# Patient Record
Sex: FEMALE | Race: WHITE | NOT HISPANIC OR LATINO | Employment: FULL TIME | URBAN - METROPOLITAN AREA
[De-identification: names, ages, dates, MRNs, and addresses within clinical notes are randomized per-mention and may not be internally consistent; named-entity substitution may affect disease eponyms.]

---

## 2017-05-04 ENCOUNTER — CONVERSION ENCOUNTER (OUTPATIENT)
Dept: ULTRASOUND IMAGING | Facility: HOSPITAL | Age: 52
End: 2017-05-04

## 2018-05-01 ENCOUNTER — APPOINTMENT (OUTPATIENT)
Dept: PREADMISSION TESTING | Facility: HOSPITAL | Age: 53
End: 2018-05-01

## 2018-05-01 VITALS
OXYGEN SATURATION: 98 % | DIASTOLIC BLOOD PRESSURE: 92 MMHG | RESPIRATION RATE: 20 BRPM | HEART RATE: 80 BPM | HEIGHT: 66 IN | SYSTOLIC BLOOD PRESSURE: 147 MMHG | WEIGHT: 150 LBS | BODY MASS INDEX: 24.11 KG/M2 | TEMPERATURE: 98.2 F

## 2018-05-01 LAB
ANION GAP SERPL CALCULATED.3IONS-SCNC: 14.9 MMOL/L
BASOPHILS # BLD AUTO: 0.04 10*3/MM3 (ref 0–0.2)
BASOPHILS NFR BLD AUTO: 0.5 % (ref 0–1.5)
BUN BLD-MCNC: 12 MG/DL (ref 6–20)
BUN/CREAT SERPL: 13.8 (ref 7–25)
CALCIUM SPEC-SCNC: 9.7 MG/DL (ref 8.6–10.5)
CHLORIDE SERPL-SCNC: 101 MMOL/L (ref 98–107)
CO2 SERPL-SCNC: 24.1 MMOL/L (ref 22–29)
CREAT BLD-MCNC: 0.87 MG/DL (ref 0.57–1)
DEPRECATED RDW RBC AUTO: 47.6 FL (ref 37–54)
EOSINOPHIL # BLD AUTO: 0.06 10*3/MM3 (ref 0–0.7)
EOSINOPHIL NFR BLD AUTO: 0.7 % (ref 0.3–6.2)
ERYTHROCYTE [DISTWIDTH] IN BLOOD BY AUTOMATED COUNT: 13.2 % (ref 11.7–13)
GFR SERPL CREATININE-BSD FRML MDRD: 69 ML/MIN/1.73
GLUCOSE BLD-MCNC: 90 MG/DL (ref 65–99)
HCG SERPL QL: NEGATIVE
HCT VFR BLD AUTO: 44 % (ref 35.6–45.5)
HGB BLD-MCNC: 14.1 G/DL (ref 11.9–15.5)
IMM GRANULOCYTES # BLD: 0 10*3/MM3 (ref 0–0.03)
IMM GRANULOCYTES NFR BLD: 0 % (ref 0–0.5)
LYMPHOCYTES # BLD AUTO: 3.78 10*3/MM3 (ref 0.9–4.8)
LYMPHOCYTES NFR BLD AUTO: 42.7 % (ref 19.6–45.3)
MCH RBC QN AUTO: 31.3 PG (ref 26.9–32)
MCHC RBC AUTO-ENTMCNC: 32 G/DL (ref 32.4–36.3)
MCV RBC AUTO: 97.8 FL (ref 80.5–98.2)
MONOCYTES # BLD AUTO: 0.56 10*3/MM3 (ref 0.2–1.2)
MONOCYTES NFR BLD AUTO: 6.3 % (ref 5–12)
NEUTROPHILS # BLD AUTO: 4.41 10*3/MM3 (ref 1.9–8.1)
NEUTROPHILS NFR BLD AUTO: 49.8 % (ref 42.7–76)
PLATELET # BLD AUTO: 296 10*3/MM3 (ref 140–500)
PMV BLD AUTO: 10.4 FL (ref 6–12)
POTASSIUM BLD-SCNC: 4.5 MMOL/L (ref 3.5–5.2)
RBC # BLD AUTO: 4.5 10*6/MM3 (ref 3.9–5.2)
SODIUM BLD-SCNC: 140 MMOL/L (ref 136–145)
WBC NRBC COR # BLD: 8.85 10*3/MM3 (ref 4.5–10.7)

## 2018-05-01 PROCEDURE — 36415 COLL VENOUS BLD VENIPUNCTURE: CPT

## 2018-05-01 PROCEDURE — 80048 BASIC METABOLIC PNL TOTAL CA: CPT | Performed by: OBSTETRICS & GYNECOLOGY

## 2018-05-01 PROCEDURE — 84703 CHORIONIC GONADOTROPIN ASSAY: CPT | Performed by: OBSTETRICS & GYNECOLOGY

## 2018-05-01 PROCEDURE — 85025 COMPLETE CBC W/AUTO DIFF WBC: CPT | Performed by: OBSTETRICS & GYNECOLOGY

## 2018-05-01 RX ORDER — PROMETHAZINE HYDROCHLORIDE 12.5 MG/1
12.5 TABLET ORAL EVERY 6 HOURS PRN
Status: ON HOLD | COMMUNITY
Start: 2016-04-19 | End: 2018-05-08

## 2018-05-01 NOTE — DISCHARGE INSTRUCTIONS
Take the following medications the morning of surgery with a small sip of water:    None    General Instructions:  • Do not eat solid food after midnight the night before surgery.  • You may drink clear liquids day of surgery but must stop at least one hour before your hospital arrival time.  • It is beneficial for you to have a clear drink that contains carbohydrates the day of surgery.  We suggest a 12 to 20 ounce bottle of Gatorade or Powerade for non-diabetic patients or a 12 to 20 ounce bottle of G2 or Powerade Zero for diabetic patients. (Pediatric patients, are not advised to drink a 12 to 20 ounce carbohydrate drink)    Clear liquids are liquids you can see through.  Nothing red in color.     Plain water                               Sports drinks  Sodas                                   Gelatin (Jell-O)  Fruit juices without pulp such as white grape juice and apple juice  Popsicles that contain no fruit or yogurt  Tea or coffee (no cream or milk added)  Gatorade / Powerade  G2 / Powerade Zero    • Infants may have breast milk up to four hours before surgery.  • Infants drinking formula may drink formula up to six hours before surgery.   • Patients who avoid smoking, chewing tobacco and alcohol for 4 weeks prior to surgery have a reduced risk of post-operative complications.  Quit smoking as many days before surgery as you can.  • Do not smoke, use chewing tobacco or drink alcohol the day of surgery.   • If applicable bring your C-PAP/ BI-PAP machine.  • Bring any papers given to you in the doctor’s office.  • Wear clean comfortable clothes and socks.  • Do not wear contact lenses or make-up.  Bring a case for your glasses.   • Bring crutches or walker if applicable.  • Remove all piercings.  Leave jewelry and any other valuables at home.  • Hair extensions with metal clips must be removed prior to surgery.  • The Pre-Admission Testing nurse will instruct you to bring medications if unable to obtain an  accurate list in Pre-Admission Testing.        If you were given a blood bank ID arm band remember to bring it with you the day of surgery.    Preventing a Surgical Site Infection:  • For 2 to 3 days before surgery, avoid shaving with a razor because the razor can irritate skin and make it easier to develop an infection.  • The night prior to surgery sleep in a clean bed with clean clothing.  Do not allow pets to sleep with you.  • Shower on the morning of surgery using a fresh bar of anti-bacterial soap (such as Dial) and clean washcloth.  Dry with a clean towel and dress in clean clothing.  • Ask your surgeon if you will be receiving antibiotics prior to surgery.  • Make sure you, your family, and all healthcare providers clean their hands with soap and water or an alcohol based hand  before caring for you or your wound.    Day of surgery:5/7/18  0730  Upon arrival, a Pre-op nurse and Anesthesiologist will review your health history, obtain vital signs, and answer questions you may have.  The only belongings needed at this time will be your home medications and if applicable your C-PAP/BI-PAP machine.  If you are staying overnight your family can leave the rest of your belongings in the car and bring them to your room later.  A Pre-op nurse will start an IV and you may receive medication in preparation for surgery, including something to help you relax.  Your family will be able to see you in the Pre-op area.  While you are in surgery your family should notify the waiting room  if they leave the waiting room area and provide a contact phone number.    Please be aware that surgery does come with discomfort.  We want to make every effort to control your discomfort so please discuss any uncontrolled symptoms with your nurse.   Your doctor will most likely have prescribed pain medications.      If you are going home after surgery you will receive individualized written care instructions before  being discharged.  A responsible adult must drive you to and from the hospital on the day of your surgery and stay with you for 24 hours.    If you are staying overnight following surgery, you will be transported to your hospital room following the recovery period.  [facility] has all private rooms.    If you have any questions please call Pre-Admission Testing at [phone].  Deductibles and co-payments are collected on the day of service. Please be prepared to pay the required co-pay, deductible or deposit on the day of service as defined by your plan.

## 2018-05-07 ENCOUNTER — ANESTHESIA (OUTPATIENT)
Dept: PERIOP | Facility: HOSPITAL | Age: 53
End: 2018-05-07

## 2018-05-07 ENCOUNTER — HOSPITAL ENCOUNTER (OUTPATIENT)
Facility: HOSPITAL | Age: 53
Discharge: HOME | End: 2018-05-08
Attending: OBSTETRICS & GYNECOLOGY | Admitting: OBSTETRICS & GYNECOLOGY

## 2018-05-07 ENCOUNTER — ANESTHESIA EVENT (OUTPATIENT)
Dept: PERIOP | Facility: HOSPITAL | Age: 53
End: 2018-05-07

## 2018-05-07 DIAGNOSIS — N83.201 RIGHT OVARIAN CYST: ICD-10-CM

## 2018-05-07 PROCEDURE — 25010000002 FENTANYL CITRATE (PF) 100 MCG/2ML SOLUTION: Performed by: NURSE ANESTHETIST, CERTIFIED REGISTERED

## 2018-05-07 PROCEDURE — 25010000002 ONDANSETRON PER 1 MG: Performed by: NURSE ANESTHETIST, CERTIFIED REGISTERED

## 2018-05-07 PROCEDURE — C1765 ADHESION BARRIER: HCPCS | Performed by: OBSTETRICS & GYNECOLOGY

## 2018-05-07 PROCEDURE — 25010000002 MIDAZOLAM PER 1 MG: Performed by: ANESTHESIOLOGY

## 2018-05-07 PROCEDURE — 25010000002 PROPOFOL 10 MG/ML EMULSION: Performed by: NURSE ANESTHETIST, CERTIFIED REGISTERED

## 2018-05-07 PROCEDURE — G0378 HOSPITAL OBSERVATION PER HR: HCPCS

## 2018-05-07 PROCEDURE — 25010000002 ONDANSETRON PER 1 MG: Performed by: OBSTETRICS & GYNECOLOGY

## 2018-05-07 PROCEDURE — 99243 OFF/OP CNSLTJ NEW/EST LOW 30: CPT | Performed by: SURGERY

## 2018-05-07 PROCEDURE — 25010000002 DEXAMETHASONE PER 1 MG: Performed by: NURSE ANESTHETIST, CERTIFIED REGISTERED

## 2018-05-07 PROCEDURE — 25010000002 ONDANSETRON PER 1 MG: Performed by: ANESTHESIOLOGY

## 2018-05-07 PROCEDURE — 25010000002 PROMETHAZINE PER 50 MG: Performed by: OBSTETRICS & GYNECOLOGY

## 2018-05-07 PROCEDURE — 25010000002 ROPIVACAINE PER 1 MG: Performed by: OBSTETRICS & GYNECOLOGY

## 2018-05-07 PROCEDURE — 88305 TISSUE EXAM BY PATHOLOGIST: CPT | Performed by: OBSTETRICS & GYNECOLOGY

## 2018-05-07 PROCEDURE — 88112 CYTOPATH CELL ENHANCE TECH: CPT | Performed by: OBSTETRICS & GYNECOLOGY

## 2018-05-07 RX ORDER — LIDOCAINE HYDROCHLORIDE 40 MG/ML
SOLUTION TOPICAL AS NEEDED
Status: DISCONTINUED | OUTPATIENT
Start: 2018-05-07 | End: 2018-05-07 | Stop reason: SURG

## 2018-05-07 RX ORDER — ONDANSETRON 2 MG/ML
4 INJECTION INTRAMUSCULAR; INTRAVENOUS EVERY 6 HOURS PRN
Status: DISCONTINUED | OUTPATIENT
Start: 2018-05-07 | End: 2018-05-08 | Stop reason: HOSPADM

## 2018-05-07 RX ORDER — LABETALOL HYDROCHLORIDE 5 MG/ML
5 INJECTION, SOLUTION INTRAVENOUS
Status: DISCONTINUED | OUTPATIENT
Start: 2018-05-07 | End: 2018-05-07 | Stop reason: HOSPADM

## 2018-05-07 RX ORDER — PROMETHAZINE HYDROCHLORIDE 25 MG/ML
12.5 INJECTION, SOLUTION INTRAMUSCULAR; INTRAVENOUS EVERY 6 HOURS PRN
Status: CANCELLED | OUTPATIENT
Start: 2018-05-07

## 2018-05-07 RX ORDER — DEXTROSE AND SODIUM CHLORIDE 5; .45 G/100ML; G/100ML
100 INJECTION, SOLUTION INTRAVENOUS CONTINUOUS
Status: DISCONTINUED | OUTPATIENT
Start: 2018-05-07 | End: 2018-05-08

## 2018-05-07 RX ORDER — EPHEDRINE SULFATE 50 MG/ML
5 INJECTION, SOLUTION INTRAVENOUS ONCE AS NEEDED
Status: DISCONTINUED | OUTPATIENT
Start: 2018-05-07 | End: 2018-05-07 | Stop reason: HOSPADM

## 2018-05-07 RX ORDER — LIDOCAINE HYDROCHLORIDE 20 MG/ML
INJECTION, SOLUTION INFILTRATION; PERINEURAL AS NEEDED
Status: DISCONTINUED | OUTPATIENT
Start: 2018-05-07 | End: 2018-05-07 | Stop reason: SURG

## 2018-05-07 RX ORDER — DOCUSATE SODIUM 100 MG/1
100 CAPSULE, LIQUID FILLED ORAL 2 TIMES DAILY
Status: DISCONTINUED | OUTPATIENT
Start: 2018-05-07 | End: 2018-05-08 | Stop reason: HOSPADM

## 2018-05-07 RX ORDER — GLYCOPYRROLATE 0.2 MG/ML
INJECTION INTRAMUSCULAR; INTRAVENOUS AS NEEDED
Status: DISCONTINUED | OUTPATIENT
Start: 2018-05-07 | End: 2018-05-07 | Stop reason: SURG

## 2018-05-07 RX ORDER — DEXAMETHASONE SODIUM PHOSPHATE 10 MG/ML
INJECTION INTRAMUSCULAR; INTRAVENOUS AS NEEDED
Status: DISCONTINUED | OUTPATIENT
Start: 2018-05-07 | End: 2018-05-07 | Stop reason: SURG

## 2018-05-07 RX ORDER — MIDAZOLAM HYDROCHLORIDE 1 MG/ML
1 INJECTION INTRAMUSCULAR; INTRAVENOUS
Status: DISCONTINUED | OUTPATIENT
Start: 2018-05-07 | End: 2018-05-07 | Stop reason: HOSPADM

## 2018-05-07 RX ORDER — MIDAZOLAM HYDROCHLORIDE 1 MG/ML
2 INJECTION INTRAMUSCULAR; INTRAVENOUS
Status: DISCONTINUED | OUTPATIENT
Start: 2018-05-07 | End: 2018-05-07 | Stop reason: HOSPADM

## 2018-05-07 RX ORDER — FAMOTIDINE 10 MG/ML
20 INJECTION, SOLUTION INTRAVENOUS ONCE
Status: COMPLETED | OUTPATIENT
Start: 2018-05-07 | End: 2018-05-07

## 2018-05-07 RX ORDER — MAGNESIUM HYDROXIDE 1200 MG/15ML
LIQUID ORAL AS NEEDED
Status: DISCONTINUED | OUTPATIENT
Start: 2018-05-07 | End: 2018-05-07 | Stop reason: HOSPADM

## 2018-05-07 RX ORDER — SODIUM CHLORIDE 0.9 % (FLUSH) 0.9 %
1-10 SYRINGE (ML) INJECTION AS NEEDED
Status: DISCONTINUED | OUTPATIENT
Start: 2018-05-07 | End: 2018-05-07 | Stop reason: HOSPADM

## 2018-05-07 RX ORDER — PROMETHAZINE HYDROCHLORIDE 25 MG/1
12.5 TABLET ORAL ONCE AS NEEDED
Status: DISCONTINUED | OUTPATIENT
Start: 2018-05-07 | End: 2018-05-07 | Stop reason: HOSPADM

## 2018-05-07 RX ORDER — SODIUM CHLORIDE, SODIUM LACTATE, POTASSIUM CHLORIDE, CALCIUM CHLORIDE 600; 310; 30; 20 MG/100ML; MG/100ML; MG/100ML; MG/100ML
9 INJECTION, SOLUTION INTRAVENOUS CONTINUOUS
Status: DISCONTINUED | OUTPATIENT
Start: 2018-05-07 | End: 2018-05-08

## 2018-05-07 RX ORDER — HYDROMORPHONE HYDROCHLORIDE 2 MG/1
2 TABLET ORAL
Status: DISCONTINUED | OUTPATIENT
Start: 2018-05-07 | End: 2018-05-08

## 2018-05-07 RX ORDER — HYDROMORPHONE HYDROCHLORIDE 2 MG/1
2 TABLET ORAL ONCE
Status: DISCONTINUED | OUTPATIENT
Start: 2018-05-07 | End: 2018-05-07 | Stop reason: HOSPADM

## 2018-05-07 RX ORDER — FLUMAZENIL 0.1 MG/ML
0.2 INJECTION INTRAVENOUS AS NEEDED
Status: DISCONTINUED | OUTPATIENT
Start: 2018-05-07 | End: 2018-05-07 | Stop reason: HOSPADM

## 2018-05-07 RX ORDER — FENTANYL CITRATE 50 UG/ML
50 INJECTION, SOLUTION INTRAMUSCULAR; INTRAVENOUS
Status: DISCONTINUED | OUTPATIENT
Start: 2018-05-07 | End: 2018-05-07 | Stop reason: HOSPADM

## 2018-05-07 RX ORDER — PROPOFOL 10 MG/ML
VIAL (ML) INTRAVENOUS AS NEEDED
Status: DISCONTINUED | OUTPATIENT
Start: 2018-05-07 | End: 2018-05-07 | Stop reason: SURG

## 2018-05-07 RX ORDER — HYDRALAZINE HYDROCHLORIDE 20 MG/ML
5 INJECTION INTRAMUSCULAR; INTRAVENOUS
Status: DISCONTINUED | OUTPATIENT
Start: 2018-05-07 | End: 2018-05-07 | Stop reason: HOSPADM

## 2018-05-07 RX ORDER — SODIUM CHLORIDE 9 MG/ML
INJECTION, SOLUTION INTRAVENOUS AS NEEDED
Status: DISCONTINUED | OUTPATIENT
Start: 2018-05-07 | End: 2018-05-07 | Stop reason: HOSPADM

## 2018-05-07 RX ORDER — ROPIVACAINE HYDROCHLORIDE 5 MG/ML
INJECTION, SOLUTION EPIDURAL; INFILTRATION; PERINEURAL AS NEEDED
Status: DISCONTINUED | OUTPATIENT
Start: 2018-05-07 | End: 2018-05-07 | Stop reason: HOSPADM

## 2018-05-07 RX ORDER — PROMETHAZINE HYDROCHLORIDE 25 MG/ML
12.5 INJECTION, SOLUTION INTRAMUSCULAR; INTRAVENOUS EVERY 4 HOURS PRN
Status: DISCONTINUED | OUTPATIENT
Start: 2018-05-07 | End: 2018-05-08 | Stop reason: HOSPADM

## 2018-05-07 RX ORDER — ROCURONIUM BROMIDE 10 MG/ML
INJECTION, SOLUTION INTRAVENOUS AS NEEDED
Status: DISCONTINUED | OUTPATIENT
Start: 2018-05-07 | End: 2018-05-07 | Stop reason: SURG

## 2018-05-07 RX ORDER — NALOXONE HCL 0.4 MG/ML
0.2 VIAL (ML) INJECTION AS NEEDED
Status: DISCONTINUED | OUTPATIENT
Start: 2018-05-07 | End: 2018-05-07 | Stop reason: HOSPADM

## 2018-05-07 RX ORDER — ONDANSETRON 2 MG/ML
4 INJECTION INTRAMUSCULAR; INTRAVENOUS ONCE AS NEEDED
Status: COMPLETED | OUTPATIENT
Start: 2018-05-07 | End: 2018-05-07

## 2018-05-07 RX ORDER — FENTANYL CITRATE 50 UG/ML
INJECTION, SOLUTION INTRAMUSCULAR; INTRAVENOUS AS NEEDED
Status: DISCONTINUED | OUTPATIENT
Start: 2018-05-07 | End: 2018-05-07 | Stop reason: SURG

## 2018-05-07 RX ORDER — SODIUM CHLORIDE 0.9 % (FLUSH) 0.9 %
1-10 SYRINGE (ML) INJECTION AS NEEDED
Status: DISCONTINUED | OUTPATIENT
Start: 2018-05-07 | End: 2018-05-08 | Stop reason: HOSPADM

## 2018-05-07 RX ORDER — DIPHENHYDRAMINE HYDROCHLORIDE 50 MG/ML
12.5 INJECTION INTRAMUSCULAR; INTRAVENOUS
Status: DISCONTINUED | OUTPATIENT
Start: 2018-05-07 | End: 2018-05-07 | Stop reason: HOSPADM

## 2018-05-07 RX ADMIN — ROCURONIUM BROMIDE 10 MG: 10 INJECTION INTRAVENOUS at 13:14

## 2018-05-07 RX ADMIN — FENTANYL CITRATE 25 MCG: 50 INJECTION INTRAMUSCULAR; INTRAVENOUS at 14:05

## 2018-05-07 RX ADMIN — MIDAZOLAM HYDROCHLORIDE 2 MG: 2 INJECTION, SOLUTION INTRAMUSCULAR; INTRAVENOUS at 10:42

## 2018-05-07 RX ADMIN — FENTANYL CITRATE 50 MCG: 50 INJECTION INTRAMUSCULAR; INTRAVENOUS at 14:48

## 2018-05-07 RX ADMIN — FENTANYL CITRATE 50 MCG: 50 INJECTION INTRAMUSCULAR; INTRAVENOUS at 14:58

## 2018-05-07 RX ADMIN — LIDOCAINE HYDROCHLORIDE 1 EACH: 40 SOLUTION TOPICAL at 12:22

## 2018-05-07 RX ADMIN — ONDANSETRON 4 MG: 2 INJECTION, SOLUTION INTRAMUSCULAR; INTRAVENOUS at 15:14

## 2018-05-07 RX ADMIN — PROMETHAZINE HYDROCHLORIDE 12.5 MG: 25 INJECTION INTRAMUSCULAR; INTRAVENOUS at 22:19

## 2018-05-07 RX ADMIN — ONDANSETRON 4 MG: 2 INJECTION INTRAMUSCULAR; INTRAVENOUS at 19:02

## 2018-05-07 RX ADMIN — ROCURONIUM BROMIDE 10 MG: 10 INJECTION INTRAVENOUS at 13:32

## 2018-05-07 RX ADMIN — FENTANYL CITRATE 25 MCG: 50 INJECTION INTRAMUSCULAR; INTRAVENOUS at 13:57

## 2018-05-07 RX ADMIN — FENTANYL CITRATE 100 MCG: 50 INJECTION INTRAMUSCULAR; INTRAVENOUS at 12:19

## 2018-05-07 RX ADMIN — ONDANSETRON 4 MG: 2 INJECTION INTRAMUSCULAR; INTRAVENOUS at 10:42

## 2018-05-07 RX ADMIN — DEXTROSE AND SODIUM CHLORIDE 100 ML/HR: 5; 450 INJECTION, SOLUTION INTRAVENOUS at 19:02

## 2018-05-07 RX ADMIN — ROCURONIUM BROMIDE 40 MG: 10 INJECTION INTRAVENOUS at 12:20

## 2018-05-07 RX ADMIN — SUGAMMADEX 300 MG: 100 INJECTION, SOLUTION INTRAVENOUS at 14:01

## 2018-05-07 RX ADMIN — FAMOTIDINE 20 MG: 10 INJECTION INTRAVENOUS at 10:42

## 2018-05-07 RX ADMIN — GLYCOPYRROLATE 0.2 MG: 0.2 INJECTION INTRAMUSCULAR; INTRAVENOUS at 12:28

## 2018-05-07 RX ADMIN — HYDROMORPHONE HYDROCHLORIDE 2 MG: 2 TABLET ORAL at 18:10

## 2018-05-07 RX ADMIN — DEXAMETHASONE SODIUM PHOSPHATE 8 MG: 10 INJECTION INTRAMUSCULAR; INTRAVENOUS at 12:26

## 2018-05-07 RX ADMIN — PROPOFOL 200 MG: 10 INJECTION, EMULSION INTRAVENOUS at 12:20

## 2018-05-07 RX ADMIN — SODIUM CHLORIDE, POTASSIUM CHLORIDE, SODIUM LACTATE AND CALCIUM CHLORIDE 9 ML/HR: 600; 310; 30; 20 INJECTION, SOLUTION INTRAVENOUS at 10:42

## 2018-05-07 RX ADMIN — SODIUM CHLORIDE, POTASSIUM CHLORIDE, SODIUM LACTATE AND CALCIUM CHLORIDE: 600; 310; 30; 20 INJECTION, SOLUTION INTRAVENOUS at 12:14

## 2018-05-07 RX ADMIN — SODIUM CHLORIDE, POTASSIUM CHLORIDE, SODIUM LACTATE AND CALCIUM CHLORIDE: 600; 310; 30; 20 INJECTION, SOLUTION INTRAVENOUS at 14:02

## 2018-05-07 RX ADMIN — FENTANYL CITRATE 50 MCG: 50 INJECTION INTRAMUSCULAR; INTRAVENOUS at 14:14

## 2018-05-07 RX ADMIN — LIDOCAINE HYDROCHLORIDE 50 MG: 20 INJECTION, SOLUTION INFILTRATION; PERINEURAL at 12:20

## 2018-05-07 NOTE — CONSULTS
Intraoperative consultation    The patient is a 52-year-old female who was undergoing a GYN laparoscopic procedure.  She was noted to have a rent in the mesentery adjacent to the sigmoid colon that was thought to be related to port placement.  Intraoperative consultation was requested by Bisi Donnelly M.D. to evaluate the sigmoid colon due to concern that there may be an injury.    The patient was already in surgery with a laparoscopic procedure underwent an unscrubbed and entered the case and was able to manipulate the rent in a way to get exposure to the sigmoid colon wall.  The wall appeared to be intact with no apparent evidence of injury.  The pelvis was filled with irrigation fluid and air was elevated through the rectum to insufflate the sigmoid colon with no air leak noted.  It was felt that the injury was isolated to the mesentery and was not involving the bowel.  The mesenteric rent itself was hemostatic.  No further treatment is necessary was advised that the patient be admitted postoperatively for close observation.    Prashant Hightower Jr., M.D.

## 2018-05-07 NOTE — PLAN OF CARE
Problem: Patient Care Overview  Goal: Plan of Care Review  Outcome: Ongoing (interventions implemented as appropriate)   05/07/18 1613   Coping/Psychosocial   Plan of Care Reviewed With patient   OTHER   Outcome Summary lap sites x 3 dry & [id number] with dermabond, scant vaginal drainage on narcisa-pad,, c/o more pain on right side, DTV, VSS, post-op nausea better after meds     Goal: Individualization and Mutuality  Outcome: Ongoing (interventions implemented as appropriate)    Goal: Discharge Needs Assessment  Outcome: Ongoing (interventions implemented as appropriate)    Goal: Interprofessional Rounds/Family Conf  Outcome: Ongoing (interventions implemented as appropriate)      Problem: Surgery Nonspecified (Adult)  Goal: Anesthesia/Sedation Recovery  Outcome: Outcome(s) achieved Date Met: 05/07/18

## 2018-05-07 NOTE — ANESTHESIA PREPROCEDURE EVALUATION
Anesthesia Evaluation     Patient summary reviewed and Nursing notes reviewed   history of anesthetic complications: PONV  NPO Solid Status: > 6 hours  NPO Liquid Status: > 6 hours           Airway   Mallampati: II  TM distance: >3 FB  Neck ROM: full  no difficulty expected and No difficulty expected  Dental - normal exam   (+) lower dentures and upper dentures    Pulmonary - negative pulmonary ROS and normal exam    breath sounds clear to auscultation  (-) rhonchi, decreased breath sounds, wheezes, rales, stridor  Cardiovascular - negative cardio ROS and normal exam    NYHA Classification: I  Rhythm: regular  Rate: normal    (-) murmur, weak pulses, friction rub, systolic click, carotid bruits, JVD, peripheral edema      Neuro/Psych- negative ROS  GI/Hepatic/Renal/Endo - negative ROS     Musculoskeletal (-) negative ROS    Abdominal  - normal exam    Abdomen: soft.   Substance History - negative use     OB/GYN negative ob/gyn ROS         Other - negative ROS                       Anesthesia Plan    ASA 2     general     intravenous induction   Anesthetic plan and risks discussed with patient.

## 2018-05-07 NOTE — ANESTHESIA POSTPROCEDURE EVALUATION
"Patient: aWleska Shah    Procedure Summary     Date:  05/07/18 Room / Location:  [facility] OR  / [facility] MAIN OR    Anesthesia Start:  1214 Anesthesia Stop:  1415    Procedure:  LAPAROSCOPIC RIGHT OVARIAN CYSTECTOMY LYSIS OF ADHESIONS LEFT ABDOMINAL WALL (Right Abdomen) Diagnosis:      Surgeon:  Bisi Donnelly MD Provider:  Mike Calderon MD    Anesthesia Type:  general ASA Status:  2          Anesthesia Type: general  Last vitals  BP   147/85 (05/07/18 1500)   Temp   36.6 °C (97.9 °F) (05/07/18 1411)   Pulse   84 (05/07/18 1500)   Resp   18 (05/07/18 1500)     SpO2   96 % (05/07/18 1500)     Post Anesthesia Care and Evaluation    Patient location during evaluation: PACU  Patient participation: complete - patient participated  Level of consciousness: awake and alert  Pain score: 0  Pain management: adequate  Airway patency: patent  Anesthetic complications: No anesthetic complications    Cardiovascular status: acceptable  Respiratory status: acceptable  Hydration status: acceptable    Comments: /85   Pulse 84   Temp 36.6 °C (97.9 °F) (Oral)   Resp 18   Ht 167.6 cm (66\")   Wt 68.7 kg (151 lb 8 oz)   LMP 04/08/2018   SpO2 96%   BMI 24.45 kg/m²       "

## 2018-05-07 NOTE — BRIEF OP NOTE
DIAGNOSTIC LAPAROSCOPY  Progress Note    Waleska Shah  5/7/2018    Pre-Op Diagnosis:  Three cysts in the right ovary       Post-Op Diagnosis Codes:   Three small blood filled cysts in the right ovary    Procedure/CPT® Codes:      Procedure(s):  LAPAROSCOPIC RIGHT OVARIAN CYSTECTOMY LYSIS OF ADHESIONS LEFT ABDOMINAL WALL with pelvic washings and consultation intra-op with Dr ROSA Hightower    Surgeon(s):  MD Bisi Galarza Jr., MD    Anesthesia: General    Staff:   Circulator: Perla Garcia RN  Scrub Person: Lesa Avila  Orientee: Alex Monsalve RN    Estimated Blood Los about 50 cc or less.    Urine Voided: * No values recorded between 5/7/2018 12:12 PM and 5/7/2018  2:10 PM *    Specimens:                ID Type Source Tests Collected by Time   A : PELVIC WASHINGS Body Fluid Pelvis NON-GYNECOLOGIC CYTOLOGY Bisi Donnelly MD 5/7/2018 1246   B : RIGHT OVARIAN CYST Tissue Ovary, Right TISSUE PATHOLOGY EXAM Bisi Donnelly MD 5/7/2018 1257         Drains:  none    Findings: Normal pelvis, Filmy adhesions between the mesentery and left pelvic sidewall.  Normal uterus.  Three small cysts, blood filled, on the right ovary.  Evidence of rent in the mesentery  adjacent to the sigmoid colon    Complications: Rent in the mesentery adjacent to the sigmoid colon , probably from trochar at entry.      Bisi Donnelly MD     Date: 5/7/2018  Time: 2:32 PM

## 2018-05-07 NOTE — ANESTHESIA PROCEDURE NOTES
Airway  Urgency: elective    Airway not difficult    General Information and Staff    Patient location during procedure: OR  Anesthesiologist: RAISSA MARTINEZ  CRNA: MICHAELLE MICHELLE    Indications and Patient Condition  Indications for airway management: airway protection    Preoxygenated: yes  MILS maintained throughout  Mask difficulty assessment: 2 - vent by mask + OA or adjuvant +/- NMBA    Final Airway Details  Final airway type: endotracheal airway      Successful airway: ETT  Cuffed: yes   Successful intubation technique: direct laryngoscopy  Facilitating devices/methods: intubating stylet  Endotracheal tube insertion site: oral  Blade: Amie  ETT size: 7.0 mm  Cormack-Lehane Classification: grade I - full view of glottis  Placement verified by: chest auscultation and capnometry   Inital cuff pressure (cm H2O): 21  Measured from: gums  Number of attempts at approach: 1    Additional Comments  Atraumatic. Dentition as preop.

## 2018-05-07 NOTE — OP NOTE
PREOPERATIVE DIAGNOSIS: Cyst with right lower quadrant pain 3 cysts on the right ovary    POSTOPERATIVE DIAGNOSIS: 3 small blood-filled cyst right ovary some filmy adhesions between the omentum and the left pelvic sidewall normal uterus    PROCEDURE: Laparoscopy with removal of 3 ovarian cysts and lysis of filmy adhesions.  One appeared consistent with a corpus luteal cyst the other 2 were blood-filled.  Pelvic washings .  Consultation from Dr. Prashant Hightower regarding rent in the mesenteric fat adjacent to the sigmoid.    SURGEON:  Bisi Donnelly MD    SURGICAL ASSISTANT: none    FINDINGS: Cyst on the right ovary 1 appeared to be a corpus luteal cyst the other tube were filled with blood.  Filmy adhesions to the left pelvic sidewall.  There was a rent in the midline mesenteric fat adjacent to the sigmoid that was noticed during the surgery.    COMPLICATIONS: Rent in the mesenteric sigmoid.    ANESTHESIA: Gen.    BLOOD LOSS: Less than 50 cc    DESCRIPTION OF PROCEDURE: The patient to the operating room.  She was given general anesthesia.  She was prepped and draped in a modified lithotomy position using Gian stirrups.  Attention was placed that her leg was in line with the contralateral shoulder.  After she was prepped her bladder was emptied.  She was then draped.  On bimanual the uterus uterus was midline and small.  Next a speculum was placed in vagina single-tooth tenaculum used to grasp the anterior lip of the cervix and a Hulka  manipulator inserted.  Ring that I changed my gloves and made a 5 mm incision at the superior portion of her local incision from her abdominoplasty.  First injected 5 cc of naropin 1%.  I had made a 5 mm incision after injecting the medication.  The abdominal wall was lifted which was somewhat difficult because of her previous abdominoplasty and I inserted a 5 mm trocar with the 5 mm laparoscope inside into the peritoneal cavity.  The trocar was removed along with the laparoscope and  she was insufflated from a pressure 3 to a pressure 15 mmHg.  He was then placed in Trendelenburg and the laparoscope was reinserted.  There were some filmy adhesions between the omentum and the pelvic sidewall.  The right ovary was smooth and had 3 small cysts.  There were absent fallopian tubes.  The uterus was normal.  It was elevated and the cul-de-sac was normal.  There was no evidence of endometriosis.  Pelvic washings were then taken.  I then opened each of the ovarian cyst and from to the cysts I was able to remove pieces of what appeared to be cyst lining.  These were then cauterized using a  Kleppinger to help control bleeding. Next,  the third area appeared to be a corpus luteal cyst. When opened,  blood came out of the 2 other cysts.  This was consistent with 2 hemorrhagic cysts.  Speck in the pelvis I noted that there was a tear in the mesenteric fat adjacent to the colon I asked that Dr. Prashant Hightower come in for consultation.  Please see his dictation.  I had applied FloSeal while waiting for Dr. Hightower.  Prior to that I had packed the cyst that was consistent with a corpus luteal cyst with the Snow fibrin a agent.  There was noted to be good hemostasis.  The ovary was wrapped in Interceed.  I had removed as much fluid as possible.  Her pelvis was filled with fluid to check for damage to the colon when Dr Hightower was in attendance.   Her all the fluid had been drained from the peritoneal cavity the ovary was wrapped in pieces of intercede.  I then allowed the cyst to escape and the peritoneal cavity and deflated the 3 balloons on the 5 mm trochars that I had filled after inserting them.  I removed the 3 trochars 4-0 Vicryl used to close the skin in a subcuticular fashion and Dermabond was applied.  Tenaculum was removed from the cervix.  Her vital signs were stable.  There is no evidence of bleeding when I finished the surgery.  She left the operating room in good condition.  Her estimated blood loss  was less than 50 cc.

## 2018-05-08 VITALS
TEMPERATURE: 97.5 F | WEIGHT: 151.5 LBS | OXYGEN SATURATION: 95 % | HEIGHT: 66 IN | RESPIRATION RATE: 16 BRPM | BODY MASS INDEX: 24.35 KG/M2 | HEART RATE: 77 BPM | DIASTOLIC BLOOD PRESSURE: 73 MMHG | SYSTOLIC BLOOD PRESSURE: 128 MMHG

## 2018-05-08 LAB
ALBUMIN SERPL-MCNC: 3.6 G/DL (ref 3.5–5.2)
ALBUMIN/GLOB SERPL: 1.4 G/DL
ALP SERPL-CCNC: 62 U/L (ref 39–117)
ALT SERPL W P-5'-P-CCNC: 6 U/L (ref 1–33)
ANION GAP SERPL CALCULATED.3IONS-SCNC: 11.2 MMOL/L
AST SERPL-CCNC: 10 U/L (ref 1–32)
BASOPHILS # BLD AUTO: 0 10*3/MM3 (ref 0–0.2)
BASOPHILS NFR BLD AUTO: 0 % (ref 0–1.5)
BILIRUB SERPL-MCNC: 0.4 MG/DL (ref 0.1–1.2)
BUN BLD-MCNC: 8 MG/DL (ref 6–20)
BUN/CREAT SERPL: 12.5 (ref 7–25)
CALCIUM SPEC-SCNC: 8.7 MG/DL (ref 8.6–10.5)
CHLORIDE SERPL-SCNC: 104 MMOL/L (ref 98–107)
CO2 SERPL-SCNC: 24.8 MMOL/L (ref 22–29)
CREAT BLD-MCNC: 0.64 MG/DL (ref 0.57–1)
CYTO UR: NORMAL
CYTO UR: NORMAL
DEPRECATED RDW RBC AUTO: 47.8 FL (ref 37–54)
EOSINOPHIL # BLD AUTO: 0 10*3/MM3 (ref 0–0.7)
EOSINOPHIL NFR BLD AUTO: 0 % (ref 0.3–6.2)
ERYTHROCYTE [DISTWIDTH] IN BLOOD BY AUTOMATED COUNT: 13.4 % (ref 11.7–13)
GFR SERPL CREATININE-BSD FRML MDRD: 98 ML/MIN/1.73
GLOBULIN UR ELPH-MCNC: 2.5 GM/DL
GLUCOSE BLD-MCNC: 114 MG/DL (ref 65–99)
HCT VFR BLD AUTO: 40 % (ref 35.6–45.5)
HGB BLD-MCNC: 12.7 G/DL (ref 11.9–15.5)
IMM GRANULOCYTES # BLD: 0.02 10*3/MM3 (ref 0–0.03)
IMM GRANULOCYTES NFR BLD: 0.2 % (ref 0–0.5)
LAB AP CASE REPORT: NORMAL
LAB AP CASE REPORT: NORMAL
LYMPHOCYTES # BLD AUTO: 1.64 10*3/MM3 (ref 0.9–4.8)
LYMPHOCYTES NFR BLD AUTO: 14.1 % (ref 19.6–45.3)
Lab: NORMAL
Lab: NORMAL
MCH RBC QN AUTO: 30.8 PG (ref 26.9–32)
MCHC RBC AUTO-ENTMCNC: 31.8 G/DL (ref 32.4–36.3)
MCV RBC AUTO: 97.1 FL (ref 80.5–98.2)
MONOCYTES # BLD AUTO: 0.77 10*3/MM3 (ref 0.2–1.2)
MONOCYTES NFR BLD AUTO: 6.6 % (ref 5–12)
NEUTROPHILS # BLD AUTO: 9.22 10*3/MM3 (ref 1.9–8.1)
NEUTROPHILS NFR BLD AUTO: 79.1 % (ref 42.7–76)
PATH REPORT.FINAL DX SPEC: NORMAL
PATH REPORT.FINAL DX SPEC: NORMAL
PATH REPORT.GROSS SPEC: NORMAL
PATH REPORT.GROSS SPEC: NORMAL
PLATELET # BLD AUTO: 268 10*3/MM3 (ref 140–500)
PMV BLD AUTO: 10.4 FL (ref 6–12)
POTASSIUM BLD-SCNC: 4.2 MMOL/L (ref 3.5–5.2)
PROT SERPL-MCNC: 6.1 G/DL (ref 6–8.5)
RBC # BLD AUTO: 4.12 10*6/MM3 (ref 3.9–5.2)
SODIUM BLD-SCNC: 140 MMOL/L (ref 136–145)
WBC NRBC COR # BLD: 11.65 10*3/MM3 (ref 4.5–10.7)

## 2018-05-08 PROCEDURE — 99212 OFFICE O/P EST SF 10 MIN: CPT | Performed by: SURGERY

## 2018-05-08 PROCEDURE — 85025 COMPLETE CBC W/AUTO DIFF WBC: CPT | Performed by: OBSTETRICS & GYNECOLOGY

## 2018-05-08 PROCEDURE — G0378 HOSPITAL OBSERVATION PER HR: HCPCS

## 2018-05-08 PROCEDURE — 80053 COMPREHEN METABOLIC PANEL: CPT | Performed by: OBSTETRICS & GYNECOLOGY

## 2018-05-08 RX ORDER — PROMETHAZINE HYDROCHLORIDE 12.5 MG/1
25 TABLET ORAL EVERY 6 HOURS PRN
Qty: 8 TABLET | Refills: 0 | Status: SHIPPED | OUTPATIENT
Start: 2018-05-08 | End: 2021-09-10

## 2018-05-08 RX ORDER — HYDROCODONE BITARTRATE AND ACETAMINOPHEN 7.5; 325 MG/1; MG/1
1 TABLET ORAL EVERY 6 HOURS PRN
Status: DISCONTINUED | OUTPATIENT
Start: 2018-05-08 | End: 2018-05-08 | Stop reason: HOSPADM

## 2018-05-08 RX ORDER — HYDROCODONE BITARTRATE AND ACETAMINOPHEN 7.5; 325 MG/1; MG/1
1 TABLET ORAL EVERY 6 HOURS PRN
Qty: 20 TABLET | Refills: 0 | Status: SHIPPED | OUTPATIENT
Start: 2018-05-08 | End: 2018-05-18

## 2018-05-08 RX ADMIN — DEXTROSE AND SODIUM CHLORIDE 100 ML/HR: 5; 450 INJECTION, SOLUTION INTRAVENOUS at 04:07

## 2018-05-08 RX ADMIN — HYDROMORPHONE HYDROCHLORIDE 2 MG: 2 TABLET ORAL at 05:23

## 2018-05-08 RX ADMIN — HYDROCODONE BITARTRATE AND ACETAMINOPHEN 1 TABLET: 7.5; 325 TABLET ORAL at 08:38

## 2018-05-08 RX ADMIN — DOCUSATE SODIUM 100 MG: 100 CAPSULE, LIQUID FILLED ORAL at 08:28

## 2018-05-08 NOTE — PLAN OF CARE
Problem: Patient Care Overview  Goal: Plan of Care Review   05/08/18 0356   Coping/Psychosocial   Plan of Care Reviewed With patient   OTHER   Outcome Summary Patient resting well. Vital signs stable. c/o intermittent nausea. Prn phenergan given.. Iv fluids continued. Encouraged to ambulate.     Goal: Individualization and Mutuality  Outcome: Ongoing (interventions implemented as appropriate)    Goal: Discharge Needs Assessment  Outcome: Ongoing (interventions implemented as appropriate)      Problem: Surgery Nonspecified (Adult)  Goal: Signs and Symptoms of Listed Potential Problems Will be Absent, Minimized or Managed (Surgery Nonspecified)  Outcome: Ongoing (interventions implemented as appropriate)

## 2018-05-08 NOTE — PROGRESS NOTES
Addendum to progress note    Slightly elevated wbc of 11,000 but no fever and clinically improved from yesterday.  Will discharge home.  To call for fever over 101 degrees, increasing pain or change in bowel habits.

## 2018-05-08 NOTE — PLAN OF CARE
Problem: Patient Care Overview  Goal: Plan of Care Review  Outcome: Outcome(s) achieved Date Met: 05/08/18 05/08/18 1026   Coping/Psychosocial   Plan of Care Reviewed With patient   Plan of Care Review   Progress improving     Goal: Individualization and Mutuality  Outcome: Outcome(s) achieved Date Met: 05/08/18    Goal: Interprofessional Rounds/Family Conf  Outcome: Outcome(s) achieved Date Met: 05/08/18      Problem: Surgery Nonspecified (Adult)  Goal: Signs and Symptoms of Listed Potential Problems Will be Absent, Minimized or Managed (Surgery Nonspecified)  Outcome: Outcome(s) achieved Date Met: 05/08/18

## 2018-05-08 NOTE — PROGRESS NOTES
S:  Nausea better. Hungry.  Minimal pain.  Still with distension    O:  VS stable. Afebrile    INcisions intact    Legs nontender    Abdomen with active bowel sounds with some distension    A: Stable with normal cbc and normal wbc    Nausea is improved with active bowel sounds and increased appetitie.     P: Discharge   To rto in two weeks.

## 2018-05-08 NOTE — PROGRESS NOTES
Case Management Discharge Note           Other:  (private vehicle)    Final Discharge Disposition Code: 01 - home or self-care

## 2018-05-08 NOTE — PROGRESS NOTES
Chief Complaint:    Follow-up mesenteric rent    Subjective:    The patient is feeling well.  She has a decreased appetite and nausea which she relates to her experience with anesthesia.  She has only expected postoperative abdominal pain.    Objective:    Temp:  [96.9 °F (36.1 °C)-98.3 °F (36.8 °C)] 97.5 °F (36.4 °C)  Heart Rate:  [58-91] 77  Resp:  [16-18] 16  BP: (115-147)/(61-85) 128/73    Physical Exam   Constitutional: She does not appear ill. No distress.   Abdominal: Soft. There is generalized tenderness (Appropriate postop tenderness).       Results:    White blood cell 11.65.    Assessment/Plan:    The patient is doing well postoperatively with no evidence of an underlying bowel injury.  I agree with discharge to home.    Prashant Hightower Jr., M.D.

## 2018-07-20 ENCOUNTER — OFFICE (OUTPATIENT)
Dept: URBAN - METROPOLITAN AREA CLINIC 66 | Facility: CLINIC | Age: 53
End: 2018-07-20

## 2018-07-20 VITALS
WEIGHT: 142 LBS | HEART RATE: 71 BPM | HEIGHT: 66 IN | SYSTOLIC BLOOD PRESSURE: 136 MMHG | DIASTOLIC BLOOD PRESSURE: 92 MMHG

## 2018-07-20 DIAGNOSIS — R10.11 RIGHT UPPER QUADRANT PAIN: ICD-10-CM

## 2018-07-20 DIAGNOSIS — K21.9 GASTRO-ESOPHAGEAL REFLUX DISEASE WITHOUT ESOPHAGITIS: ICD-10-CM

## 2018-07-20 DIAGNOSIS — M79.1 MYALGIA: ICD-10-CM

## 2018-07-20 DIAGNOSIS — K92.1 MELENA: ICD-10-CM

## 2018-07-20 PROCEDURE — 99244 OFF/OP CNSLTJ NEW/EST MOD 40: CPT

## 2018-07-25 ENCOUNTER — AMBULATORY SURGICAL CENTER (OUTPATIENT)
Dept: URBAN - METROPOLITAN AREA SURGERY 20 | Facility: SURGERY | Age: 53
End: 2018-07-25
Payer: OTHER GOVERNMENT

## 2018-07-25 ENCOUNTER — OFFICE (OUTPATIENT)
Dept: URBAN - METROPOLITAN AREA PATHOLOGY 4 | Facility: PATHOLOGY | Age: 53
End: 2018-07-25
Payer: OTHER GOVERNMENT

## 2018-07-25 VITALS — HEIGHT: 66 IN

## 2018-07-25 DIAGNOSIS — K29.80 DUODENITIS WITHOUT BLEEDING: ICD-10-CM

## 2018-07-25 DIAGNOSIS — K29.50 UNSPECIFIED CHRONIC GASTRITIS WITHOUT BLEEDING: ICD-10-CM

## 2018-07-25 DIAGNOSIS — K64.8 OTHER HEMORRHOIDS: ICD-10-CM

## 2018-07-25 DIAGNOSIS — K29.70 GASTRITIS, UNSPECIFIED, WITHOUT BLEEDING: ICD-10-CM

## 2018-07-25 DIAGNOSIS — K92.1 MELENA: ICD-10-CM

## 2018-07-25 DIAGNOSIS — R10.11 RIGHT UPPER QUADRANT PAIN: ICD-10-CM

## 2018-07-25 PROBLEM — K31.89 OTHER DISEASES OF STOMACH AND DUODENUM: Status: ACTIVE | Noted: 2018-07-25

## 2018-07-25 LAB
GI HISTOLOGY: A. SELECT: (no result)
GI HISTOLOGY: B. SELECT: (no result)
GI HISTOLOGY: C. SELECT: (no result)
GI HISTOLOGY: PDF REPORT: (no result)

## 2018-07-25 PROCEDURE — 45380 COLONOSCOPY AND BIOPSY: CPT

## 2018-07-25 PROCEDURE — 88305 TISSUE EXAM BY PATHOLOGIST: CPT

## 2018-07-25 PROCEDURE — 43239 EGD BIOPSY SINGLE/MULTIPLE: CPT

## 2018-07-25 RX ADMIN — PROPOFOL: 10 INJECTION, EMULSION INTRAVENOUS at 09:52

## 2018-07-25 NOTE — SERVICEHPINOTES
PETERSON BANKS  is a  52  female   who presents today for a  EGD-Colonoscopy   for   the indications listed below. The updated Patient Profile was reviewed prior to the procedure, in conjunction with the Physical Exam, including medical conditions, surgical procedures, medications, allergies, family history and social history. See Physical Exam time stamp below for date and time of HPI completion.Pre-operatively, I reviewed the indication(s) for the procedure, the risks of the procedure [including but not limited to: unexpected bleeding possibly requiring hospitalization and/or unplanned repeat procedures, perforation possibly requiring surgical treatment, missed lesions and complications of sedation/MAC (also explained by anesthesia staff)]. I have evaluated the patient for risks associated with the planned anesthesia and the procedure to be performed and find the patient an acceptable candidate for IV sedation.Multiple opportunities were provided for any questions or concerns, and all questions were answered satisfactorily before any anesthesia was administered. We will proceed with the planned procedure.BR

## 2018-09-12 ENCOUNTER — OFFICE (OUTPATIENT)
Dept: URBAN - METROPOLITAN AREA CLINIC 66 | Facility: CLINIC | Age: 53
End: 2018-09-12

## 2018-09-12 VITALS
HEIGHT: 66 IN | DIASTOLIC BLOOD PRESSURE: 84 MMHG | SYSTOLIC BLOOD PRESSURE: 137 MMHG | HEART RATE: 96 BPM | WEIGHT: 139 LBS

## 2018-09-12 DIAGNOSIS — K59.00 CONSTIPATION, UNSPECIFIED: ICD-10-CM

## 2018-09-12 DIAGNOSIS — R10.11 RIGHT UPPER QUADRANT PAIN: ICD-10-CM

## 2018-09-12 PROCEDURE — 99213 OFFICE O/P EST LOW 20 MIN: CPT

## 2018-10-22 PROBLEM — R10.11 ABDOMINAL PAIN - RUQ: Status: ACTIVE | Noted: 2018-07-25

## 2019-05-23 ENCOUNTER — APPOINTMENT (OUTPATIENT)
Dept: WOMENS IMAGING | Facility: HOSPITAL | Age: 54
End: 2019-05-23

## 2019-05-23 PROCEDURE — 77067 SCR MAMMO BI INCL CAD: CPT | Performed by: RADIOLOGY

## 2019-05-23 PROCEDURE — 77063 BREAST TOMOSYNTHESIS BI: CPT | Performed by: RADIOLOGY

## 2019-06-27 ENCOUNTER — APPOINTMENT (OUTPATIENT)
Dept: WOMENS IMAGING | Facility: HOSPITAL | Age: 54
End: 2019-06-27

## 2019-06-27 PROCEDURE — 76641 ULTRASOUND BREAST COMPLETE: CPT | Performed by: RADIOLOGY

## 2019-06-27 PROCEDURE — G0279 TOMOSYNTHESIS, MAMMO: HCPCS | Performed by: RADIOLOGY

## 2019-06-27 PROCEDURE — 77066 DX MAMMO INCL CAD BI: CPT | Performed by: RADIOLOGY

## 2019-06-27 PROCEDURE — 77062 BREAST TOMOSYNTHESIS BI: CPT | Performed by: RADIOLOGY

## 2020-04-14 ENCOUNTER — OFFICE VISIT CONVERTED (OUTPATIENT)
Dept: FAMILY MEDICINE CLINIC | Facility: CLINIC | Age: 55
End: 2020-04-14
Attending: NURSE PRACTITIONER

## 2020-05-08 ENCOUNTER — HOSPITAL ENCOUNTER (OUTPATIENT)
Dept: LAB | Facility: HOSPITAL | Age: 55
Discharge: HOME | End: 2020-05-08
Attending: NURSE PRACTITIONER

## 2020-05-08 LAB
25(OH)D3 SERPL-MCNC: 50.7 NG/ML (ref 30–100)
ALBUMIN SERPL-MCNC: 4.7 G/DL (ref 3.5–5)
ALBUMIN/GLOB SERPL: 1.5 {RATIO} (ref 1.4–2.6)
ALP SERPL-CCNC: 110 U/L (ref 53–141)
ALT SERPL-CCNC: 13 U/L (ref 10–40)
ANION GAP SERPL CALC-SCNC: 18 MMOL/L (ref 8–19)
AST SERPL-CCNC: 15 U/L (ref 15–50)
BASOPHILS # BLD AUTO: 0.06 10*3/UL (ref 0–0.2)
BASOPHILS NFR BLD AUTO: 0.7 % (ref 0–3)
BILIRUB SERPL-MCNC: 0.4 MG/DL (ref 0.2–1.3)
BUN SERPL-MCNC: 14 MG/DL (ref 5–25)
BUN/CREAT SERPL: 16 {RATIO} (ref 6–20)
CALCIUM SERPL-MCNC: 10.6 MG/DL (ref 8.7–10.4)
CHLORIDE SERPL-SCNC: 104 MMOL/L (ref 99–111)
CHOLEST SERPL-MCNC: 238 MG/DL (ref 107–200)
CHOLEST/HDLC SERPL: 4.8 {RATIO} (ref 3–6)
CONV ABS IMM GRAN: 0.02 10*3/UL (ref 0–0.2)
CONV CO2: 26 MMOL/L (ref 22–32)
CONV IMMATURE GRAN: 0.2 % (ref 0–1.8)
CONV TOTAL PROTEIN: 7.9 G/DL (ref 6.3–8.2)
CREAT UR-MCNC: 0.89 MG/DL (ref 0.5–0.9)
DEPRECATED RDW RBC AUTO: 46 FL (ref 36.4–46.3)
EOSINOPHIL # BLD AUTO: 0.08 10*3/UL (ref 0–0.7)
EOSINOPHIL # BLD AUTO: 0.9 % (ref 0–7)
ERYTHROCYTE [DISTWIDTH] IN BLOOD BY AUTOMATED COUNT: 13 % (ref 11.7–14.4)
FOLATE SERPL-MCNC: 12 NG/ML (ref 4.8–20)
GFR SERPLBLD BASED ON 1.73 SQ M-ARVRAT: >60 ML/MIN/{1.73_M2}
GLOBULIN UR ELPH-MCNC: 3.2 G/DL (ref 2–3.5)
GLUCOSE SERPL-MCNC: 89 MG/DL (ref 65–99)
HCT VFR BLD AUTO: 45.1 % (ref 37–47)
HDLC SERPL-MCNC: 50 MG/DL (ref 40–60)
HGB BLD-MCNC: 14.5 G/DL (ref 12–16)
IRON SATN MFR SERPL: 43 % (ref 20–55)
IRON SERPL-MCNC: 147 UG/DL (ref 60–170)
LDLC SERPL CALC-MCNC: 160 MG/DL (ref 70–100)
LYMPHOCYTES # BLD AUTO: 3.07 10*3/UL (ref 1–5)
LYMPHOCYTES NFR BLD AUTO: 35.4 % (ref 20–45)
MCH RBC QN AUTO: 30.9 PG (ref 27–31)
MCHC RBC AUTO-ENTMCNC: 32.2 G/DL (ref 33–37)
MCV RBC AUTO: 96.2 FL (ref 81–99)
MONOCYTES # BLD AUTO: 0.61 10*3/UL (ref 0.2–1.2)
MONOCYTES NFR BLD AUTO: 7 % (ref 3–10)
NEUTROPHILS # BLD AUTO: 4.83 10*3/UL (ref 2–8)
NEUTROPHILS NFR BLD AUTO: 55.8 % (ref 30–85)
NRBC CBCN: 0 % (ref 0–0.7)
OSMOLALITY SERPL CALC.SUM OF ELEC: 296 MOSM/KG (ref 273–304)
PLATELET # BLD AUTO: 322 10*3/UL (ref 130–400)
PMV BLD AUTO: 10.6 FL (ref 9.4–12.3)
POTASSIUM SERPL-SCNC: 4.5 MMOL/L (ref 3.5–5.3)
RBC # BLD AUTO: 4.69 10*6/UL (ref 4.2–5.4)
SODIUM SERPL-SCNC: 143 MMOL/L (ref 135–147)
T4 FREE SERPL-MCNC: 1.6 NG/DL (ref 0.9–1.8)
TIBC SERPL-MCNC: 342 UG/DL (ref 245–450)
TRANSFERRIN SERPL-MCNC: 239 MG/DL (ref 250–380)
TRIGL SERPL-MCNC: 139 MG/DL (ref 40–150)
TSH SERPL-ACNC: 0.83 M[IU]/L (ref 0.27–4.2)
VIT B12 SERPL-MCNC: 520 PG/ML (ref 211–911)
VLDLC SERPL-MCNC: 28 MG/DL (ref 5–37)
WBC # BLD AUTO: 8.67 10*3/UL (ref 4.8–10.8)

## 2020-07-07 ENCOUNTER — HOSPITAL ENCOUNTER (OUTPATIENT)
Dept: GENERAL RADIOLOGY | Facility: HOSPITAL | Age: 55
Discharge: HOME | End: 2020-07-07
Attending: NURSE PRACTITIONER

## 2020-08-05 ENCOUNTER — OFFICE VISIT CONVERTED (OUTPATIENT)
Dept: FAMILY MEDICINE CLINIC | Facility: CLINIC | Age: 55
End: 2020-08-05
Attending: INTERNAL MEDICINE

## 2021-05-12 NOTE — PROGRESS NOTES
Progress Note      Patient Name: Waleska Shah   Patient ID: [id number]   Sex: Female   YOB: 1965    Primary Care Provider: Amarilys PYLE   Referring Provider: Nancy WU    Visit Date: April 14, 2020    Provider: LASHANDA Mederos   Location: [facility]   Location Address: [address] [place] [address], [address]  [place], [place]  [id number]   Location Phone: [phone]          Chief Complaint  · New Patient     Patient is here to established, previous Dr. Nancy Han patient of 20yrs.    Patient has a h/o B12 deficiency states she used to get B12 injection twice weekly.  Patient has not had labs in about year.    C/o back pain states she takes Norco 5-325mg prn.    Current smoker, smokes a pack a day since the age of 18.           History Of Present Illness  Waleska Shah is a 54 year old /White female who presents for evaluation and treatment of:      the patient presents today in the office face to face she is here as a new patient and her records that she has with her are incomplete we will send for more thorough records from what I can gather thus far she has h/o OA right knee she has seen ortho(Dr Serrato) she has moderate canal stenosis in her lumbar spine L4-5 for these reasons she takes pain medication intermittently and sparingly.....she sees gyn (Dr Andrzej Fuentes) in [place] so her gyn and mammograms are there.  SHe has never had a bone density.... and she has also seen ortho in [place] and she cannot rememeber the name and she thinks he was with [facility] and she thinks her labs were done through [facility] or [facility] or both and she is on hydrocodone but she only likes name brand due to blurred vision she uses Norco do not substitute she needs PA with .  She has not had labs done in a year and she has h/o b12 def she takes 5000mcg SL, vit D def.  She has h/o nicotine dependence smoked since the age of 18. She is not old enough for screening  low dose CT chest we will obtain at age 56.  She sees Nicolette Elizabeth for anxiety.       Past Medical History  Disease Name Date Onset Notes   Anxiety --  --    Depression --  --          Past Surgical History  Procedure Name Date Notes   Abdominoplasty --  --    Appendectomy --  --    Colonoscopy 2013 --    EGD 2013 --    Knee surgery --  --    Tubal ligation --  --          Medication List  Name Date Started Instructions   Norco 5-325 mg oral tablet  take 1 tablet by oral route every 6 hours as needed for pain         Allergy List  Allergen Name Date Reaction Notes   NO KNOWN DRUG ALLERGIES --  --  --          Family Medical History  Disease Name Relative/Age Notes   Colon Neoplasm, Malignant Grandfather (maternal)/50s   --          Social History  Finding Status Start/Stop Quantity Notes   Alcohol Former --/-- --  --    Tobacco Current every day 17/-- 1ppd --          Review of Systems  · Constitutional  o Denies  o : fever, weight gain, weight loss, malaise/fatigue  · Eyes  o Denies  o : diplopia, recent changes, blurred vision,   · HENT  o Denies  o : sore throat, hearing changes, tinnitus, nose bleeding, sinus pain, nasal discharge, ear pain  · Breasts  o Denies  o : lumps, tenderness, swelling, nipple discharge  · Cardiovascular  o Denies  o : palpitation, chest pain, claudication, pedal edema  · Respiratory  o Admits  o : nicotine dependence  o Denies  o : shortness of breath, REYNOLDS, cough  · Gastrointestinal  o Denies  o : nausea, vomiting, reflux, diarrhea, constipation, abdominal pain, blood in stools  · Genitourinary  o Admits  o : h/o ovary removal times 1 and bonnie fallopian tube removal  o Denies  o : frequency, urgency, dysuria, incontinence, nocturia, irregular menses, hot flashes  · Neurologic  o Admits  o : lumbar spinal stenosis  o Denies  o : unsteady gait, weakness, dizziness, H/A  · Musculoskeletal  o Admits  o : chronic lumbago, chronic right knee pain OA  o Denies  o : joint  "swelling  · Endocrine  o Denies  o : heat intolerance, cold intolerance, polyuria, polydipsia  · Psychiatric  o Admits  o : h/o anx  o Denies  o : suicidal ideation, homicidal ideation, mood changes, hallucinations, memory  · Heme-Lymph  o Admits  o : b12, D def  o Denies  o : easy bleeding, easy bruising, edema, lymph node enlargement or tenderness  · Allergic-Immunologic  o Denies  o : bees, frequent illnesses, seasonal allergies      Vitals  Date Time BP Position Site L\R Cuff Size HR RR TEMP (F) WT  HT  BMI kg/m2 BSA m2 O2 Sat HC       04/14/2020 11:30 /92 Sitting    70 - R   140lbs 8oz 5'  6\" 22.68 1.72           Physical Examination  · Constitutional  o Appearance  o : well-nourished, well developed, alert, in no acute distress  · Neck  o Thyroid  o : gland size normal, nontender, no nodules or masses present on palpation, thyroid motion normal during swallowing  · Respiratory  o Respiratory Effort  o : breathing unlabored  o Auscultation of Lungs  o : normal breath sounds throughout  · Cardiovascular  o Heart  o :   § Auscultation of Heart  § : regular rate and rhythm, no murmurs, gallops or rubs  § Palpation of Heart  § : normal apical impulse, no cardiac thrill present  o Peripheral Vascular System  o :   § Carotid Arteries  § : normal pulses bilaterally, no bruits present  § Extremities  § : no cyanosis, clubbing or edema; less than 2 second refill noted  · Gastrointestinal  o Abdominal Examination  o : abdomen nontender to palpation, tone normal without rigidity or guarding, no masses present, abdominal contour scaphoid  o Liver and spleen  o : no hepatomegaly present, liver nontender to palpation, spleen not palpable  · Skin and Subcutaneous Tissue  o General Inspection  o : no rashes or lesions present, no areas of discoloration  · Neurologic  o Mental Status Examination  o :   § Orientation  § : grossly oriented to person, place and time  o Cranial Nerves  o : cranial nerves intact and symmetric " throughout  · Psychiatric  o Mood and Affect  o : mood normal, affect appropriate, denies any SI/HI          Results  · In-Office Procedures  o Lab procedure  § IOP - Urine Drug Screen In-House [facility] (98753)   § Amphetamines Ur Ql: Negative   § Barbiturates Ur Ql: Negative   § Buprenorphine+Nor Ur Ql Scn: Negative   § Benzodiaz Ur Ql: Negative   § Cocaine Ur Ql: Negative   § Methadone Ur Ql: Negative   § Methamphet Ur Ql: Negative   § MDMA Ur Ql Scn: Negative   § Opiates Ur Ql: Negative   § Oxycodone Ur Ql: Negative   § PCP Ur Ql: Negative   § THC Ur Ql: Positive   § Temp in Range?: Within/Acceptable       Assessment  · Anxiety disorder     300.00/F41.9  · Lumbago     724.2/M54.5  · Nicotine dependence     305.1/F17.200  · Osteoarthritis     715.90/M19.90  · Other long term (current) drug therapy     V58.69/Z79.899  · Vitamin D deficiency     268.9/E55.9  · Screening for depression     V79.0/Z13.89  · Screening for lipid disorders     V77.91/Z13.220  · Establishing care with new doctor, encounter for     V65.8/Z76.89  · Routine lab draw     V72.60/Z01.89  · B12 deficiency     266.2/E53.8  · Spinal stenosis of lumbar region, unspecified whether neurogenic claudication present     724.02/M48.061      Plan  · Orders  o ACO-17: Screened for tobacco use AND received tobacco cessation intervention (4004F) - 305.1/F17.200 - 04/14/2020  o CBC with Auto Diff [facility] (67838) - V58.69/Z79.899 - 04/14/2020  o CMP [facility] (90682) - V58.69/Z79.899 - 04/14/2020  o Lipid Panel [facility] (59308) - V58.69/Z79.899, V77.91/Z13.220 - 04/14/2020  o Thyroid Profile (46925, 57338, THYII) - V72.60/Z01.89 - 04/14/2020  o Vitamin D (25-Hydroxy) Level (34546) - 268.9/E55.9 - 04/14/2020  o MARLENA Report (KASPR) - - 04/14/2020  o ACO-39: Current medications updated and reviewed () - - 04/14/2020  o ACO-18: Negative screen for clinical depression using a standardized tool () - - 04/14/2020  o ACO-14: Influenza immunization was not administered for  reasons documented () - - 04/14/2020  o B12 Folate levels (B12FO) - 266.2/E53.8, V58.69/Z79.899 - 04/14/2020  o Iron Profile (Iron, TIBC, and Transferrin) (IRONP) - V72.60/Z01.89 - 04/14/2020  o DEXA Bone Density, 1 or more sites, axial skeleton [facility] (71863) - - 07/07/2020   Scheduled 7/7/20 @ 11am NICK  · Medications  o Medications have been Reconciled  o Transition of Care or Provider Policy  · Instructions  o *Form of nicotine being used:   o Patient was strongly encouraged to discontinue use of any nicotine containing product or minimize the use of the product.  o Depression Screen completed and scanned into the EMR under the designated folder within the patient's documents.  o Today's PHQ-9 result is _2__  o Handouts were given to patient:   o Patient is taking medications as prescribed and doing well.   o Take all medications as prescribed/directed.  o Patient was educated/instructed on their diagnosis, treatment and medications prior to discharge from the clinic today.  o Patient counseled to stop smoking.  o Risks, benefits, and alternatives were discussed with the patient. The patient is aware of risks associated with: medications  o Chronic conditions reviewed and taken into consideration for today's treatment plan.  · Disposition  o Call or Return if symptoms worsen or persist.  o follow up 3 months  o follow up prn or as needed  o Care Transition            Electronically Signed by: LASHANDA Mederos -Author on April 18, 2020 11:05:28 AM

## 2021-05-13 NOTE — PROGRESS NOTES
Progress Note      Patient Name: Waleska Shah   Patient ID: [id number]   Sex: Female   YOB: 1965    Primary Care Provider: Amarilys PYLE   Referring Provider: Nancy WU    Visit Date: August 5, 2020    Provider: Shahid Nava DO   Location: [facility]   Location Address: [address] [place] [address], [address]  [place], [place]  [id number]   Location Phone: [phone]          Chief Complaint  · Follow up - Anxiety, Depression, Back pain      History Of Present Illness  Waleska Shah is a 54 year old /White female who presents for evaluation and treatment of:      Routine follow up for medication refills.    Anxiety:  Takes Diazepam with good control of symptoms.    Low back pain:  Takes Tramadol, Norco, Opana, Embeda with good control of symptoms.  Patient has low back pain not associated with injury or fall.  Patient states she has not had recent imaging but her 'back has hurt for years'.  Patient states she received an Rx for Norco (name brand only) which she dropped off at [facility] in [place].  Patient reports she was not able to fill Rx because it needed a refill.    Patient requesting Ophthalmology referral to Dr. Royer Coyle, [place] office.  Patient has seen him before for eye lids that 'hang too far over her eyes'.    Patient reports she was told her Calcium levels were too low and she needs to repeat lab and begin taking oral supplement.       Past Medical History  Disease Name Date Onset Notes   Anxiety --  --    Depression --  --          Past Surgical History  Procedure Name Date Notes   Abdominoplasty --  --    Appendectomy --  --    Colonoscopy 2013 --    EGD 2013 --    Knee surgery --  --    Tubal ligation --  --          Medication List  Name Date Started Instructions   diazepam 5 mg oral tablet  take 1 tablet by oral route As needed   Embeda 20 -0mg Oral  Take 1 by oral route as needed for pain   Norco 5-325 mg oral tablet  "08/13/2020 take 1 tablet by oral route every 8 hours as needed for severe pain (8-10)   Opana 10 mg oral tablet  take 1 tablet by oral route every 6 hours as needed   tramadol 50 mg oral tablet  take 1 tablet (50 mg) by oral route every 6 hours as needed         Allergy List  Allergen Name Date Reaction Notes   NO KNOWN DRUG ALLERGIES --  --  --          Family Medical History  Disease Name Relative/Age Notes   Colon Neoplasm, Malignant Grandfather (maternal)/50s   --          Social History  Finding Status Start/Stop Quantity Notes   Alcohol Former --/-- --  --    Tobacco Current every day 17/-- 1ppd --          Immunizations  NameDate Admin Mfg Trade Name Lot Number Route Inj VIS Given VIS Publication   InfluenzaRefused 08/05/2020 NE Not Entered  NE NE     Comments:          Review of Systems  · Constitutional  o Denies  o : fatigue  · Eyes  o Denies  o : blurred vision, changes in vision  · HENT  o Denies  o : headaches  · Cardiovascular  o Denies  o : chest pain, irregular heart beats, rapid heart rate, dyspnea on exertion  · Respiratory  o Denies  o : shortness of breath, wheezing, cough  · Gastrointestinal  o Denies  o : nausea, vomiting, diarrhea, constipation, abdominal pain, blood in stools, melena  · Genitourinary  o Denies  o : frequency, dysuria, hematuria  · Integument  o Denies  o : rash, new skin lesions  · Musculoskeletal  o Admits  o : joint pain, limitation of motion, back pain  o Denies  o : joint swelling, muscle pain  · Endocrine  o Denies  o : polyuria, polydipsia      Vitals  Date Time BP Position Site L\R Cuff Size HR RR TEMP (F) WT  HT  BMI kg/m2 BSA m2 O2 Sat        07/31/2015 02:32 PM      77 - R   142lbs 0oz 5'  6\" 22.92 1.73 98 %    04/14/2020 11:30 /92 Sitting    70 - R   140lbs 8oz 5'  6\" 22.68 1.72     08/05/2020 03:57 /87 Sitting    78 - R  97.9 143lbs 6oz 5'  6\" 23.14 1.74 100 %          Physical Examination  · Constitutional  o Appearance  o : alert, oriented, in no " acute distress, well developed, well-nourished  · Eyes  o Vision  o : Conjuntivae: Normal, Sclerae white, Pupils: PERRL, Cornea: Clear, no lesions bilateral  · Ears, Nose, Mouth and Throat  o Ears  o : Ext. Ears: Normal shape, Non tender, EACs: Normal , Tragus intact bilaterally, Hearing: intact to conversational voice bilaterally  o Nose  o : No nasal discharge, Mucosa: normal, Septum: midline, Sinuses: Nontender  o Throat  o : Oropharynx: no inflammation or lesions, no purulence or drainage  · Neck  o Inspection/Palpation  o : Supple, no masses or tenderness, no deformities, Trachea: Midline, ROM: with in normal limits, no neck stiffness, no lymphadenopathy  o Thyroid  o : no thyromegaly, no palpabale masses  · Respiratory  o Auscultation of Lungs  o : normal breath sounds throughout, no wheeze, rhonchi, or crackles  · Cardiovascular  o Heart  o : Regular rate and rhythm, Normal S1,S2 , No cardiac murmers, No S3 or S4 gallop or rubs  · Gastrointestinal  o Abdominal Examination  o : abdomen soft, nontender, non distended, no rigidity, guarding, rebound tenderness, no ventral hernias present  o Liver and spleen  o : no hepatomegaly present, liver nontender to palpation, spleen not palpable  · Musculoskeletal  o General  o :   § General Musculoskeletal  § : No joint swelling or deformity., Muscle tone, strength, and development grossly normal.  · Skin and Subcutaneous Tissue  o General Inspection  o : no rashes on visible skin, normal skin color, warm and dry  o Digits and Nails  o : no clubbing, cyanosis, deformities or edema present, normal appearing nails  · Neurologic  o Mental Status Examination  o : alert and oriented to time, place, and person. Gait and Station: normal gait, able to stand without difficulty. CN 2-12 grossly intact   · Psychiatric  o Judgement and Insight  o : judgment and insight intact  o Mood and Affect  o : normal mood and  "affect              Assessment  · Depression     311/F32.9  · Lumbago     724.2/M54.5  · Polyarthralgia     719.49/M25.50  · Anxiety     300.00/F41.9  · Hypercalcemia     275.42/E83.52  Do hypercalcemia work up.  · Drooping eyelid     374.30/H02.409  · Opioid dependence     304.00/F11.20  · Chronic low back pain       Low back pain     724.2/M54.5  Other chronic pain     724.2/G89.29  MARLENA to be completed. Patient UDS up to date. Patient no recent imaging on back, would recommend this going forward. Will refill for 30 days on patient Norco, would no refill Tramadol as well both are immediate release medications and inappropriate together. May need to go see Pain Management.      Plan  · Orders  o ACO-39: Current medications updated and reviewed () - - 08/05/2020  o ACO-14: Influenza immunization was not administered for reasons documented () - - 08/05/2020   Patient refused  o Calcium (Total) (89053) - 275.42/E83.52 - 08/05/2020  o Calcium (Ionized) (78221) - 275.42/E83.52 - 08/05/2020  o PTH (85513) - 275.42/E83.52 - 08/05/2020  o OPHTHALMOLOGY CONSULTATION (OPHTH) - 374.30/H02.409 - 08/14/2020  · Medications  o Embeda 20 -0mg Oral   SIG: Take 1 by oral route as needed for pain   DISP: # 0 with 0 refills  Discontinued on 08/14/2020     o Opana 10 mg oral tablet   SIG: take 1 tablet by oral route every 6 hours as needed   DISP: (0) tablet with 0 refills  Discontinued on 08/14/2020     o tramadol 50 mg oral tablet   SIG: take 1 tablet (50 mg) by oral route every 6 hours as needed   DISP: (0) tablet with 0 refills  Discontinued on 08/14/2020     o Medications have been Reconciled  o Transition of Care or Provider Policy  · Instructions  o Patient agrees to a \"No Self Harm\" contract. Patient will either call us, 911, ER, [facility], [facility].  o (NSAID) Non-steroidal Anti-inflammatory medication was recommended/prescribed. Ensure you take this medication with food as " directed. Do not use Motrin/ibuprofen/Advil while using the anti-inflammatory medication prescribed.  o Patient was educated/instructed on their diagnosis, treatment and medications prior to discharge from the clinic today.  o Patient was instructed to exercise regularly.  o Patient instructed to seek medical attention urgently for new or worsening symptoms.  o Call the office with any concerns or questions.  o Minutes spent with patient including greater than 50% in Education/Counseling/Care Coordination.  o Time spent with the patient was minutes, more than 50% face to face.  o Chronic conditions reviewed and taken into consideration for today's treatment plan.  o Discussed Covid-19 precautions including, but not limited to, social distancing, avoid touching your face, and hand washing.   · Disposition  o Follow up in three months            Electronically Signed by: Shahid Nava DO -Author on August 14, 2020 01:15:54 AM

## 2021-05-15 VITALS
BODY MASS INDEX: 22.58 KG/M2 | WEIGHT: 140.5 LBS | DIASTOLIC BLOOD PRESSURE: 92 MMHG | HEART RATE: 70 BPM | SYSTOLIC BLOOD PRESSURE: 141 MMHG | HEIGHT: 66 IN

## 2021-05-15 VITALS
DIASTOLIC BLOOD PRESSURE: 87 MMHG | SYSTOLIC BLOOD PRESSURE: 152 MMHG | TEMPERATURE: 97.9 F | BODY MASS INDEX: 23.04 KG/M2 | HEART RATE: 78 BPM | OXYGEN SATURATION: 100 % | WEIGHT: 143.37 LBS | HEIGHT: 66 IN

## 2021-09-10 ENCOUNTER — OFFICE VISIT (OUTPATIENT)
Dept: FAMILY MEDICINE CLINIC | Facility: CLINIC | Age: 56
End: 2021-09-10

## 2021-09-10 VITALS
DIASTOLIC BLOOD PRESSURE: 88 MMHG | BODY MASS INDEX: 22.6 KG/M2 | HEIGHT: 66 IN | SYSTOLIC BLOOD PRESSURE: 134 MMHG | HEART RATE: 72 BPM | OXYGEN SATURATION: 98 % | TEMPERATURE: 97.7 F | WEIGHT: 140.6 LBS

## 2021-09-10 DIAGNOSIS — R10.10 UPPER ABDOMINAL PAIN: ICD-10-CM

## 2021-09-10 DIAGNOSIS — N32.81 OVERACTIVE BLADDER: ICD-10-CM

## 2021-09-10 DIAGNOSIS — N39.9 URINARY DISORDER: ICD-10-CM

## 2021-09-10 DIAGNOSIS — F17.200 TOBACCO DEPENDENCY: ICD-10-CM

## 2021-09-10 DIAGNOSIS — R14.0 ABDOMINAL BLOATING: ICD-10-CM

## 2021-09-10 DIAGNOSIS — Z00.00 ANNUAL PHYSICAL EXAM: Primary | ICD-10-CM

## 2021-09-10 DIAGNOSIS — R53.82 CHRONIC FATIGUE: ICD-10-CM

## 2021-09-10 DIAGNOSIS — N39.3 FEMALE STRESS INCONTINENCE: ICD-10-CM

## 2021-09-10 LAB
25(OH)D3 SERPL-MCNC: 54.2 NG/ML
ALBUMIN SERPL-MCNC: 5.2 G/DL (ref 3.5–5.2)
ALBUMIN/GLOB SERPL: 1.9 G/DL
ALP SERPL-CCNC: 90 U/L (ref 39–117)
ALT SERPL W P-5'-P-CCNC: 11 U/L (ref 1–33)
ANION GAP SERPL CALCULATED.3IONS-SCNC: 14.8 MMOL/L (ref 5–15)
AST SERPL-CCNC: 21 U/L (ref 1–32)
BASOPHILS # BLD AUTO: 0.05 10*3/MM3 (ref 0–0.2)
BASOPHILS NFR BLD AUTO: 0.7 % (ref 0–1.5)
BILIRUB SERPL-MCNC: 0.3 MG/DL (ref 0–1.2)
BUN SERPL-MCNC: 11 MG/DL (ref 6–20)
BUN/CREAT SERPL: 12.5 (ref 7–25)
CALCIUM SPEC-SCNC: 10.6 MG/DL (ref 8.6–10.5)
CHLORIDE SERPL-SCNC: 101 MMOL/L (ref 98–107)
CHOLEST SERPL-MCNC: 268 MG/DL (ref 0–200)
CO2 SERPL-SCNC: 24.2 MMOL/L (ref 22–29)
CREAT SERPL-MCNC: 0.88 MG/DL (ref 0.57–1)
DEPRECATED RDW RBC AUTO: 44.4 FL (ref 37–54)
EOSINOPHIL # BLD AUTO: 0.04 10*3/MM3 (ref 0–0.4)
EOSINOPHIL NFR BLD AUTO: 0.6 % (ref 0.3–6.2)
ERYTHROCYTE [DISTWIDTH] IN BLOOD BY AUTOMATED COUNT: 12.8 % (ref 12.3–15.4)
FERRITIN SERPL-MCNC: 130 NG/ML (ref 13–150)
FOLATE SERPL-MCNC: 8.79 NG/ML (ref 4.78–24.2)
GFR SERPL CREATININE-BSD FRML MDRD: 67 ML/MIN/1.73
GLOBULIN UR ELPH-MCNC: 2.8 GM/DL
GLUCOSE SERPL-MCNC: 84 MG/DL (ref 65–99)
HBA1C MFR BLD: 5.2 % (ref 4.8–5.6)
HCT VFR BLD AUTO: 42.3 % (ref 34–46.6)
HDLC SERPL-MCNC: 54 MG/DL (ref 40–60)
HGB BLD-MCNC: 14.1 G/DL (ref 12–15.9)
IMM GRANULOCYTES # BLD AUTO: 0.01 10*3/MM3 (ref 0–0.05)
IMM GRANULOCYTES NFR BLD AUTO: 0.1 % (ref 0–0.5)
LDLC SERPL CALC-MCNC: 186 MG/DL (ref 0–100)
LDLC/HDLC SERPL: 3.39 {RATIO}
LYMPHOCYTES # BLD AUTO: 3.1 10*3/MM3 (ref 0.7–3.1)
LYMPHOCYTES NFR BLD AUTO: 44.9 % (ref 19.6–45.3)
MCH RBC QN AUTO: 31.7 PG (ref 26.6–33)
MCHC RBC AUTO-ENTMCNC: 33.3 G/DL (ref 31.5–35.7)
MCV RBC AUTO: 95.1 FL (ref 79–97)
MONOCYTES # BLD AUTO: 0.47 10*3/MM3 (ref 0.1–0.9)
MONOCYTES NFR BLD AUTO: 6.8 % (ref 5–12)
NEUTROPHILS NFR BLD AUTO: 3.24 10*3/MM3 (ref 1.7–7)
NEUTROPHILS NFR BLD AUTO: 46.9 % (ref 42.7–76)
NRBC BLD AUTO-RTO: 0.1 /100 WBC (ref 0–0.2)
PLATELET # BLD AUTO: 323 10*3/MM3 (ref 140–450)
PMV BLD AUTO: 10.4 FL (ref 6–12)
POTASSIUM SERPL-SCNC: 3.8 MMOL/L (ref 3.5–5.2)
PROT SERPL-MCNC: 8 G/DL (ref 6–8.5)
RBC # BLD AUTO: 4.45 10*6/MM3 (ref 3.77–5.28)
SODIUM SERPL-SCNC: 140 MMOL/L (ref 136–145)
TRIGL SERPL-MCNC: 154 MG/DL (ref 0–150)
TSH SERPL DL<=0.05 MIU/L-ACNC: 1.35 UIU/ML (ref 0.27–4.2)
VLDLC SERPL-MCNC: 28 MG/DL (ref 5–40)
WBC # BLD AUTO: 6.91 10*3/MM3 (ref 3.4–10.8)

## 2021-09-10 PROCEDURE — 99213 OFFICE O/P EST LOW 20 MIN: CPT | Performed by: NURSE PRACTITIONER

## 2021-09-10 PROCEDURE — 84443 ASSAY THYROID STIM HORMONE: CPT | Performed by: NURSE PRACTITIONER

## 2021-09-10 PROCEDURE — 82746 ASSAY OF FOLIC ACID SERUM: CPT | Performed by: NURSE PRACTITIONER

## 2021-09-10 PROCEDURE — 83540 ASSAY OF IRON: CPT | Performed by: NURSE PRACTITIONER

## 2021-09-10 PROCEDURE — 80061 LIPID PANEL: CPT | Performed by: NURSE PRACTITIONER

## 2021-09-10 PROCEDURE — 84466 ASSAY OF TRANSFERRIN: CPT | Performed by: NURSE PRACTITIONER

## 2021-09-10 PROCEDURE — 83036 HEMOGLOBIN GLYCOSYLATED A1C: CPT | Performed by: NURSE PRACTITIONER

## 2021-09-10 PROCEDURE — 82728 ASSAY OF FERRITIN: CPT | Performed by: NURSE PRACTITIONER

## 2021-09-10 PROCEDURE — 99396 PREV VISIT EST AGE 40-64: CPT | Performed by: NURSE PRACTITIONER

## 2021-09-10 PROCEDURE — 85025 COMPLETE CBC W/AUTO DIFF WBC: CPT | Performed by: NURSE PRACTITIONER

## 2021-09-10 PROCEDURE — 82306 VITAMIN D 25 HYDROXY: CPT | Performed by: NURSE PRACTITIONER

## 2021-09-10 PROCEDURE — 80053 COMPREHEN METABOLIC PANEL: CPT | Performed by: NURSE PRACTITIONER

## 2021-09-10 NOTE — PROGRESS NOTES
"Chief Complaint  Establish Care, Annual Exam, and Abdominal Pain    Subjective          Waleska Shah presents to [facility]    ENCOUNTER DATE:  09/10/2021    Waleska Shah / 56 y.o. / female      CHIEF COMPLAINT    Establish Care      VITALS    /88 (BP Location: Left arm, Patient Position: Sitting, Cuff Size: Adult)   Pulse 72   Temp 97.7 °F (36.5 °C) (Temporal)   Ht 167.6 cm (66\")   Wt 63.8 kg (140 lb 9.6 oz)   SpO2 98%   BMI 22.69 kg/m²     BP Readings from Last 3 Encounters:  09/10/21 : 134/88  08/05/20 : 152/87  04/14/20 : 141/92    Wt Readings from Last 3 Encounters:  09/10/21 : 63.8 kg (140 lb 9.6 oz)  08/05/20 : 65 kg (143 lb 6 oz)  04/14/20 : 63.7 kg (140 lb 8 oz)    Body mass index is 22.69 kg/m².    MEDICATIONS    Current Outpatient Medications on File Prior to Visit:  (DISCONTINUED) ARMOUR THYROID PO, Take 1 tablet by mouth Daily., Disp: , Rfl:   (DISCONTINUED) Cyanocobalamin ([employer] VITAMIN B-12) 5000 MCG sublingual tablet, Place 1 tablet under the tongue Daily., Disp: , Rfl:   (DISCONTINUED) HYDROcodone-acetaminophen (NORCO) 5-325 MG per tablet, Take 1 tablet by mouth Every 6 (Six) Hours As Needed., Disp: , Rfl:   (DISCONTINUED) promethazine (PHENERGAN) 12.5 MG tablet, Take 2 tablets by mouth Every 6 (Six) Hours As Needed (use prn nausea or emesis)., Disp: 8 tablet, Rfl: 0    No current facility-administered medications on file prior to visit.        HISTORY OF PRESENT ILLNESS    Waleska presents for annual health maintenance visit.    Last health maintenance visit: approximately 1 year ago  General health: good  Lifestyle:  Attempting to lose weight?: No   Diet: eats decently  Exercise: walks regularly  Tobacco: Regular use (~1 pack/day)   Alcohol: does not drink  Work: Full-time  Reproductive health:  Sexually active?: No   Sexual problems?: No problems  Concern for STD?: No    Sees Gynecologist?: Yes   Terri/Postmenopausal?: Yes   Domestic abuse " "concerns: No   Depression Screening:         PHQ-9 Depression Screening  Little interest or pleasure in doing things? 0  Feeling down, depressed, or hopeless? 0  Trouble falling or staying asleep, or sleeping too much?    Feeling tired or having little energy?    Poor appetite or overeating?    Feeling bad about yourself - or that you are a failure or have let yourself or your family down?    Trouble concentrating on things, such as reading the newspaper or watching television?    Moving or speaking so slowly that other people could have noticed? Or the opposite - being so fidgety or restless that you have been moving around a lot more than usual?    Thoughts that you would be better off dead, or of hurting yourself in some way?     PHQ-9 Total Score 0  If you checked off any problems, how difficult have these problems made it for you to do your work, take care of things at home, or get along with other people?    Patient Care Team:  Erick Sandhu APRN as PCP - General (Nurse Practitioner)      ALLERGIES  -- Hydrocodone -- GI Intolerance   --  PT STATES SHE IS BOTHERED ONLY BY \"GENERIC\"            HYDROCODONE.  ALSO GETS A HEADACHE AND BLURRED            VISION WITH \"GENERIC\" HYDROCODONE  -- Ibuprofen -- Nausea And Vomiting  -- Percocet (Oxycodone-Acetaminophen) -- Itching  -- Tylenol With Codeine #3 (Acetaminophen-Codeine) -- Nausea And Vomiting and Other (See                           Comments)   --  Vision blurry     PFSH:     The following portions of the patient's history were reviewed and updated as appropriate: Allergies / Current Medications / Past Medical History / Surgical History / Social History / Family History    PROBLEM LIST   Patient Active Problem List:    Right ovarian cyst      PAST MEDICAL HISTORY  Past Medical History:  No date: Acid reflux     Comment:  no meds  No date: Anxiety  No date: Arthritis     Comment:  rt knee  No date: Depression  No date: Disease of thyroid gland     " Comment:  low  No date: Ovarian cyst     Comment:  LEFT  No date: Ovarian cyst     Comment:  right side  No date: Pelvic pain     Comment:  right side  No date: Pelvic pain in female  No date: PONV (postoperative nausea and vomiting)    SURGICAL HISTORY  Past Surgical History:  2016: ABDOMINOPLASTY  1983: APPENDECTOMY  2012: CHOLECYSTECTOMY  2013: COLONOSCOPY  2008: DENTAL PROCEDURE  10/17/2016: DIAGNOSTIC LAPAROSCOPY; Bilateral     Comment:  Procedure: LAPAROSCOPY W/ JAMAL SALPINGECTOMY LT               SALPINGO-OOPHORECTOMY;  Surgeon: Bisi Donnelly MD;                Location: [facility];  Service:   5/7/2018: DIAGNOSTIC LAPAROSCOPY; Right     Comment:  Procedure: LAPAROSCOPIC RIGHT OVARIAN CYSTECTOMY LYSIS               OF ADHESIONS LEFT ABDOMINAL WALL;  Surgeon: Bisi Donnelly MD;  Location: [facility];  Service:               Gynecology  2013: ENDOSCOPY  No date: KNEE ARTHROSCOPY; Right     Comment:  X 3  1994: LAPAROSCOPIC TUBAL LIGATION; Bilateral  2001: LYMPH NODE BIOPSY; Left     Comment:  GROIN    SOCIAL HISTORY  Social History   Socioeconomic History     Marital status: Single     Spouse name: Not on file     Number of children: Not on file     Years of education: Not on file     Highest education level: Not on file   Tobacco Use     Smoking status: Current Every Day Smoker       Packs/day: 1.00       Years: 30.00       Pack years: 30       Types: Cigarettes     Smokeless tobacco: Never Used   Substance and Sexual Activity     Alcohol use: No       Comment: FORMER      Drug use: No      FAMILY HISTORY  Review of patient's family history indicates:  Problem: Colon cancer     Relation: Maternal Grandfather         Age of Onset: (Not Specified)         Comment: 50'S  Problem: Malig Hyperthermia     Relation: Neg Hx         Age of Onset: (Not Specified)      IMMUNIZATION HISTORY    There is no immunization history on file for this patient.      REVIEW OF  SYSTEMS    [unfilled]        Labs:    Lab Results      Component                Value               Date                      NA                       143                 05/08/2020                K                        4.5                 05/08/2020                CALCIUM                  10.6 (H)            05/08/2020                AST                      15                  05/08/2020                ALT                      13                  05/08/2020                BUN                      14                  05/08/2020                CREATININE               0.89                05/08/2020                CREATININE               0.64                05/08/2018                CREATININE               0.87                05/01/2018                EGFRIFNONA               98                  05/08/2018              Lab Results      Component                Value               Date                      GLU                      89                  05/08/2020                TSH                      0.835               05/08/2020                FREET4                   1.6                 05/08/2020              Lab Results      Component                Value               Date                      LDL                      160 (H)             05/08/2020                HDL                      50                  05/08/2020                TRIG                     139                 05/08/2020                CHOLHDLRATIO             4.8                 05/08/2020              Lab Results      Component                Value               Date                      [id number]                 50.7                05/08/2020               Lab Results      Component                Value               Date                      WBC                      8.67                05/08/2020                HGB                      14.5                05/08/2020                MCV                      96.2                 "05/08/2020                PLT                      322                 05/08/2020              No results found for: PROTEIN, GLUCOSEU, BLOODU, NITRITEU, LEUKOCYTESUR    HEALTH CARE MATINANCE     Cancer Screening:  Colon: Initial/Next screening at age: 51  Repeat colon cancer screening: every 10 years  Breast: Recommended monthly self exams; annual professional exam  Mammogram: every 1 year  Cervical: N/A s/p total hysterectomy  Skin: Monthly self skin examination, annual exam by health professional      LIFESTYLE COUNSELING :  Lifestyle Modifications: Continue good lifestyle choices/modifications  Safety Issues: Always wear seatbelt, Avoid texting while driving   Use sunscreen, regular skin examination  Recommended annual dental/vision examination.  Emotional/Stress/Sleep: Reviewed and  given when appropriate    Abdominal Pain  This is a recurrent problem. The current episode started more than 1 year ago. The onset quality is gradual. The problem occurs daily. The problem has been waxing and waning. The pain is located in the RUQ. The pain is moderate. The quality of the pain is sharp (tightness). The abdominal pain radiates to the right shoulder and back. Associated symptoms include anorexia.       Objective   Vital Signs:   /88 (BP Location: Left arm, Patient Position: Sitting, Cuff Size: Adult)   Pulse 72   Temp 97.7 °F (36.5 °C) (Temporal)   Ht 167.6 cm (66\")   Wt 63.8 kg (140 lb 9.6 oz)   SpO2 98%   BMI 22.69 kg/m²     Physical Exam  Vitals reviewed.   Constitutional:       Appearance: Normal appearance. She is well-developed.   HENT:      Head: Normocephalic and atraumatic.      Right Ear: External ear normal.      Left Ear: External ear normal.   Eyes:      Conjunctiva/sclera: Conjunctivae normal.      Pupils: Pupils are equal, round, and reactive to light.   Cardiovascular:      Rate and Rhythm: Normal rate and regular rhythm.      Heart sounds: No murmur heard.   No friction rub. No " gallop.    Pulmonary:      Effort: Pulmonary effort is normal.      Breath sounds: Normal breath sounds. No wheezing or rhonchi.   Abdominal:      General: Abdomen is flat.      Palpations: Abdomen is soft.      Tenderness: There is no abdominal tenderness.   Skin:     General: Skin is warm and dry.   Neurological:      Mental Status: She is alert and oriented to person, place, and time.   Psychiatric:         Mood and Affect: Mood and affect normal.         Behavior: Behavior normal.         Thought Content: Thought content normal.         Judgment: Judgment normal.        Result Review :   The following data was reviewed by: LASHANDA Urban on 09/10/2021:  Common labs    Common Labsle 9/10/21 9/10/21 9/10/21 9/10/21    1416 1416 1416 1416   Glucose    84   BUN    11   Creatinine    0.88   eGFR Non African Am    67   Sodium    140   Potassium    3.8   Chloride    101   Calcium    10.6 (A)   Albumin    5.20   Total Bilirubin    0.3   Alkaline Phosphatase    90   AST (SGOT)    21   ALT (SGPT)    11   WBC 6.91      Hemoglobin 14.1      Hematocrit 42.3      Platelets 323      Total Cholesterol   268 (A)    Triglycerides   154 (A)    HDL Cholesterol   54    LDL Cholesterol    186 (A)    Hemoglobin A1C  5.20     (A) Abnormal value                      Assessment and Plan {CC Problem List  Visit Diagnosis   ROS  Review (Popup)  Health Maintenance  Quality  BestPractice  Medications  SmartSets  SnapShot Encounters  Media :23}   Diagnoses and all orders for this visit:    1. Annual physical exam (Primary)  -     CBC & Differential  -     Comprehensive Metabolic Panel  -     Lipid Panel  -     TSH  -     Hemoglobin A1c    2. Abdominal bloating  Comments:  We will do CT abdomen and pelvis to rule out any acute underlying issues.  Orders:  -     CT Abdomen Pelvis With & Without Contrast; Future    3. Upper abdominal pain  -     CT Abdomen Pelvis With & Without Contrast; Future    4. Overactive  bladder  Comments:  Needs referrals to urogynecology  Orders:  -     Cancel: Ambulatory Referral to Obstetrics / Gynecology  -     Cancel: Ambulatory Referral to Obstetrics / Gynecology  -     Ambulatory Referral to Obstetrics / Gynecology    5. Tobacco dependency  -     CT Chest Low Dose Wo; Future    6. Chronic fatigue  Comments:  We will check labs to rule out any acute underlying issues.  Orders:  -     Vitamin B12  -     Folate  -     Vitamin D 25 Hydroxy  -     Iron Profile  -     Ferritin    7. Female stress incontinence  Comments:  Needs referral to urogynecology.  Orders:  -     Ambulatory Referral to Obstetrics / Gynecology    8. Urinary disorder        Follow Up   No follow-ups on file.  Patient was given instructions and counseling regarding her condition or for health maintenance advice. Please see specific information pulled into the AVS if appropriate.

## 2021-09-11 LAB
IRON 24H UR-MRATE: 44 MCG/DL (ref 37–145)
IRON SATN MFR SERPL: 11 % (ref 20–50)
TIBC SERPL-MCNC: 390 MCG/DL (ref 298–536)
TRANSFERRIN SERPL-MCNC: 262 MG/DL (ref 200–360)

## 2021-09-22 ENCOUNTER — HOSPITAL ENCOUNTER (OUTPATIENT)
Dept: CT IMAGING | Facility: HOSPITAL | Age: 56
Discharge: HOME | End: 2021-09-22
Admitting: NURSE PRACTITIONER

## 2021-09-22 DIAGNOSIS — F17.200 TOBACCO DEPENDENCY: ICD-10-CM

## 2021-09-22 DIAGNOSIS — R14.0 ABDOMINAL BLOATING: ICD-10-CM

## 2021-09-22 DIAGNOSIS — R10.10 UPPER ABDOMINAL PAIN: ICD-10-CM

## 2021-09-22 PROCEDURE — 74178 CT ABD&PLV WO CNTR FLWD CNTR: CPT

## 2021-09-22 PROCEDURE — 0 IOPAMIDOL PER 1 ML: Performed by: NURSE PRACTITIONER

## 2021-09-22 PROCEDURE — 71271 CT THORAX LUNG CANCER SCR C-: CPT

## 2021-09-22 RX ADMIN — IOPAMIDOL 100 ML: 755 INJECTION, SOLUTION INTRAVENOUS at 15:31

## 2021-09-23 DIAGNOSIS — N28.1 RENAL CYST, RIGHT: Primary | ICD-10-CM

## 2021-09-23 RX ORDER — BACILLUS COAGULANS/INULIN 1B-250 MG
1 CAPSULE ORAL 2 TIMES DAILY
Qty: 60 CAPSULE | Refills: 2 | Status: SHIPPED | OUTPATIENT
Start: 2021-09-23 | End: 2021-11-08

## 2021-09-23 RX ORDER — DOCUSATE SODIUM 250 MG
250 CAPSULE ORAL 2 TIMES DAILY
Qty: 60 CAPSULE | Refills: 3 | Status: SHIPPED | OUTPATIENT
Start: 2021-09-23 | End: 2021-11-08

## 2021-10-06 ENCOUNTER — OFFICE VISIT (OUTPATIENT)
Dept: FAMILY MEDICINE CLINIC | Facility: CLINIC | Age: 56
End: 2021-10-06

## 2021-10-06 VITALS
WEIGHT: 143.6 LBS | OXYGEN SATURATION: 99 % | DIASTOLIC BLOOD PRESSURE: 78 MMHG | HEIGHT: 66 IN | BODY MASS INDEX: 23.08 KG/M2 | SYSTOLIC BLOOD PRESSURE: 120 MMHG | HEART RATE: 80 BPM | TEMPERATURE: 98.2 F

## 2021-10-06 DIAGNOSIS — R10.84 GENERALIZED ABDOMINAL PAIN: Primary | ICD-10-CM

## 2021-10-06 PROCEDURE — 99214 OFFICE O/P EST MOD 30 MIN: CPT | Performed by: NURSE PRACTITIONER

## 2021-10-06 RX ORDER — FLUOXETINE HCL 20 MG
CAPSULE ORAL
COMMUNITY
Start: 2021-09-16 | End: 2022-03-10

## 2021-10-06 RX ORDER — AMOXICILLIN 875 MG/1
875 TABLET, COATED ORAL 2 TIMES DAILY
Qty: 20 TABLET | Refills: 0 | Status: SHIPPED | OUTPATIENT
Start: 2021-10-06 | End: 2021-10-16

## 2021-10-06 RX ORDER — OXYMORPHONE HYDROCHLORIDE 5 MG/1
5 TABLET ORAL AS NEEDED
COMMUNITY
End: 2022-08-12

## 2021-10-06 RX ORDER — TRAMADOL HYDROCHLORIDE 50 MG/1
50 TABLET ORAL AS NEEDED
COMMUNITY
End: 2022-03-10

## 2021-10-06 RX ORDER — HYDROCODONE BITARTRATE AND ACETAMINOPHEN 5; 325 MG/1; MG/1
1 TABLET ORAL EVERY 6 HOURS PRN
COMMUNITY
End: 2022-08-12

## 2021-10-06 NOTE — PROGRESS NOTES
Chief Complaint  Abdominal Pain    Subjective        Social History     Socioeconomic History   • Marital status:      Spouse name: Not on file   • Number of children: Not on file   • Years of education: Not on file   • Highest education level: Not on file   Tobacco Use   • Smoking status: Current Every Day Smoker     Packs/day: 1.00     Years: 30.00     Pack years: 30.00     Types: Cigarettes   • Smokeless tobacco: Never Used   Vaping Use   • Vaping Use: Never used   Substance and Sexual Activity   • Alcohol use: No     Comment: FORMER    • Drug use: No     Past Surgical History:   Procedure Laterality Date   • ABDOMINOPLASTY  2016   • APPENDECTOMY  1983   • CHOLECYSTECTOMY  2012   • COLONOSCOPY  2013   • DENTAL PROCEDURE  2008   • DIAGNOSTIC LAPAROSCOPY Bilateral 10/17/2016    Procedure: LAPAROSCOPY W/ JAMAL SALPINGECTOMY LT SALPINGO-OOPHORECTOMY;  Surgeon: Bisi Donnelly MD;  Location: [facility];  Service:    • DIAGNOSTIC LAPAROSCOPY Right 5/7/2018    Procedure: LAPAROSCOPIC RIGHT OVARIAN CYSTECTOMY LYSIS OF ADHESIONS LEFT ABDOMINAL WALL;  Surgeon: Bisi Donnelly MD;  Location: [facility];  Service: Gynecology   • ENDOSCOPY  2013   • KNEE ARTHROSCOPY Right     X 3   • LAPAROSCOPIC TUBAL LIGATION Bilateral 1994   • LYMPH NODE BIOPSY Left 2001    GROIN     Past Medical History:   Diagnosis Date   • Acid reflux     no meds   • Anxiety    • Arthritis     rt knee   • Depression    • Disease of thyroid gland     low   • Ovarian cyst     LEFT   • Ovarian cyst     right side   • Pelvic pain     right side   • Pelvic pain in female    • PONV (postoperative nausea and vomiting)        Waleska Shah presents to [facility]  Abdominal Pain  This is a chronic problem. The current episode started more than 1 year ago. The onset quality is undetermined. The problem occurs intermittently. The problem has been unchanged. The pain is located in the right flank. The pain is  "moderate. The quality of the pain is colicky and aching. The abdominal pain radiates to the RLQ. Pertinent negatives include no anorexia, nausea or vomiting. Associated symptoms comments: Constipation. The pain is relieved by nothing. She has tried nothing for the symptoms. Prior diagnostic workup includes lower endoscopy, CT scan, GI consult and ultrasound. Her past medical history is significant for abdominal surgery.       Objective   Vital Signs:   /78   Pulse 80   Temp 98.2 °F (36.8 °C)   Ht 167.6 cm (66\")   Wt 65.1 kg (143 lb 9.6 oz)   SpO2 99%   BMI 23.18 kg/m²     Physical Exam  Vitals reviewed.   Constitutional:       Appearance: Normal appearance. She is well-developed.   HENT:      Head: Normocephalic and atraumatic.   Eyes:      Conjunctiva/sclera: Conjunctivae normal.      Pupils: Pupils are equal, round, and reactive to light.   Cardiovascular:      Rate and Rhythm: Normal rate and regular rhythm.      Heart sounds: No murmur heard.     Pulmonary:      Effort: Pulmonary effort is normal.      Breath sounds: Normal breath sounds. No wheezing or rhonchi.   Abdominal:      General: There is no distension.      Palpations: Abdomen is soft.      Tenderness: There is no abdominal tenderness.   Skin:     General: Skin is warm and dry.   Neurological:      Mental Status: She is alert and oriented to person, place, and time.   Psychiatric:         Mood and Affect: Mood and affect normal.         Behavior: Behavior normal.         Thought Content: Thought content normal.         Judgment: Judgment normal.        Result Review :   The following data was reviewed by: LASHANDA Urban on 10/06/2021:  Common labs    Common Labsle 9/10/21 9/10/21 9/10/21 9/10/21    1416 1416 1416 1416   Glucose    84   BUN    11   Creatinine    0.88   eGFR Non African Am    67   Sodium    140   Potassium    3.8   Chloride    101   Calcium    10.6 (A)   Albumin    5.20   Total Bilirubin    0.3   Alkaline " Phosphatase    90   AST (SGOT)    21   ALT (SGPT)    11   WBC 6.91      Hemoglobin 14.1      Hematocrit 42.3      Platelets 323      Total Cholesterol   268 (A)    Triglycerides   154 (A)    HDL Cholesterol   54    LDL Cholesterol    186 (A)    Hemoglobin A1C  5.20     (A) Abnormal value            Data reviewed: Radiologic studies CT abdomen and pelvis     CT Abdomen Pelvis With & Without Contrast [[id number]] (Order [id number])  Order  Status: Final result   Appointment Information    Display Notes    V-SAK           PACS Images     Radiology Images   Study Result    Narrative & Impression   PROCEDURE:  CT ABDOMEN PELVIS W WO CONTRAST     COMPARISON:  None  INDICATIONS:  Abdominal pain, acute, nonlocalized     TECHNIQUE:    After obtaining the patient's consent, CT images of the abdomen were created without and   with non-ionic intravenous contrast material, and CT images of the pelvis were obtained with   non-ionic intravenous contrast material.       PROTOCOL:     Urology imaging protocol performed                 RADIATION:      DLP: 1047.7mGy*cm               Automated exposure control was utilized to minimize radiation dose.   CONTRAST:      100cc Isovue 370 I.V.  LABS:   eGFR: >60ml/min/1.73m2     FINDINGS:          Abdomen without contrast:  Included lung bases are clear.     Previous cholecystectomy.  No radiodense renal calculus.  No radiodense ureteral calculus.     Pelvis without contrast:  No radiodense bladder calculus.     Abdomen with contrast:  The kidneys enhance symmetrically.  1.6 cm mildly proteinaceous or   hemorrhagic cyst lower pole right kidney.  Adrenal glands and pancreas are unremarkable.  No liver   or splenic lesion.  Bowel loops are nondilated.  Moderate colonic stool burden.     Pelvis with contrast:  No pelvic mass or fluid.  No aggressive appearing bone change.     CONCLUSION: No acute findings.     Moderate colonic stool burden.                 Current Outpatient Medications on File  Prior to Visit   Medication Sig Dispense Refill   • Bacillus Coagulans-Inulin (Probiotic) 1-250 BILLION-MG capsule Take 1 capsule by mouth 2 (two) times a day. 60 capsule 2   • HYDROcodone-acetaminophen (Norco) 5-325 MG per tablet Take 1 tablet by mouth Every 6 (Six) Hours As Needed.     • oxyMORphone (Opana) 5 MG tablet Take 5 mg by mouth As Needed for Moderate Pain .     • PROzac 20 MG capsule      • traMADol (ULTRAM) 50 MG tablet Take 50 mg by mouth As Needed for Moderate Pain . Pt only takes half a tab as needed.     • docusate sodium (COLACE) 250 MG capsule Take 1 capsule by mouth 2 (Two) Times a Day. 60 capsule 3     No current facility-administered medications on file prior to visit.       Assessment and Plan    Diagnoses and all orders for this visit:    1. Generalized abdominal pain (Primary)  Comments:  Patient's had multiple colonoscopies she is seen GI she has had polyps removed differential diagnosis of IBS with constipation patient states she felt better after taking amoxicillin and would like to try it again.  We will have patient follow-up in 1 month.    Other orders  -     amoxicillin (AMOXIL) 875 MG tablet; Take 1 tablet by mouth 2 (Two) Times a Day for 10 days.  Dispense: 20 tablet; Refill: 0        Follow Up   Return in about 1 month (around 11/6/2021), or if symptoms worsen or fail to improve.  Patient was given instructions and counseling regarding her condition or for health maintenance advice. Please see specific information pulled into the AVS if appropriate.

## 2021-10-07 ENCOUNTER — HOSPITAL ENCOUNTER (OUTPATIENT)
Dept: ULTRASOUND IMAGING | Facility: HOSPITAL | Age: 56
Discharge: HOME | End: 2021-10-07
Admitting: NURSE PRACTITIONER

## 2021-10-07 DIAGNOSIS — N28.1 RENAL CYST, RIGHT: ICD-10-CM

## 2021-10-07 PROCEDURE — 76775 US EXAM ABDO BACK WALL LIM: CPT

## 2021-11-01 ENCOUNTER — TELEPHONE (OUTPATIENT)
Dept: FAMILY MEDICINE CLINIC | Facility: CLINIC | Age: 56
End: 2021-11-01

## 2021-11-08 ENCOUNTER — TELEPHONE (OUTPATIENT)
Dept: FAMILY MEDICINE CLINIC | Facility: CLINIC | Age: 56
End: 2021-11-08

## 2021-11-08 ENCOUNTER — OFFICE VISIT (OUTPATIENT)
Dept: FAMILY MEDICINE CLINIC | Facility: CLINIC | Age: 56
End: 2021-11-08

## 2021-11-08 VITALS
HEIGHT: 66 IN | WEIGHT: 139.6 LBS | TEMPERATURE: 98.2 F | BODY MASS INDEX: 22.43 KG/M2 | DIASTOLIC BLOOD PRESSURE: 74 MMHG | OXYGEN SATURATION: 99 % | HEART RATE: 76 BPM | SYSTOLIC BLOOD PRESSURE: 122 MMHG

## 2021-11-08 DIAGNOSIS — R39.9 URINARY SYMPTOM OR SIGN: ICD-10-CM

## 2021-11-08 DIAGNOSIS — Z09 FOLLOW UP: Primary | ICD-10-CM

## 2021-11-08 DIAGNOSIS — M53.3 COCCYX PAIN: ICD-10-CM

## 2021-11-08 DIAGNOSIS — Z76.0 MEDICATION REFILL: ICD-10-CM

## 2021-11-08 LAB
BILIRUB BLD-MCNC: NEGATIVE MG/DL
CLARITY, POC: CLEAR
COLOR UR: YELLOW
EXPIRATION DATE: ABNORMAL
GLUCOSE UR STRIP-MCNC: NEGATIVE MG/DL
KETONES UR QL: NEGATIVE
LEUKOCYTE EST, POC: NEGATIVE
Lab: ABNORMAL
NITRITE UR-MCNC: NEGATIVE MG/ML
PH UR: 5.5 [PH] (ref 5–8)
PROT UR STRIP-MCNC: NEGATIVE MG/DL
RBC # UR STRIP: ABNORMAL /UL
SP GR UR: 1 (ref 1–1.03)
UROBILINOGEN UR QL: NORMAL

## 2021-11-08 PROCEDURE — 81003 URINALYSIS AUTO W/O SCOPE: CPT | Performed by: NURSE PRACTITIONER

## 2021-11-08 PROCEDURE — 99214 OFFICE O/P EST MOD 30 MIN: CPT | Performed by: NURSE PRACTITIONER

## 2021-11-08 RX ORDER — LACTOBACILLUS ACIDOPHILUS 20B CELL
1 CAPSULE ORAL DAILY
Qty: 90 CAPSULE | Refills: 1 | Status: SHIPPED | OUTPATIENT
Start: 2021-11-08 | End: 2022-03-10

## 2021-11-08 RX ORDER — L.ACIDOPH/B.ANIMALIS/B.LONGUM 15B CELL
1 CAPSULE ORAL 2 TIMES DAILY
COMMUNITY
Start: 2021-10-24 | End: 2022-03-10

## 2021-11-08 RX ORDER — LACTOBACILLUS ACIDOPHILUS 20B CELL
CAPSULE ORAL
COMMUNITY
End: 2021-11-08 | Stop reason: SDUPTHER

## 2021-11-08 NOTE — PROGRESS NOTES
Chief Complaint  Follow-up and Flank Pain    Subjective        Social History     Socioeconomic History   • Marital status:    Tobacco Use   • Smoking status: Current Every Day Smoker     Packs/day: 1.00     Years: 30.00     Pack years: 30.00     Types: Cigarettes   • Smokeless tobacco: Never Used   Vaping Use   • Vaping Use: Never used   Substance and Sexual Activity   • Alcohol use: No     Comment: FORMER    • Drug use: No     Past Medical History:   Diagnosis Date   • Acid reflux     no meds   • Anxiety    • Arthritis     rt knee   • Depression    • Disease of thyroid gland     low   • Ovarian cyst     LEFT   • Ovarian cyst     right side   • Pelvic pain     right side   • Pelvic pain in female    • PONV (postoperative nausea and vomiting)      Past Surgical History:   Procedure Laterality Date   • ABDOMINOPLASTY  2016   • APPENDECTOMY  1983   • CHOLECYSTECTOMY  2012   • COLONOSCOPY  2013   • DENTAL PROCEDURE  2008   • DIAGNOSTIC LAPAROSCOPY Bilateral 10/17/2016    Procedure: LAPAROSCOPY W/ JAMAL SALPINGECTOMY LT SALPINGO-OOPHORECTOMY;  Surgeon: Bisi Donnelly MD;  Location: [facility];  Service:    • DIAGNOSTIC LAPAROSCOPY Right 5/7/2018    Procedure: LAPAROSCOPIC RIGHT OVARIAN CYSTECTOMY LYSIS OF ADHESIONS LEFT ABDOMINAL WALL;  Surgeon: Bisi Donnelly MD;  Location: [facility];  Service: Gynecology   • ENDOSCOPY  2013   • KNEE ARTHROSCOPY Right     X 3   • LAPAROSCOPIC TUBAL LIGATION Bilateral 1994   • LYMPH NODE BIOPSY Left 2001    GROIN       Waleska Shah presents to [facility]  Difficulty Urinating  This is a new (Change in color of urine) problem. The current episode started yesterday. Associated symptoms include urinary symptoms (change in color). Pertinent negatives include no vomiting.   Back Pain  This is a recurrent (coccyx pain) problem. The current episode started more than 1 year ago. The problem occurs intermittently. The problem has been  "waxing and waning since onset. The pain is present in the sacro-iliac. The quality of the pain is described as cramping. The pain does not radiate. Exacerbated by: Bearing down to have a stool sample. Pertinent negatives include no bladder incontinence, bowel incontinence, dysuria, paresthesias or perianal numbness. Treatments tried: Colonoscopy does not show any abnormalities patient states that she does feel like she has change in stool caliber.       Objective   Vital Signs:   /74   Pulse 76   Temp 98.2 °F (36.8 °C)   Ht 167.6 cm (66\")   Wt 63.3 kg (139 lb 9.6 oz)   SpO2 99%   BMI 22.53 kg/m²     Physical Exam  Vitals reviewed.   Constitutional:       Appearance: Normal appearance. She is well-developed.   HENT:      Head: Normocephalic and atraumatic.      Right Ear: External ear normal.      Left Ear: External ear normal.      Mouth/Throat:      Pharynx: No oropharyngeal exudate.   Eyes:      Conjunctiva/sclera: Conjunctivae normal.      Pupils: Pupils are equal, round, and reactive to light.   Cardiovascular:      Rate and Rhythm: Normal rate and regular rhythm.      Heart sounds: No murmur heard.      Pulmonary:      Effort: Pulmonary effort is normal.      Breath sounds: Normal breath sounds. No wheezing or rhonchi.   Abdominal:      Palpations: Abdomen is soft.      Tenderness: There is no abdominal tenderness.   Skin:     General: Skin is warm and dry.   Neurological:      Mental Status: She is alert and oriented to person, place, and time.      Cranial Nerves: No cranial nerve deficit.   Psychiatric:         Mood and Affect: Mood and affect normal.         Behavior: Behavior normal.         Thought Content: Thought content normal.         Judgment: Judgment normal.        Result Review :   The following data was reviewed by: LASHANDA Urban on 11/08/2021:  Common labs    Common Labsle 9/10/21 9/10/21 9/10/21 9/10/21    1416 1416 1416 1416   Glucose    84   BUN    11   Creatinine    " 0.88   eGFR Non African Am    67   Sodium    140   Potassium    3.8   Chloride    101   Calcium    10.6 (A)   Albumin    5.20   Total Bilirubin    0.3   Alkaline Phosphatase    90   AST (SGOT)    21   ALT (SGPT)    11   WBC 6.91      Hemoglobin 14.1      Hematocrit 42.3      Platelets 323      Total Cholesterol   268 (A)    Triglycerides   154 (A)    HDL Cholesterol   54    LDL Cholesterol    186 (A)    Hemoglobin A1C  5.20     (A) Abnormal value                      Assessment and Plan    Diagnoses and all orders for this visit:    1. Follow up (Primary)    2. Medication refill  -     Cancel: XR Sacrum & Coccyx; Future    3. Coccyx pain  Comments:  Ongoing recurrent issue for about a year now.  We will do an x-ray to rule out any acute findings  Orders:  -     XR Sacrum & Coccyx; Future    4. Urinary symptom or sign  Comments:  Change in urine color negative UA in office but a small amount of blood.  We will continue to monitor  Orders:  -     POCT urinalysis dipstick, automated    Other orders  -     Lactobacillus (Florajen Acidophilus) capsule; Take 1 capsule by mouth Daily.  Dispense: 90 capsule; Refill: 1        Follow Up   Return if symptoms worsen or fail to improve.  Patient was given instructions and counseling regarding her condition or for health maintenance advice. Please see specific information pulled into the AVS if appropriate.

## 2021-11-09 ENCOUNTER — HOSPITAL ENCOUNTER (OUTPATIENT)
Dept: GENERAL RADIOLOGY | Facility: HOSPITAL | Age: 56
Discharge: HOME | End: 2021-11-09
Admitting: NURSE PRACTITIONER

## 2021-11-09 DIAGNOSIS — M53.3 COCCYX PAIN: ICD-10-CM

## 2021-11-09 PROCEDURE — 72220 X-RAY EXAM SACRUM TAILBONE: CPT

## 2021-11-10 ENCOUNTER — TELEPHONE (OUTPATIENT)
Dept: FAMILY MEDICINE CLINIC | Facility: CLINIC | Age: 56
End: 2021-11-10

## 2021-11-10 NOTE — TELEPHONE ENCOUNTER
Caller: Waleska Shah    Relationship: Self    Best call back number: [phone]  What is the best time to reach you: ANYTIME    Who are you requesting to speak with (clinical staff, provider,  specific staff member): CLINICAL    Do you know the name of the person who called:N/A    What was the call regarding: PATIENT RETURNED SAGE'S CALL ABOUT HER URINALYSIS RESULTS.    Do you require a callback:YES

## 2021-11-15 ENCOUNTER — TELEPHONE (OUTPATIENT)
Dept: FAMILY MEDICINE CLINIC | Facility: CLINIC | Age: 56
End: 2021-11-15

## 2021-11-15 NOTE — TELEPHONE ENCOUNTER
Caller: Waleska Shah    Relationship: Self    Best call back number: [phone]     Caller requesting test results: SELF    What test was performed: XRAY    Additional notes: PATIENT WANTS SOMEONE TO CALL HER WITH XRAY RESULTS

## 2021-11-15 NOTE — TELEPHONE ENCOUNTER
Pt wants to know if you were going to send in any other tests or if you were wanting to refer here somewhere else, pt wants to know what the next steps would be for her?

## 2021-11-16 ENCOUNTER — TELEPHONE (OUTPATIENT)
Dept: FAMILY MEDICINE CLINIC | Facility: CLINIC | Age: 56
End: 2021-11-16

## 2021-11-16 NOTE — TELEPHONE ENCOUNTER
Pt states she saw message stating 11/8 X-ray of the sacrum/coccyx was normal. Pt wanting to know what her next steps are since she continues to have pain in the area. Please advise.

## 2021-11-17 ENCOUNTER — TELEPHONE (OUTPATIENT)
Dept: FAMILY MEDICINE CLINIC | Facility: CLINIC | Age: 56
End: 2021-11-17

## 2021-11-17 DIAGNOSIS — G89.29 CHRONIC SACROILIAC PAIN: ICD-10-CM

## 2021-11-17 DIAGNOSIS — M53.3 COCCYX PAIN: Primary | ICD-10-CM

## 2021-11-17 DIAGNOSIS — G89.29 CHRONIC COCCYGEAL PAIN: ICD-10-CM

## 2021-11-17 DIAGNOSIS — M53.3 CHRONIC COCCYGEAL PAIN: ICD-10-CM

## 2021-11-17 DIAGNOSIS — M53.3 CHRONIC SACROILIAC PAIN: ICD-10-CM

## 2021-11-17 NOTE — TELEPHONE ENCOUNTER
Caller: Waleska Shah    Relationship: Self    Best call back number: [phone]    What is the best time to reach you: ANYTIME    Who are you requesting to speak with (clinical staff, provider,  specific staff member): NURSE     What was the call regarding: PATIENT WOULD LIKE SOMEONE TO CALL HER.  WOULD NOT DISCLOSE WHAT IT WAS IN REGARDS TO.     Do you require a callback: YES

## 2021-11-19 NOTE — TELEPHONE ENCOUNTER
If patient wants we can place MRI of coccyx/Sacral area without contrast. Let me know what patient decides -per previous message from Erick

## 2021-11-19 NOTE — TELEPHONE ENCOUNTER
If patient wants we can place MRI of coccyx/Sacral area without contrast. Let me know what patient decides

## 2021-11-29 ENCOUNTER — TELEPHONE (OUTPATIENT)
Dept: FAMILY MEDICINE CLINIC | Facility: CLINIC | Age: 56
End: 2021-11-29

## 2021-12-15 ENCOUNTER — HOSPITAL ENCOUNTER (OUTPATIENT)
Dept: MRI IMAGING | Facility: HOSPITAL | Age: 56
Discharge: HOME | End: 2021-12-15
Admitting: NURSE PRACTITIONER

## 2021-12-15 DIAGNOSIS — M53.3 CHRONIC COCCYGEAL PAIN: ICD-10-CM

## 2021-12-15 DIAGNOSIS — G89.29 CHRONIC SACROILIAC PAIN: ICD-10-CM

## 2021-12-15 DIAGNOSIS — G89.29 CHRONIC COCCYGEAL PAIN: ICD-10-CM

## 2021-12-15 DIAGNOSIS — M53.3 COCCYX PAIN: ICD-10-CM

## 2021-12-15 DIAGNOSIS — M53.3 CHRONIC SACROILIAC PAIN: ICD-10-CM

## 2021-12-15 PROCEDURE — A9577 INJ MULTIHANCE: HCPCS | Performed by: NURSE PRACTITIONER

## 2021-12-15 PROCEDURE — 72197 MRI PELVIS W/O & W/DYE: CPT

## 2021-12-15 PROCEDURE — 0 GADOBENATE DIMEGLUMINE 529 MG/ML SOLUTION: Performed by: NURSE PRACTITIONER

## 2021-12-15 RX ADMIN — GADOBENATE DIMEGLUMINE 15 ML: 529 INJECTION, SOLUTION INTRAVENOUS at 13:45

## 2021-12-17 ENCOUNTER — TELEPHONE (OUTPATIENT)
Dept: FAMILY MEDICINE CLINIC | Facility: CLINIC | Age: 56
End: 2021-12-17

## 2021-12-17 NOTE — TELEPHONE ENCOUNTER
informed patient of MRI results. patient verbalized understanding of stool softner    ----- Message from LASHANDA Urban sent at 12/16/2021 12:49 PM EST -----  MRI of the pelvis did not show any acute findings that would explain patient's symptoms.  It does show a moderately distended urinary bladder so I am unsure patient needed to go to the bathroom when she was doing the MRI.  It also shows that she is constipated and has a moderate stool burden in the rectum that could be causing her symptoms.  I recommend patient to be taking stool softeners she can do MiraLAX over-the-counter or I can send it over that is a stool softener that she has to take daily for her to work.  I would also recommend a stool softener to take at nighttime and see if that does not help.

## 2022-01-21 ENCOUNTER — OFFICE VISIT (OUTPATIENT)
Dept: FAMILY MEDICINE CLINIC | Facility: CLINIC | Age: 57
End: 2022-01-21

## 2022-01-21 VITALS
HEART RATE: 64 BPM | WEIGHT: 146.2 LBS | OXYGEN SATURATION: 96 % | HEIGHT: 66 IN | BODY MASS INDEX: 23.5 KG/M2 | DIASTOLIC BLOOD PRESSURE: 88 MMHG | TEMPERATURE: 97.9 F | SYSTOLIC BLOOD PRESSURE: 130 MMHG

## 2022-01-21 DIAGNOSIS — J02.9 SORE THROAT: ICD-10-CM

## 2022-01-21 DIAGNOSIS — J06.9 UPPER RESPIRATORY INFECTION WITH COUGH AND CONGESTION: ICD-10-CM

## 2022-01-21 DIAGNOSIS — R68.89 FLU-LIKE SYMPTOMS: Primary | ICD-10-CM

## 2022-01-21 PROCEDURE — 99214 OFFICE O/P EST MOD 30 MIN: CPT | Performed by: NURSE PRACTITIONER

## 2022-01-21 PROCEDURE — U0004 COV-19 TEST NON-CDC HGH THRU: HCPCS | Performed by: NURSE PRACTITIONER

## 2022-01-21 RX ORDER — BROMPHENIRAMINE MALEATE, PSEUDOEPHEDRINE HYDROCHLORIDE, AND DEXTROMETHORPHAN HYDROBROMIDE 2; 30; 10 MG/5ML; MG/5ML; MG/5ML
5 SYRUP ORAL 4 TIMES DAILY PRN
Qty: 473 ML | Refills: 0 | Status: SHIPPED | OUTPATIENT
Start: 2022-01-21 | End: 2022-03-10

## 2022-01-21 RX ORDER — METHYLPREDNISOLONE 4 MG/1
TABLET ORAL
Qty: 1 EACH | Refills: 0 | Status: SHIPPED | OUTPATIENT
Start: 2022-01-21 | End: 2022-03-10

## 2022-01-21 RX ORDER — AMOXICILLIN 500 MG/1
1000 CAPSULE ORAL 2 TIMES DAILY
Qty: 20 CAPSULE | Refills: 0 | Status: SHIPPED | OUTPATIENT
Start: 2022-01-21 | End: 2022-03-10

## 2022-01-21 NOTE — PROGRESS NOTES
Chief Complaint  Cough, Headache, Earache, and Chills    Subjective          Medical History: has a past medical history of Acid reflux, Anxiety, Arthritis, Depression, Disease of thyroid gland, Ovarian cyst, Ovarian cyst, Pelvic pain, Pelvic pain in female, and PONV (postoperative nausea and vomiting).     Surgical History: has a past surgical history that includes Appendectomy (1983); Lymph node biopsy (Left, 2001); Cholecystectomy (2012); Laparoscopic tubal ligation (Bilateral, 1994); Abdominoplasty (2016); Knee arthroscopy (Right); Dental surgery (2008); Laparoscopy (Bilateral, 10/17/2016); Colonoscopy (2013); Laparoscopy (Right, 5/7/2018); and Esophagogastroduodenoscopy (2013).     Family History: family history includes Colon cancer in her maternal grandfather.     Social History: reports that she has been smoking cigarettes. She has a 30.00 pack-year smoking history. She has never used smokeless tobacco. She reports that she does not drink alcohol and does not use drugs.    Waleska Shah presents to [facility]  Positive COVID  Exposure, geharjeeton and Son had it    Cough  This is a new problem. The current episode started 1 to 4 weeks ago. The problem has been waxing and waning. The problem occurs every few minutes. Associated symptoms include chills, ear congestion, headaches (frontal with sinus pressure), nasal congestion, rhinorrhea and a sore throat. Pertinent negatives include no fever. Associated symptoms comments: Sinus pressure. Risk factors for lung disease include smoking/tobacco exposure. She has tried nothing for the symptoms. The treatment provided no relief.   Sore Throat   This is a new problem. The current episode started in the past 7 days. The problem has been waxing and waning. There has been no fever. The pain is mild. Associated symptoms include congestion, coughing, headaches (frontal with sinus pressure) and a plugged ear sensation. She has tried  "nothing for the symptoms. The treatment provided no relief.       Objective   Vital Signs:   /88   Pulse 64   Temp 97.9 °F (36.6 °C)   Ht 167.6 cm (66\")   Wt 66.3 kg (146 lb 3.2 oz)   SpO2 96%   BMI 23.60 kg/m²     Physical Exam  Vitals reviewed.   Constitutional:       Appearance: Normal appearance. She is well-developed.   HENT:      Head: Normocephalic and atraumatic.      Right Ear: External ear normal.      Left Ear: External ear normal.      Mouth/Throat:      Mouth: Mucous membranes are moist.      Pharynx: Posterior oropharyngeal erythema present.      Comments: Clear postnasal drip  Eyes:      Conjunctiva/sclera: Conjunctivae normal.      Pupils: Pupils are equal, round, and reactive to light.   Cardiovascular:      Rate and Rhythm: Normal rate and regular rhythm.      Heart sounds: No murmur heard.  No friction rub.   Pulmonary:      Effort: Pulmonary effort is normal.      Breath sounds: Normal breath sounds. No wheezing or rhonchi.   Lymphadenopathy:      Cervical: Cervical adenopathy present.   Skin:     General: Skin is warm and dry.   Neurological:      Mental Status: She is alert and oriented to person, place, and time.   Psychiatric:         Mood and Affect: Mood and affect normal.         Behavior: Behavior normal.         Thought Content: Thought content normal.         Judgment: Judgment normal.        Result Review :   The following data was reviewed by: LASHANDA Urban on 01/21/2022:  Common labs    Common Labsle 9/10/21 9/10/21 9/10/21 9/10/21    1416 1416 1416 1416   Glucose    84   BUN    11   Creatinine    0.88   eGFR Non African Am    67   Sodium    140   Potassium    3.8   Chloride    101   Calcium    10.6 (A)   Albumin    5.20   Total Bilirubin    0.3   Alkaline Phosphatase    90   AST (SGOT)    21   ALT (SGPT)    11   WBC 6.91      Hemoglobin 14.1      Hematocrit 42.3      Platelets 323      Total Cholesterol   268 (A)    Triglycerides   154 (A)    HDL Cholesterol "   54    LDL Cholesterol    186 (A)    Hemoglobin A1C  5.20     (A) Abnormal value                        Current Outpatient Medications on File Prior to Visit   Medication Sig Dispense Refill   • HYDROcodone-acetaminophen (Norco) 5-325 MG per tablet Take 1 tablet by mouth Every 6 (Six) Hours As Needed.     • Lactobacillus (Florajen Acidophilus) capsule Take 1 capsule by mouth Daily. 90 capsule 1   • oxyMORphone (Opana) 5 MG tablet Take 5 mg by mouth As Needed for Moderate Pain .     • Probiotic Product (Florajen Digestion) capsule Take 1 capsule by mouth 2 (Two) Times a Day.     • PROzac 20 MG capsule      • traMADol (ULTRAM) 50 MG tablet Take 50 mg by mouth As Needed for Moderate Pain . Pt only takes half a tab as needed.       No current facility-administered medications on file prior to visit.      No results found for: RAPSCRN negative strep  Assessment and Plan    Diagnoses and all orders for this visit:    1. Flu-like symptoms (Primary)  -     COVID-19,APTIMA PANTHER(MARY),[facility]/ [facility], NP/OP SWAB IN UTM/VTM/[facility] [employer]SP[facility] MEDIA,24 HR TAT - Swab, Nasopharynx    2. Sore throat  Comments:  Negative strep we will treat symptomatically.    3. Upper respiratory infection with cough and congestion  Comments:  Will check for COVID.  Patient has an amoxicillin allergy, she states she can take Amoxil 500 she cannot tolerate the 875 mg.  We will send over amoxicillin, Bromfed and steroid pack.  Discussed return precautions.  Patient verbalized understanding is agreeable treatment plan.    Other orders  -     brompheniramine-pseudoephedrine-DM 30-2-10 MG/5ML syrup; Take 5 mL by mouth 4 (Four) Times a Day As Needed for Congestion or Cough.  Dispense: 473 mL; Refill: 0  -     amoxicillin (AMOXIL) 500 MG capsule; Take 2 capsules by mouth 2 (Two) Times a Day.  Dispense: 20 capsule; Refill: 0  -     methylPREDNISolone (MEDROL) 4 MG dose pack; Take as directed on package instructions.  Dispense: 1 each; Refill:  0        Follow Up   Return for If symptoms do not improve new concerning symptoms.  Patient was given instructions and counseling regarding her condition or for health maintenance advice. Please see specific information pulled into the AVS if appropriate.

## 2022-01-23 LAB — SARS-COV-2 RNA PNL SPEC NAA+PROBE: NOT DETECTED

## 2022-02-02 ENCOUNTER — TELEPHONE (OUTPATIENT)
Dept: FAMILY MEDICINE CLINIC | Facility: CLINIC | Age: 57
End: 2022-02-02

## 2022-02-02 RX ORDER — AZITHROMYCIN 250 MG/1
TABLET, FILM COATED ORAL
Qty: 6 TABLET | Refills: 0 | Status: SHIPPED | OUTPATIENT
Start: 2022-02-02 | End: 2022-03-10

## 2022-02-02 NOTE — TELEPHONE ENCOUNTER
Caller: Waleska Shah    Relationship: Self      What medication are you requesting: ANTIBIOTIC     What are your current symptoms: RUNNY NOSE AND CONGESTION   How long have you been experiencing symptoms: 7 DAYS     Have you had these symptoms before:    [x] Yes  [] No    Have you been treated for these symptoms before:   [x] Yes  [] No    If a prescription is needed, what is your preferred pharmacy and phone number: [id number] - [place], [place] - [address] AT [facility] [phone] [facility] [phone] FX     Additional notes: PT STATES SHE ON STEROID AND IT'S NO HELPING

## 2022-02-02 NOTE — TELEPHONE ENCOUNTER
Let patient know that I have sent over a Z-pack, (antibiotic). If no improvement patient needs to be seen

## 2022-02-02 NOTE — TELEPHONE ENCOUNTER
Pt informed via phone call that she need to be seen if she sees no improvement after starting the antibiotic.

## 2022-02-08 ENCOUNTER — TELEPHONE (OUTPATIENT)
Dept: FAMILY MEDICINE CLINIC | Facility: CLINIC | Age: 57
End: 2022-02-08

## 2022-02-08 NOTE — TELEPHONE ENCOUNTER
Caller: Waleska Shah    Relationship: Self    Best call back number: [phone]    What is the best time to reach you: ANY     Who are you requesting to speak with (clinical staff, provider,  specific staff member): CLINICAL         What was the call regarding:PATIENT STATES THAT SHE IS NOT DOING ANY BETTER WITH THE MEDICATION THAT WAS PRESCRIBED. STATES THAT HER THROAT IS SWOLLEN, NO TASTE, NO SMELL, RIGHT SIDE OF HEAD HAS PRESSURE AND FEELS LIKE PRESSURE BEHIND HER EYE    Do you require a callback: YES

## 2022-02-09 RX ORDER — DOXYCYCLINE HYCLATE 100 MG/1
100 CAPSULE ORAL 2 TIMES DAILY
Qty: 20 CAPSULE | Refills: 0 | Status: SHIPPED | OUTPATIENT
Start: 2022-02-09 | End: 2022-02-19

## 2022-02-09 NOTE — TELEPHONE ENCOUNTER
Let patient know that since the Amoxil did not help, I will send over Doxycycline. This is more of a broad spectrum antibiotic and will cover more bacteria than the Amoxil

## 2022-03-10 ENCOUNTER — OFFICE VISIT (OUTPATIENT)
Dept: FAMILY MEDICINE CLINIC | Facility: CLINIC | Age: 57
End: 2022-03-10

## 2022-03-10 VITALS
HEIGHT: 66 IN | BODY MASS INDEX: 23.5 KG/M2 | SYSTOLIC BLOOD PRESSURE: 142 MMHG | TEMPERATURE: 97.4 F | HEART RATE: 91 BPM | WEIGHT: 146.2 LBS | DIASTOLIC BLOOD PRESSURE: 88 MMHG | OXYGEN SATURATION: 99 %

## 2022-03-10 DIAGNOSIS — H54.7 VISION PROBLEM: ICD-10-CM

## 2022-03-10 DIAGNOSIS — R05.9 COUGH: Primary | ICD-10-CM

## 2022-03-10 DIAGNOSIS — R06.02 SOB (SHORTNESS OF BREATH): ICD-10-CM

## 2022-03-10 DIAGNOSIS — N32.81 OVERACTIVE BLADDER: ICD-10-CM

## 2022-03-10 DIAGNOSIS — N39.3 STRESS INCONTINENCE IN FEMALE: ICD-10-CM

## 2022-03-10 LAB
ALBUMIN SERPL-MCNC: 5.1 G/DL (ref 3.5–5.2)
ALBUMIN/GLOB SERPL: 1.8 G/DL
ALP SERPL-CCNC: 89 U/L (ref 39–117)
ALT SERPL W P-5'-P-CCNC: 11 U/L (ref 1–33)
ANION GAP SERPL CALCULATED.3IONS-SCNC: 10.1 MMOL/L (ref 5–15)
AST SERPL-CCNC: 17 U/L (ref 1–32)
BASOPHILS # BLD AUTO: 0.05 10*3/MM3 (ref 0–0.2)
BASOPHILS NFR BLD AUTO: 0.6 % (ref 0–1.5)
BILIRUB BLD-MCNC: NEGATIVE MG/DL
BILIRUB SERPL-MCNC: 0.2 MG/DL (ref 0–1.2)
BUN SERPL-MCNC: 15 MG/DL (ref 6–20)
BUN/CREAT SERPL: 17.2 (ref 7–25)
CALCIUM SPEC-SCNC: 10.8 MG/DL (ref 8.6–10.5)
CHLORIDE SERPL-SCNC: 103 MMOL/L (ref 98–107)
CLARITY, POC: CLEAR
CO2 SERPL-SCNC: 26.9 MMOL/L (ref 22–29)
COLOR UR: ABNORMAL
CREAT SERPL-MCNC: 0.87 MG/DL (ref 0.57–1)
DEPRECATED RDW RBC AUTO: 43.6 FL (ref 37–54)
EGFRCR SERPLBLD CKD-EPI 2021: 78.8 ML/MIN/1.73
EOSINOPHIL # BLD AUTO: 0.08 10*3/MM3 (ref 0–0.4)
EOSINOPHIL NFR BLD AUTO: 1 % (ref 0.3–6.2)
ERYTHROCYTE [DISTWIDTH] IN BLOOD BY AUTOMATED COUNT: 12.7 % (ref 12.3–15.4)
EXPIRATION DATE: ABNORMAL
GLOBULIN UR ELPH-MCNC: 2.8 GM/DL
GLUCOSE SERPL-MCNC: 94 MG/DL (ref 65–99)
GLUCOSE UR STRIP-MCNC: NEGATIVE MG/DL
HCT VFR BLD AUTO: 41.7 % (ref 34–46.6)
HGB BLD-MCNC: 13.8 G/DL (ref 12–15.9)
IMM GRANULOCYTES # BLD AUTO: 0.01 10*3/MM3 (ref 0–0.05)
IMM GRANULOCYTES NFR BLD AUTO: 0.1 % (ref 0–0.5)
KETONES UR QL: NEGATIVE
LEUKOCYTE EST, POC: NEGATIVE
LYMPHOCYTES # BLD AUTO: 4.38 10*3/MM3 (ref 0.7–3.1)
LYMPHOCYTES NFR BLD AUTO: 53.7 % (ref 19.6–45.3)
Lab: ABNORMAL
MCH RBC QN AUTO: 31 PG (ref 26.6–33)
MCHC RBC AUTO-ENTMCNC: 33.1 G/DL (ref 31.5–35.7)
MCV RBC AUTO: 93.7 FL (ref 79–97)
MONOCYTES # BLD AUTO: 0.5 10*3/MM3 (ref 0.1–0.9)
MONOCYTES NFR BLD AUTO: 6.1 % (ref 5–12)
NEUTROPHILS NFR BLD AUTO: 3.13 10*3/MM3 (ref 1.7–7)
NEUTROPHILS NFR BLD AUTO: 38.5 % (ref 42.7–76)
NITRITE UR-MCNC: NEGATIVE MG/ML
NRBC BLD AUTO-RTO: 0 /100 WBC (ref 0–0.2)
PH UR: 6 [PH] (ref 5–8)
PLATELET # BLD AUTO: 341 10*3/MM3 (ref 140–450)
PMV BLD AUTO: 10.5 FL (ref 6–12)
POTASSIUM SERPL-SCNC: 4.6 MMOL/L (ref 3.5–5.2)
PROT SERPL-MCNC: 7.9 G/DL (ref 6–8.5)
PROT UR STRIP-MCNC: NEGATIVE MG/DL
RBC # BLD AUTO: 4.45 10*6/MM3 (ref 3.77–5.28)
RBC # UR STRIP: ABNORMAL /UL
SODIUM SERPL-SCNC: 140 MMOL/L (ref 136–145)
SP GR UR: 1.02 (ref 1–1.03)
UROBILINOGEN UR QL: NORMAL
WBC NRBC COR # BLD: 8.15 10*3/MM3 (ref 3.4–10.8)

## 2022-03-10 PROCEDURE — 80053 COMPREHEN METABOLIC PANEL: CPT | Performed by: NURSE PRACTITIONER

## 2022-03-10 PROCEDURE — 81003 URINALYSIS AUTO W/O SCOPE: CPT | Performed by: NURSE PRACTITIONER

## 2022-03-10 PROCEDURE — 85025 COMPLETE CBC W/AUTO DIFF WBC: CPT | Performed by: NURSE PRACTITIONER

## 2022-03-10 PROCEDURE — 99213 OFFICE O/P EST LOW 20 MIN: CPT | Performed by: NURSE PRACTITIONER

## 2022-03-10 RX ORDER — ERGOCALCIFEROL 1.25 MG/1
50000 CAPSULE ORAL WEEKLY
Qty: 13 CAPSULE | Refills: 0 | Status: SHIPPED | OUTPATIENT
Start: 2022-03-10 | End: 2022-06-06

## 2022-03-10 RX ORDER — BROMPHENIRAMINE MALEATE, PSEUDOEPHEDRINE HYDROCHLORIDE, AND DEXTROMETHORPHAN HYDROBROMIDE 2; 30; 10 MG/5ML; MG/5ML; MG/5ML
5 SYRUP ORAL 4 TIMES DAILY PRN
Qty: 473 ML | Refills: 1 | Status: SHIPPED | OUTPATIENT
Start: 2022-03-10 | End: 2022-08-12

## 2022-03-10 RX ORDER — MAGNESIUM 200 MG
1 TABLET ORAL DAILY
Qty: 90 TABLET | Refills: 0 | Status: SHIPPED | OUTPATIENT
Start: 2022-03-10 | End: 2022-12-14

## 2022-03-10 RX ORDER — MAGNESIUM 200 MG
1 TABLET ORAL DAILY
COMMUNITY
End: 2022-03-10 | Stop reason: SDUPTHER

## 2022-03-10 RX ORDER — ERGOCALCIFEROL 1.25 MG/1
50000 CAPSULE ORAL WEEKLY
COMMUNITY
End: 2022-03-10 | Stop reason: SDUPTHER

## 2022-03-10 RX ORDER — CETIRIZINE HYDROCHLORIDE 10 MG/1
10 TABLET ORAL
Qty: 30 TABLET | Refills: 1 | Status: SHIPPED | OUTPATIENT
Start: 2022-03-10 | End: 2022-03-11

## 2022-03-10 NOTE — PROGRESS NOTES
"Chief Complaint  Cough    Subjective          Medical History: has a past medical history of Acid reflux, Anxiety, Arthritis, Depression, Disease of thyroid gland, Ovarian cyst, Ovarian cyst, Pelvic pain, Pelvic pain in female, and PONV (postoperative nausea and vomiting).     Surgical History: has a past surgical history that includes Appendectomy (1983); Lymph node biopsy (Left, 2001); Cholecystectomy (2012); Laparoscopic tubal ligation (Bilateral, 1994); Abdominoplasty (2016); Knee arthroscopy (Right); Dental surgery (2008); Laparoscopy (Bilateral, 10/17/2016); Colonoscopy (2013); Laparoscopy (Right, 5/7/2018); and Esophagogastroduodenoscopy (2013).     Family History: family history includes Colon cancer in her maternal grandfather.     Social History: reports that she has been smoking cigarettes. She has a 30.00 pack-year smoking history. She has never used smokeless tobacco. She reports that she does not drink alcohol and does not use drugs.    Waleska Shah presents to [facility]  Cough  This is a recurrent problem. The current episode started more than 1 month ago. The problem has been waxing and waning. The problem occurs every few hours. The cough is productive of sputum. Associated symptoms include nasal congestion, rhinorrhea and shortness of breath. Pertinent negatives include no chest pain, chills, sweats or weight loss. Risk factors for lung disease include smoking/tobacco exposure. Treatments tried: Patient has 2 inhalers at home but is not using them. Her past medical history is significant for COPD.       Objective   Vital Signs:   /88 (BP Location: Right arm, Patient Position: Sitting, Cuff Size: Adult)   Pulse 91   Temp 97.4 °F (36.3 °C) (Temporal)   Ht 167.6 cm (66\")   Wt 66.3 kg (146 lb 3.2 oz)   SpO2 99%   BMI 23.60 kg/m²     Physical Exam  Vitals reviewed.   Constitutional:       Appearance: Normal appearance. She is well-developed.   HENT:     "  Head: Normocephalic and atraumatic.      Right Ear: External ear normal.      Left Ear: External ear normal.   Eyes:      Conjunctiva/sclera: Conjunctivae normal.      Pupils: Pupils are equal, round, and reactive to light.   Cardiovascular:      Rate and Rhythm: Normal rate and regular rhythm.      Heart sounds: No murmur heard.  Pulmonary:      Effort: Pulmonary effort is normal.      Breath sounds: Normal breath sounds. No wheezing or rhonchi.   Skin:     General: Skin is warm and dry.   Neurological:      Mental Status: She is alert and oriented to person, place, and time.   Psychiatric:         Mood and Affect: Mood and affect normal.         Behavior: Behavior normal.         Thought Content: Thought content normal.         Judgment: Judgment normal.        Result Review :   The following data was reviewed by: LASHANDA Urban on 03/10/2022:  Common labs    Common Labsle 9/10/21 9/10/21 9/10/21 9/10/21 3/10/22 3/10/22    1416 1416 1416 1416 1615 1615   Glucose    84  94   BUN    11  15   Creatinine    0.88  0.87   eGFR Non African Am    67     Sodium    140  140   Potassium    3.8  4.6   Chloride    101  103   Calcium    10.6 (A)  10.8 (A)   Albumin    5.20  5.10   Total Bilirubin    0.3  0.2   Alkaline Phosphatase    90  89   AST (SGOT)    21  17   ALT (SGPT)    11  11   WBC 6.91    8.15    Hemoglobin 14.1    13.8    Hematocrit 42.3    41.7    Platelets 323    341    Total Cholesterol   268 (A)      Triglycerides   154 (A)      HDL Cholesterol   54      LDL Cholesterol    186 (A)      Hemoglobin A1C  5.20       (A) Abnormal value                       Brief Urine Lab Results  (Last result in the past 365 days)      Color   Clarity   Blood   Leuk Est   Nitrite   Protein   CREAT   Urine HCG        03/10/22 1616 Dark Yellow   Clear   Small   Negative   Negative   Negative                 Current Outpatient Medications on File Prior to Visit   Medication Sig Dispense Refill   •  HYDROcodone-acetaminophen (NORCO) 5-325 MG per tablet Take 1 tablet by mouth Every 6 (Six) Hours As Needed.     • oxyMORphone (OPANA) 5 MG tablet Take 5 mg by mouth As Needed for Moderate Pain .       No current facility-administered medications on file prior to visit.        Assessment and Plan    Diagnoses and all orders for this visit:    1. Cough (Primary)  Comments:  We will treat with Bromfed.  Discussed with patient to call office and let me know which inhaler she has.  We will check labs to rule out any acute infection.  Patient is agreeable treatment plan.  -     CBC & Differential  -     Comprehensive Metabolic Panel    2. Overactive bladder  -     POCT urinalysis dipstick, automated  -     Ambulatory Referral to Obstetrics / Gynecology    3. Stress incontinence in female  Comments:  Patient needs a new referral placed over to her obstetric gynecologist Bisi Donnelly for overactive bladder and stress incontinence.  Orders:  -     POCT urinalysis dipstick, automated  -     Ambulatory Referral to Obstetrics / Gynecology    4. SOB (shortness of breath)  Comments:  Likely related to her smoking.  She is a current smoker, does not feel like that is the issue.  She has not 2 inhalers at home that she does not use.  Patient is supposed to be calling the office to let me know what inhalers she has at home, in which pulmonologist prescribed her those inhalers.  Discussed with patient once I know which pulmonologist prescribed her those inhalers I can get the pulmonologist notes to see what test have been done and how best to help her.  Did discuss smoking sensation, patient is not interested at this time.  We will send over Bromfed to help with the cough, will hold on antibiotic at this time.  Patient is agreeable treatment plan.  Orders:  -     CBC & Differential  -     Comprehensive Metabolic Panel    Other orders  -     vitamin D (ERGOCALCIFEROL) 1.25 MG (51802 UT) capsule capsule; Take 1 capsule by mouth 1  (One) Time Per Week.  Dispense: 13 capsule; Refill: 0  -     Cyanocobalamin (Vitamin B-12) 1000 MCG sublingual tablet; Place 1 tablet under the tongue Daily.  Dispense: 90 tablet; Refill: 0  -     brompheniramine-pseudoephedrine-DM 30-2-10 MG/5ML syrup; Take 5 mL by mouth 4 (Four) Times a Day As Needed for Congestion or Cough.  Dispense: 473 mL; Refill: 1  -     Discontinue: cetirizine (zyrTEC) 10 MG tablet; Take 1 tablet by mouth every night at bedtime.  Dispense: 30 tablet; Refill: 1        Follow Up   No follow-ups on file.  Patient was given instructions and counseling regarding her condition or for health maintenance advice. Please see specific information pulled into the AVS if appropriate.

## 2022-03-11 RX ORDER — CETIRIZINE HYDROCHLORIDE 10 MG/1
TABLET ORAL
Qty: 90 TABLET | Refills: 0 | Status: SHIPPED | OUTPATIENT
Start: 2022-03-11 | End: 2022-12-14

## 2022-03-14 DIAGNOSIS — E83.52 SERUM CALCIUM ELEVATED: Primary | ICD-10-CM

## 2022-03-16 ENCOUNTER — LAB (OUTPATIENT)
Dept: LAB | Facility: HOSPITAL | Age: 57
End: 2022-03-16

## 2022-03-16 DIAGNOSIS — E83.52 SERUM CALCIUM ELEVATED: ICD-10-CM

## 2022-03-16 LAB
CA-I BLDA-SCNC: 1.22 MMOL/L (ref 1.13–1.32)
PTH-INTACT SERPL-MCNC: 63.1 PG/ML (ref 15–65)
VIT B12 BLD-MCNC: 370 PG/ML (ref 211–946)

## 2022-03-16 PROCEDURE — 82330 ASSAY OF CALCIUM: CPT

## 2022-03-16 PROCEDURE — 36415 COLL VENOUS BLD VENIPUNCTURE: CPT | Performed by: NURSE PRACTITIONER

## 2022-03-16 PROCEDURE — 83970 ASSAY OF PARATHORMONE: CPT

## 2022-03-16 PROCEDURE — 82607 VITAMIN B-12: CPT | Performed by: NURSE PRACTITIONER

## 2022-03-21 ENCOUNTER — CLINICAL SUPPORT (OUTPATIENT)
Dept: FAMILY MEDICINE CLINIC | Facility: CLINIC | Age: 57
End: 2022-03-21

## 2022-03-21 DIAGNOSIS — E53.8 VITAMIN B12 DEFICIENCY: Primary | ICD-10-CM

## 2022-03-21 PROCEDURE — 96372 THER/PROPH/DIAG INJ SC/IM: CPT | Performed by: NURSE PRACTITIONER

## 2022-03-21 RX ORDER — CYANOCOBALAMIN 1000 UG/ML
1000 INJECTION, SOLUTION INTRAMUSCULAR; SUBCUTANEOUS
Status: COMPLETED | OUTPATIENT
Start: 2022-03-21 | End: 2022-09-13

## 2022-03-21 RX ADMIN — CYANOCOBALAMIN 1000 MCG: 1000 INJECTION, SOLUTION INTRAMUSCULAR; SUBCUTANEOUS at 14:26

## 2022-04-07 ENCOUNTER — TELEPHONE (OUTPATIENT)
Dept: FAMILY MEDICINE CLINIC | Facility: CLINIC | Age: 57
End: 2022-04-07

## 2022-04-07 NOTE — TELEPHONE ENCOUNTER
ATTEMPTED TO CALL PATIENT AND EXPLAIN THAT WE DO NOT PRINT OFF  REFERRAL LETTERS THAT IT IS A  ISSUE AND TO CALL THEM. PHONE NUMBER IS NOT TAKING CALLS AT THIS TIME.

## 2022-04-22 ENCOUNTER — CLINICAL SUPPORT (OUTPATIENT)
Dept: FAMILY MEDICINE CLINIC | Facility: CLINIC | Age: 57
End: 2022-04-22

## 2022-04-22 DIAGNOSIS — E53.8 B12 DEFICIENCY: ICD-10-CM

## 2022-04-22 PROCEDURE — 96372 THER/PROPH/DIAG INJ SC/IM: CPT | Performed by: NURSE PRACTITIONER

## 2022-04-22 RX ADMIN — CYANOCOBALAMIN 1000 MCG: 1000 INJECTION, SOLUTION INTRAMUSCULAR; SUBCUTANEOUS at 13:19

## 2022-05-17 ENCOUNTER — CLINICAL SUPPORT (OUTPATIENT)
Dept: FAMILY MEDICINE CLINIC | Facility: CLINIC | Age: 57
End: 2022-05-17

## 2022-05-17 DIAGNOSIS — E53.8 VITAMIN B12 DEFICIENCY: ICD-10-CM

## 2022-05-17 PROCEDURE — 96372 THER/PROPH/DIAG INJ SC/IM: CPT | Performed by: NURSE PRACTITIONER

## 2022-05-17 RX ADMIN — CYANOCOBALAMIN 1000 MCG: 1000 INJECTION, SOLUTION INTRAMUSCULAR; SUBCUTANEOUS at 14:43

## 2022-06-06 RX ORDER — ERGOCALCIFEROL 1.25 MG/1
CAPSULE ORAL
Qty: 13 CAPSULE | Refills: 0 | Status: SHIPPED | OUTPATIENT
Start: 2022-06-06 | End: 2023-03-15 | Stop reason: SDUPTHER

## 2022-06-21 ENCOUNTER — CLINICAL SUPPORT (OUTPATIENT)
Dept: FAMILY MEDICINE CLINIC | Facility: CLINIC | Age: 57
End: 2022-06-21

## 2022-06-21 DIAGNOSIS — E53.8 VITAMIN B 12 DEFICIENCY: ICD-10-CM

## 2022-06-21 PROCEDURE — 96372 THER/PROPH/DIAG INJ SC/IM: CPT | Performed by: NURSE PRACTITIONER

## 2022-06-21 RX ADMIN — CYANOCOBALAMIN 1000 MCG: 1000 INJECTION, SOLUTION INTRAMUSCULAR; SUBCUTANEOUS at 10:45

## 2022-07-05 ENCOUNTER — OFFICE VISIT (OUTPATIENT)
Dept: FAMILY MEDICINE CLINIC | Facility: CLINIC | Age: 57
End: 2022-07-05

## 2022-07-05 VITALS
HEIGHT: 66 IN | DIASTOLIC BLOOD PRESSURE: 92 MMHG | TEMPERATURE: 98.1 F | WEIGHT: 145.6 LBS | HEART RATE: 85 BPM | OXYGEN SATURATION: 100 % | BODY MASS INDEX: 23.4 KG/M2 | SYSTOLIC BLOOD PRESSURE: 150 MMHG

## 2022-07-05 DIAGNOSIS — N39.0 URINARY TRACT INFECTION WITH HEMATURIA, SITE UNSPECIFIED: Primary | ICD-10-CM

## 2022-07-05 DIAGNOSIS — Z20.822 CLOSE EXPOSURE TO COVID-19 VIRUS: ICD-10-CM

## 2022-07-05 DIAGNOSIS — R10.9 RIGHT FLANK PAIN: ICD-10-CM

## 2022-07-05 DIAGNOSIS — R31.9 URINARY TRACT INFECTION WITH HEMATURIA, SITE UNSPECIFIED: Primary | ICD-10-CM

## 2022-07-05 DIAGNOSIS — N28.1 RENAL CYST, RIGHT: ICD-10-CM

## 2022-07-05 DIAGNOSIS — R31.9 HEMATURIA, UNSPECIFIED TYPE: ICD-10-CM

## 2022-07-05 LAB
ALBUMIN SERPL-MCNC: 4.8 G/DL (ref 3.5–5.2)
ALBUMIN/GLOB SERPL: 1.7 G/DL
ALP SERPL-CCNC: 90 U/L (ref 39–117)
ALT SERPL W P-5'-P-CCNC: 10 U/L (ref 1–33)
ANION GAP SERPL CALCULATED.3IONS-SCNC: 13 MMOL/L (ref 5–15)
AST SERPL-CCNC: 13 U/L (ref 1–32)
BASOPHILS # BLD AUTO: 0.05 10*3/MM3 (ref 0–0.2)
BASOPHILS NFR BLD AUTO: 0.6 % (ref 0–1.5)
BILIRUB BLD-MCNC: NEGATIVE MG/DL
BILIRUB SERPL-MCNC: <0.2 MG/DL (ref 0–1.2)
BUN SERPL-MCNC: 15 MG/DL (ref 6–20)
BUN/CREAT SERPL: 21.4 (ref 7–25)
CALCIUM SPEC-SCNC: 9.9 MG/DL (ref 8.6–10.5)
CHLORIDE SERPL-SCNC: 103 MMOL/L (ref 98–107)
CLARITY, POC: CLEAR
CO2 SERPL-SCNC: 25 MMOL/L (ref 22–29)
COLOR UR: YELLOW
CREAT SERPL-MCNC: 0.7 MG/DL (ref 0.57–1)
DEPRECATED RDW RBC AUTO: 39.9 FL (ref 37–54)
EGFRCR SERPLBLD CKD-EPI 2021: 102.3 ML/MIN/1.73
EOSINOPHIL # BLD AUTO: 0.09 10*3/MM3 (ref 0–0.4)
EOSINOPHIL NFR BLD AUTO: 1.1 % (ref 0.3–6.2)
ERYTHROCYTE [DISTWIDTH] IN BLOOD BY AUTOMATED COUNT: 12.4 % (ref 12.3–15.4)
GLOBULIN UR ELPH-MCNC: 2.8 GM/DL
GLUCOSE SERPL-MCNC: 85 MG/DL (ref 65–99)
GLUCOSE UR STRIP-MCNC: NEGATIVE MG/DL
HCT VFR BLD AUTO: 40.1 % (ref 34–46.6)
HGB BLD-MCNC: 13.9 G/DL (ref 12–15.9)
IMM GRANULOCYTES # BLD AUTO: 0.02 10*3/MM3 (ref 0–0.05)
IMM GRANULOCYTES NFR BLD AUTO: 0.2 % (ref 0–0.5)
KETONES UR QL: ABNORMAL
LEUKOCYTE EST, POC: NEGATIVE
LYMPHOCYTES # BLD AUTO: 3.51 10*3/MM3 (ref 0.7–3.1)
LYMPHOCYTES NFR BLD AUTO: 42.7 % (ref 19.6–45.3)
MCH RBC QN AUTO: 31.4 PG (ref 26.6–33)
MCHC RBC AUTO-ENTMCNC: 34.7 G/DL (ref 31.5–35.7)
MCV RBC AUTO: 90.5 FL (ref 79–97)
MONOCYTES # BLD AUTO: 0.52 10*3/MM3 (ref 0.1–0.9)
MONOCYTES NFR BLD AUTO: 6.3 % (ref 5–12)
NEUTROPHILS NFR BLD AUTO: 4.03 10*3/MM3 (ref 1.7–7)
NEUTROPHILS NFR BLD AUTO: 49.1 % (ref 42.7–76)
NITRITE UR-MCNC: NEGATIVE MG/ML
NRBC BLD AUTO-RTO: 0 /100 WBC (ref 0–0.2)
PH UR: 5.5 [PH] (ref 5–8)
PLATELET # BLD AUTO: 342 10*3/MM3 (ref 140–450)
PMV BLD AUTO: 9.7 FL (ref 6–12)
POTASSIUM SERPL-SCNC: 3.9 MMOL/L (ref 3.5–5.2)
PROT SERPL-MCNC: 7.6 G/DL (ref 6–8.5)
PROT UR STRIP-MCNC: NEGATIVE MG/DL
RBC # BLD AUTO: 4.43 10*6/MM3 (ref 3.77–5.28)
RBC # UR STRIP: ABNORMAL /UL
SODIUM SERPL-SCNC: 141 MMOL/L (ref 136–145)
SP GR UR: 1.03 (ref 1–1.03)
UROBILINOGEN UR QL: NORMAL
WBC NRBC COR # BLD: 8.22 10*3/MM3 (ref 3.4–10.8)

## 2022-07-05 PROCEDURE — 99213 OFFICE O/P EST LOW 20 MIN: CPT | Performed by: FAMILY MEDICINE

## 2022-07-05 PROCEDURE — 81002 URINALYSIS NONAUTO W/O SCOPE: CPT | Performed by: FAMILY MEDICINE

## 2022-07-05 PROCEDURE — 87086 URINE CULTURE/COLONY COUNT: CPT | Performed by: FAMILY MEDICINE

## 2022-07-05 PROCEDURE — U0004 COV-19 TEST NON-CDC HGH THRU: HCPCS | Performed by: FAMILY MEDICINE

## 2022-07-05 PROCEDURE — 85025 COMPLETE CBC W/AUTO DIFF WBC: CPT | Performed by: FAMILY MEDICINE

## 2022-07-05 PROCEDURE — 80053 COMPREHEN METABOLIC PANEL: CPT | Performed by: FAMILY MEDICINE

## 2022-07-05 RX ORDER — CIPROFLOXACIN 500 MG/1
500 TABLET, FILM COATED ORAL 2 TIMES DAILY
Qty: 14 TABLET | Refills: 0 | Status: SHIPPED | OUTPATIENT
Start: 2022-07-05 | End: 2022-07-12

## 2022-07-05 RX ORDER — AMOXICILLIN 500 MG/1
1000 CAPSULE ORAL 2 TIMES DAILY
COMMUNITY
Start: 2022-04-22 | End: 2022-08-12

## 2022-07-05 RX ORDER — CLINDAMYCIN PHOSPHATE 10 UG/ML
LOTION TOPICAL
COMMUNITY
Start: 2022-04-22 | End: 2022-08-12

## 2022-07-05 RX ORDER — SPIRONOLACTONE 50 MG/1
50 TABLET, FILM COATED ORAL DAILY
COMMUNITY
Start: 2022-04-22 | End: 2022-08-12

## 2022-07-05 NOTE — PROGRESS NOTES
Chief Complaint  Chief Complaint   Patient presents with   • Back Pain   • Hip Pain       HPI:  Waleska Shah presents to [facility]     Abdominal pain-patient has right-sided lower quadrant and flank pain.  Patient reports that she keeps getting this recurrent abdominal pain and has had.  Abdominal MRI which showed distended bladder.  Patient is also being seen by OB/GYN and was at one point because she had a right ovarian cyst.  Patient was also evaluated and saw that she had a right renal cyst.  Patient also has had a history of urinary tract infection so we will be rechecking a urinalysis today.    Close exposure to COVID at work.  Patient reports that she has had multiple contacts with people at her job that have been positive for COVID patient was to get checked for COVID.      Past History:  Medical History: has a past medical history of Acid reflux, Anxiety, Arthritis, Depression, Disease of thyroid gland, Ovarian cyst, Ovarian cyst, Pelvic pain, Pelvic pain in female, and PONV (postoperative nausea and vomiting).   Surgical History: has a past surgical history that includes Appendectomy (1983); Lymph node biopsy (Left, 2001); Cholecystectomy (2012); Laparoscopic tubal ligation (Bilateral, 1994); Abdominoplasty (2016); Knee arthroscopy (Right); Dental surgery (2008); Laparoscopy (Bilateral, 10/17/2016); Colonoscopy (2013); Laparoscopy (Right, 5/7/2018); and Esophagogastroduodenoscopy (2013).   Family History: family history includes Colon cancer in her maternal grandfather.   Social History: reports that she has been smoking cigarettes. She has a 30.00 pack-year smoking history. She has never used smokeless tobacco. She reports that she does not drink alcohol and does not use drugs.  Immunization History   Administered Date(s) Administered   • COVID-19 (PFIZER) PURPLE CAP 05/18/2021         Allergies: Amoxicillin, Hydrocodone, Ibuprofen, Percocet  "[oxycodone-acetaminophen], and Tylenol with codeine #3 [acetaminophen-codeine]     Medications:  Current Outpatient Medications on File Prior to Visit   Medication Sig Dispense Refill   • brompheniramine-pseudoephedrine-DM 30-2-10 MG/5ML syrup Take 5 mL by mouth 4 (Four) Times a Day As Needed for Congestion or Cough. 473 mL 1   • clindamycin (CLEOCIN T) 1 % lotion APPLY TO THE AFFECTED AREA(S) EVERY DAY     • Cyanocobalamin (Vitamin B-12) 1000 MCG sublingual tablet Place 1 tablet under the tongue Daily. 90 tablet 0   • spironolactone (ALDACTONE) 50 MG tablet Take 50 mg by mouth Daily.     • vitamin D (ERGOCALCIFEROL) 1.25 MG (90850 UT) capsule capsule TAKE 1 CAPSULE BY MOUTH 1 TIME EVERY WEEK 13 capsule 0   • amoxicillin (AMOXIL) 500 MG capsule Take 1,000 mg by mouth 2 (Two) Times a Day.     • cetirizine (zyrTEC) 10 MG tablet TAKE 1 TABLET BY MOUTH AT BEDTIME 90 tablet 0   • HYDROcodone-acetaminophen (NORCO) 5-325 MG per tablet Take 1 tablet by mouth Every 6 (Six) Hours As Needed.     • oxyMORphone (OPANA) 5 MG tablet Take 5 mg by mouth As Needed for Moderate Pain .       Current Facility-Administered Medications on File Prior to Visit   Medication Dose Route Frequency Provider Last Rate Last Admin   • cyanocobalamin injection 1,000 mcg  1,000 mcg Intramuscular Q30 Days Erick Sandhu APRN   1,000 mcg at 07/25/22 1021        Health Maintenance Due   Topic Date Due   • Pneumococcal Vaccine 0-64 (1 - PCV) Never done   • TDAP/TD VACCINES (1 - Tdap) Never done   • ZOSTER VACCINE (1 of 2) Never done   • HEPATITIS C SCREENING  Never done   • PAP SMEAR  Never done   • MAMMOGRAM  05/04/2019   • COLORECTAL CANCER SCREENING  09/11/2021   • COVID-19 Vaccine (3 - Booster) 11/03/2021       Vital Signs:   Vitals:    07/05/22 1627   BP: 150/92   Pulse: 85   Temp: 98.1 °F (36.7 °C)   SpO2: 100%   Weight: 66 kg (145 lb 9.6 oz)   Height: 167.6 cm (66\")      Body mass index is 23.5 kg/m².     ROS:  Review of Systems "   Constitutional: Negative for fatigue and fever.   HENT: Negative for congestion, ear pain and sinus pressure.    Respiratory: Negative for cough, chest tightness and shortness of breath.    Cardiovascular: Negative for chest pain, palpitations and leg swelling.   Gastrointestinal: Negative for abdominal pain and diarrhea.   Genitourinary: Negative for dysuria and frequency.   Neurological: Negative for speech difficulty, headache and confusion.   Psychiatric/Behavioral: Negative for agitation and behavioral problems.          Physical Exam  Vitals reviewed.   Constitutional:       Appearance: Normal appearance.   HENT:      Right Ear: Tympanic membrane normal.      Left Ear: Tympanic membrane normal.      Nose: Nose normal.   Eyes:      Extraocular Movements: Extraocular movements intact.      Conjunctiva/sclera: Conjunctivae normal.      Pupils: Pupils are equal, round, and reactive to light.   Cardiovascular:      Rate and Rhythm: Normal rate and regular rhythm.   Pulmonary:      Effort: Pulmonary effort is normal.      Breath sounds: Normal breath sounds.   Abdominal:      General: Bowel sounds are normal.      Tenderness: There is abdominal tenderness. There is right CVA tenderness.   Musculoskeletal:         General: Normal range of motion.      Cervical back: Normal range of motion.   Skin:     General: Skin is warm and dry.   Neurological:      General: No focal deficit present.      Mental Status: She is alert and oriented to person, place, and time.   Psychiatric:         Mood and Affect: Mood normal.         Behavior: Behavior normal.          Result Review   Comprehensive Metabolic Panel (03/10/2022 16:15)       Diagnoses and all orders for this visit:    1. Urinary tract infection with hematuria, site unspecified (Primary)  -     ciprofloxacin (Cipro) 500 MG tablet; Take 1 tablet by mouth 2 (Two) Times a Day for 7 days.  Dispense: 14 tablet; Refill: 0    2. Right flank pain  -     POCT urinalysis  dipstick, manual  -     CBC Auto Differential  -     Comprehensive Metabolic Panel  -     Ambulatory Referral to Urology    3. Close exposure to COVID-19 virus  -     COVID-19,APTIMA PANTHER(MARY),[facility], NP/OP SWAB IN UTM/VTM/[facility] [employer] MEDIA,24 HR TAT - Swab, Nasopharynx    4. Hematuria, unspecified type  -     Urine Culture - Urine, Urine, Clean Catch  -     Ambulatory Referral to Urology    5. Renal cyst, right  -     Ambulatory Referral to Urology          Smoking Cessation:    Waleska Shah  reports that she has been smoking cigarettes. She has a 30.00 pack-year smoking history. She has never used smokeless tobacco.          Follow Up   Return if symptoms worsen or fail to improve.  Patient was given instructions and counseling regarding her condition or for health maintenance advice. Please see specific information pulled into the AVS if appropriate.       Funmilayo Dickerson MD

## 2022-07-06 LAB
BACTERIA SPEC AEROBE CULT: NO GROWTH
SARS-COV-2 RNA PNL SPEC NAA+PROBE: NOT DETECTED

## 2022-07-25 ENCOUNTER — CLINICAL SUPPORT (OUTPATIENT)
Dept: FAMILY MEDICINE CLINIC | Facility: CLINIC | Age: 57
End: 2022-07-25

## 2022-07-25 DIAGNOSIS — E53.8 VITAMIN B12 DEFICIENCY: ICD-10-CM

## 2022-07-25 PROCEDURE — 96372 THER/PROPH/DIAG INJ SC/IM: CPT | Performed by: NURSE PRACTITIONER

## 2022-07-25 RX ADMIN — CYANOCOBALAMIN 1000 MCG: 1000 INJECTION, SOLUTION INTRAMUSCULAR; SUBCUTANEOUS at 10:21

## 2022-08-12 ENCOUNTER — OFFICE VISIT (OUTPATIENT)
Dept: UROLOGY | Facility: CLINIC | Age: 57
End: 2022-08-12

## 2022-08-12 VITALS — WEIGHT: 142.2 LBS | HEIGHT: 66 IN | BODY MASS INDEX: 22.85 KG/M2

## 2022-08-12 DIAGNOSIS — R31.29 MICROHEMATURIA: ICD-10-CM

## 2022-08-12 DIAGNOSIS — N83.201 RIGHT OVARIAN CYST: Primary | ICD-10-CM

## 2022-08-12 DIAGNOSIS — N28.1 RENAL CYST: ICD-10-CM

## 2022-08-12 LAB
BILIRUB BLD-MCNC: NEGATIVE MG/DL
CLARITY, POC: CLEAR
COLOR UR: YELLOW
EXPIRATION DATE: 823
GLUCOSE UR STRIP-MCNC: NEGATIVE MG/DL
KETONES UR QL: NEGATIVE
LEUKOCYTE EST, POC: NEGATIVE
Lab: ABNORMAL
NITRITE UR-MCNC: NEGATIVE MG/ML
PH UR: 6 [PH] (ref 5–8)
PROT UR STRIP-MCNC: NEGATIVE MG/DL
RBC # UR STRIP: ABNORMAL /UL
SP GR UR: 1.01 (ref 1–1.03)
UROBILINOGEN UR QL: NORMAL

## 2022-08-12 PROCEDURE — 81003 URINALYSIS AUTO W/O SCOPE: CPT | Performed by: UROLOGY

## 2022-08-12 PROCEDURE — 99204 OFFICE O/P NEW MOD 45 MIN: CPT | Performed by: UROLOGY

## 2022-08-12 RX ORDER — ESTRADIOL 0.1 MG/G
CREAM VAGINAL WEEKLY
COMMUNITY
Start: 2022-08-08

## 2022-08-12 RX ORDER — TRAMADOL HYDROCHLORIDE 50 MG/1
50 TABLET ORAL EVERY 6 HOURS PRN
COMMUNITY
End: 2022-10-20 | Stop reason: SDUPTHER

## 2022-08-12 NOTE — PROGRESS NOTES
"Chief Complaint  No chief complaint on file.    Subjective          Waleska Shah presents to [facility]  History of Present Illness    The patient is referred to me for a renal cyst. She had a CT scan done 09/22/2021 that showed a complex cyst, proteinaceous or hemorrhagic cyst. She had a follow-up ultrasound after that that showed a simple right lower pole cyst. She has also had a history of microscopic hematuria and has not had a work-up for that.    The patient reports that she has had blood in her urine for a while. She states that her right side is constantly tight and hard. She reports she feels the inflammation inside her stomach and she is constantly bloated. She states that she has had a sharp pain 3 times. She reports that she has a lot on and off. She states she has an issue holding her water because she is leaking.      Objective   Vital Signs:   Ht 167.6 cm (66\")   Wt 64.5 kg (142 lb 3.2 oz)   BMI 22.95 kg/m²       Physical Exam  Vitals and nursing note reviewed.   Constitutional:       Appearance: Normal appearance. She is well-developed.   Pulmonary:      Effort: Pulmonary effort is normal.      Breath sounds: Normal air entry.   Neurological:      Mental Status: She is alert and oriented to person, place, and time.      Motor: Motor function is intact.   Psychiatric:         Mood and Affect: Mood normal.         Behavior: Behavior normal.          Result Review :   The following data was reviewed by: Lara Bullock MD on 08/12/2022:    Results for orders placed or performed in visit on 08/12/22   POC Urinalysis Dipstick, Automated    Specimen: Urine   Result Value Ref Range    Color Yellow Yellow, Straw, Dark Yellow, Cristina    Clarity, UA Clear Clear    Specific Gravity  1.015 1.005 - 1.030    pH, Urine 6.0 5.0 - 8.0    Leukocytes Negative Negative    Nitrite, UA Negative Negative    Protein, POC Negative Negative mg/dL    Glucose, UA Negative Negative mg/dL    " Ketones, UA Negative Negative    Urobilinogen, UA Normal Normal    Bilirubin Negative Negative    Blood, UA Small (A) Negative    Lot Number [id number]     Expiration Date 823          Data reviewed: Radiologic studies CT scan:     Study Result    Narrative & Impression   PROCEDURE:  CT ABDOMEN PELVIS W WO CONTRAST     COMPARISON:  None  INDICATIONS:  Abdominal pain, acute, nonlocalized     TECHNIQUE:    After obtaining the patient's consent, CT images of the abdomen were created without and   with non-ionic intravenous contrast material, and CT images of the pelvis were obtained with   non-ionic intravenous contrast material.       PROTOCOL:     Urology imaging protocol performed                 RADIATION:      DLP: 1047.7mGy*cm               Automated exposure control was utilized to minimize radiation dose.   CONTRAST:      100cc Isovue 370 I.V.  LABS:   eGFR: >60ml/min/1.73m2     FINDINGS:          Abdomen without contrast:  Included lung bases are clear.     Previous cholecystectomy.  No radiodense renal calculus.  No radiodense ureteral calculus.     Pelvis without contrast:  No radiodense bladder calculus.     Abdomen with contrast:  The kidneys enhance symmetrically.  1.6 cm mildly proteinaceous or   hemorrhagic cyst lower pole right kidney.  Adrenal glands and pancreas are unremarkable.  No liver   or splenic lesion.  Bowel loops are nondilated.  Moderate colonic stool burden.     Pelvis with contrast:  No pelvic mass or fluid.  No aggressive appearing bone change.     CONCLUSION: No acute findings.     Moderate colonic stool burden.            LENORE TARANGO MD         Electronically Signed and Approved By: LENORE TARANGO MD on 9/22/2021 at 16:04          Study Result    Narrative & Impression   PROCEDURE:  US RENAL BILATERAL     COMPARISON: None     INDICATIONS:  cyst right kidney     FINDINGS:          The right kidney measures 9.4 x 4.4 x 4.7 cm.  There is a simple right lower pole renal cyst.  The   left  kidney measures 10.5 x 4.8 x 4.1 cm.  No evidence of hydronephrosis is identified bilaterally.    The bladder is unremarkable.     CONCLUSION:   1. Simple right lower pole renal cyst measuring 1.6 cm.                CHARLES RAND MD         Electronically Signed and Approved By: CHARLES RAND MD on 10/07/2021 at 14:14                 Assessment and Plan    Diagnoses and all orders for this visit:    1. Right ovarian cyst (Primary)    2. Renal cyst  -     POC Urinalysis Dipstick, Automated  -     CT Abdomen Pelvis With & Without Contrast; Future  -     Cystoscopy; Future    3. Microhematuria  -     CT Abdomen Pelvis With & Without Contrast; Future  -     Cystoscopy; Future      Renal cyst  - We will get a CT scan of her abdomen and pelvis to evaluate the cyst and work-up for hematuria and then a cystoscopy she will have done in the office to complete that work-up. I will see her back then.      Follow Up       No follow-ups on file.  Patient was given instructions and counseling regarding her condition or for health maintenance advice. Please see specific information pulled into the AVS if appropriate.     Transcribed from ambient dictation for Lara Bullock MD by Martha Albert.  08/12/22   12:32 EDT    Patient verbalized consent to the visit recording.  I have personally performed the services described in this document as transcribed by the above individual, and it is both accurate and complete.  Lara Bullock MD  8/12/2022  14:24 EDT

## 2022-09-13 ENCOUNTER — CLINICAL SUPPORT (OUTPATIENT)
Dept: FAMILY MEDICINE CLINIC | Facility: CLINIC | Age: 57
End: 2022-09-13

## 2022-09-13 DIAGNOSIS — E53.8 VITAMIN B12 DEFICIENCY: ICD-10-CM

## 2022-09-13 PROCEDURE — 96372 THER/PROPH/DIAG INJ SC/IM: CPT | Performed by: NURSE PRACTITIONER

## 2022-09-13 RX ADMIN — CYANOCOBALAMIN 1000 MCG: 1000 INJECTION, SOLUTION INTRAMUSCULAR; SUBCUTANEOUS at 14:32

## 2022-09-16 ENCOUNTER — HOSPITAL ENCOUNTER (OUTPATIENT)
Dept: CT IMAGING | Facility: HOSPITAL | Age: 57
Discharge: HOME | End: 2022-09-16
Admitting: UROLOGY

## 2022-09-16 DIAGNOSIS — N28.1 RENAL CYST: ICD-10-CM

## 2022-09-16 DIAGNOSIS — R31.29 MICROHEMATURIA: ICD-10-CM

## 2022-09-16 PROCEDURE — 74177 CT ABD & PELVIS W/CONTRAST: CPT

## 2022-09-16 PROCEDURE — 74178 CT ABD&PLV WO CNTR FLWD CNTR: CPT

## 2022-09-16 PROCEDURE — 0 IOPAMIDOL PER 1 ML: Performed by: UROLOGY

## 2022-09-16 RX ADMIN — IOPAMIDOL 100 ML: 755 INJECTION, SOLUTION INTRAVENOUS at 16:27

## 2022-09-19 ENCOUNTER — OFFICE VISIT (OUTPATIENT)
Dept: UROLOGY | Facility: CLINIC | Age: 57
End: 2022-09-19

## 2022-09-19 VITALS — HEIGHT: 66 IN | RESPIRATION RATE: 16 BRPM | WEIGHT: 144.2 LBS | BODY MASS INDEX: 23.18 KG/M2

## 2022-09-19 DIAGNOSIS — R31.29 MICROHEMATURIA: Primary | ICD-10-CM

## 2022-09-19 DIAGNOSIS — N28.1 RENAL CYST: ICD-10-CM

## 2022-09-19 LAB
BILIRUB BLD-MCNC: NEGATIVE MG/DL
CLARITY, POC: CLEAR
COLOR UR: YELLOW
EXPIRATION DATE: ABNORMAL
GLUCOSE UR STRIP-MCNC: NEGATIVE MG/DL
KETONES UR QL: NEGATIVE
LEUKOCYTE EST, POC: NEGATIVE
Lab: ABNORMAL
NITRITE UR-MCNC: NEGATIVE MG/ML
PH UR: 5.5 [PH] (ref 5–8)
PROT UR STRIP-MCNC: NEGATIVE MG/DL
RBC # UR STRIP: ABNORMAL /UL
SP GR UR: 1.01 (ref 1–1.03)
UROBILINOGEN UR QL: ABNORMAL

## 2022-09-19 PROCEDURE — 0 IOPAMIDOL PER 1 ML: Performed by: UROLOGY

## 2022-09-19 PROCEDURE — 81003 URINALYSIS AUTO W/O SCOPE: CPT | Performed by: UROLOGY

## 2022-09-19 PROCEDURE — 99213 OFFICE O/P EST LOW 20 MIN: CPT | Performed by: UROLOGY

## 2022-09-19 RX ADMIN — IOPAMIDOL 100 ML: 755 INJECTION, SOLUTION INTRAVENOUS at 14:02

## 2022-09-19 NOTE — PROGRESS NOTES
"Chief Complaint  Renal cyst    Subjective          Waleska Shah presents to [facility] UROLOG[facility]  History of Present Illness    The patient presents today for a renal cyst.    The patient reports she has been having diarrhea and her \"butt is so red and hurts\". She states she had a CT scan on 09/18/2022. She reports she was given a medication by her primary care provider for the same pain and blood in her urine for 7 days. She states she feels very uncomfortable sitting down, with her legs open, and she has a hard time holding her water.    She reports she has an \"ingrown ovary\".      Objective   Vital Signs:   Resp 16   Ht 167.6 cm (66\")   Wt 65.4 kg (144 lb 3.2 oz)   BMI 23.27 kg/m²     Physical Exam  Vitals and nursing note reviewed.   Constitutional:       Appearance: Normal appearance. She is well-developed.   Pulmonary:      Effort: Pulmonary effort is normal.      Breath sounds: Normal air entry.   Neurological:      Mental Status: She is alert and oriented to person, place, and time.      Motor: Motor function is intact.   Psychiatric:         Mood and Affect: Mood normal.         Behavior: Behavior normal.          Result Review :   The following data was reviewed by: Lara Bullock MD on 09/19/2022:    Results for orders placed or performed in visit on 09/19/22   POC Urinalysis Dipstick, Automated    Specimen: Urine   Result Value Ref Range    Color Yellow Yellow, Straw, Dark Yellow, Cristina    Clarity, UA Clear Clear    Specific Gravity  1.010 1.005 - 1.030    pH, Urine 5.5 5.0 - 8.0    Leukocytes Negative Negative    Nitrite, UA Negative Negative    Protein, POC Negative Negative mg/dL    Glucose, UA Negative Negative mg/dL    Ketones, UA Negative Negative    Urobilinogen, UA 0.2 E.U./dL Normal, 0.2 E.U./dL    Bilirubin Negative Negative    Blood, UA Small (A) Negative    Lot Number [id number]     Expiration Date 82,023                        Assessment and Plan    Diagnoses and all " orders for this visit:    1. Microhematuria (Primary)  -     Cystoscopy  -     POC Urinalysis Dipstick, Automated    2. Renal cyst  -     Cystoscopy    Hematuria  We will get her rescheduled for a cystoscopy. She is agreeable to have it at this time. I did review her CT scan today, which revealed a simple cyst, but no other abnormalities of the kidneys.          Follow Up       No follow-ups on file.  Patient was given instructions and counseling regarding her condition or for health maintenance advice. Please see specific information pulled into the AVS if appropriate.     Transcribed from ambient dictation for Lara Bullock MD by Martha Albert.  09/19/22   15:23 EDT    Patient verbalized consent to the visit recording.  I have personally performed the services described in this document as transcribed by the above individual, and it is both accurate and complete.  Lara Bullock MD  9/20/2022  07:41 EDT

## 2022-10-20 ENCOUNTER — OFFICE VISIT (OUTPATIENT)
Dept: FAMILY MEDICINE CLINIC | Facility: CLINIC | Age: 57
End: 2022-10-20

## 2022-10-20 VITALS
TEMPERATURE: 97.4 F | OXYGEN SATURATION: 99 % | BODY MASS INDEX: 23.72 KG/M2 | SYSTOLIC BLOOD PRESSURE: 146 MMHG | HEART RATE: 70 BPM | HEIGHT: 66 IN | WEIGHT: 147.6 LBS | DIASTOLIC BLOOD PRESSURE: 88 MMHG

## 2022-10-20 DIAGNOSIS — Z12.11 COLON CANCER SCREENING: ICD-10-CM

## 2022-10-20 DIAGNOSIS — Z11.59 ENCOUNTER FOR HEPATITIS C SCREENING TEST FOR LOW RISK PATIENT: ICD-10-CM

## 2022-10-20 DIAGNOSIS — E53.8 VITAMIN B12 DEFICIENCY: ICD-10-CM

## 2022-10-20 DIAGNOSIS — R39.9 URINARY SYMPTOM OR SIGN: ICD-10-CM

## 2022-10-20 DIAGNOSIS — Z72.0 TOBACCO ABUSE: ICD-10-CM

## 2022-10-20 DIAGNOSIS — Z13.220 LIPID SCREENING: ICD-10-CM

## 2022-10-20 DIAGNOSIS — R10.84 GENERALIZED ABDOMINAL PAIN: ICD-10-CM

## 2022-10-20 DIAGNOSIS — Z00.00 ANNUAL PHYSICAL EXAM: Primary | ICD-10-CM

## 2022-10-20 DIAGNOSIS — E55.9 VITAMIN D DEFICIENCY: ICD-10-CM

## 2022-10-20 LAB
AMPHET+METHAMPHET UR QL: NEGATIVE
AMPHETAMINE INTERNAL CONTROL: NORMAL
AMPHETAMINES UR QL: NEGATIVE
BARBITURATE INTERNAL CONTROL: NORMAL
BARBITURATES UR QL SCN: NEGATIVE
BENZODIAZ UR QL SCN: NEGATIVE
BENZODIAZEPINE INTERNAL CONTROL: NORMAL
BILIRUB BLD-MCNC: NEGATIVE MG/DL
BUPRENORPHINE INTERNAL CONTROL: NORMAL
BUPRENORPHINE SERPL-MCNC: NEGATIVE NG/ML
CANNABINOIDS SERPL QL: NEGATIVE
CLARITY, POC: ABNORMAL
COCAINE INTERNAL CONTROL: NORMAL
COCAINE UR QL: NEGATIVE
COLOR UR: YELLOW
EXPIRATION DATE: ABNORMAL
EXPIRATION DATE: NORMAL
FOLATE SERPL-MCNC: 12.9 NG/ML (ref 4.78–24.2)
GLUCOSE UR STRIP-MCNC: NEGATIVE MG/DL
KETONES UR QL: NEGATIVE
LEUKOCYTE EST, POC: NEGATIVE
Lab: ABNORMAL
Lab: NORMAL
MDMA (ECSTASY) INTERNAL CONTROL: NORMAL
MDMA UR QL SCN: NEGATIVE
METHADONE INTERNAL CONTROL: NORMAL
METHADONE UR QL SCN: NEGATIVE
METHAMPHETAMINE INTERNAL CONTROL: NORMAL
NITRITE UR-MCNC: NEGATIVE MG/ML
OPIATES INTERNAL CONTROL: NORMAL
OPIATES UR QL: NEGATIVE
OXYCODONE INTERNAL CONTROL: NORMAL
OXYCODONE UR QL SCN: NEGATIVE
PCP UR QL SCN: NEGATIVE
PH UR: 6 [PH] (ref 5–8)
PHENCYCLIDINE INTERNAL CONTROL: NORMAL
PROT UR STRIP-MCNC: NEGATIVE MG/DL
RBC # UR STRIP: ABNORMAL /UL
SP GR UR: 1.01 (ref 1–1.03)
THC INTERNAL CONTROL: NORMAL
UROBILINOGEN UR QL: NORMAL
VIT B12 BLD-MCNC: 636 PG/ML (ref 211–946)

## 2022-10-20 PROCEDURE — 80053 COMPREHEN METABOLIC PANEL: CPT | Performed by: NURSE PRACTITIONER

## 2022-10-20 PROCEDURE — 99213 OFFICE O/P EST LOW 20 MIN: CPT | Performed by: NURSE PRACTITIONER

## 2022-10-20 PROCEDURE — 86803 HEPATITIS C AB TEST: CPT | Performed by: NURSE PRACTITIONER

## 2022-10-20 PROCEDURE — 80305 DRUG TEST PRSMV DIR OPT OBS: CPT | Performed by: NURSE PRACTITIONER

## 2022-10-20 PROCEDURE — 82746 ASSAY OF FOLIC ACID SERUM: CPT | Performed by: NURSE PRACTITIONER

## 2022-10-20 PROCEDURE — 82607 VITAMIN B-12: CPT | Performed by: NURSE PRACTITIONER

## 2022-10-20 PROCEDURE — 82306 VITAMIN D 25 HYDROXY: CPT | Performed by: NURSE PRACTITIONER

## 2022-10-20 PROCEDURE — 81003 URINALYSIS AUTO W/O SCOPE: CPT | Performed by: NURSE PRACTITIONER

## 2022-10-20 PROCEDURE — 99396 PREV VISIT EST AGE 40-64: CPT | Performed by: NURSE PRACTITIONER

## 2022-10-20 PROCEDURE — 80061 LIPID PANEL: CPT | Performed by: NURSE PRACTITIONER

## 2022-10-20 PROCEDURE — 85025 COMPLETE CBC W/AUTO DIFF WBC: CPT | Performed by: NURSE PRACTITIONER

## 2022-10-20 RX ORDER — PHENAZOPYRIDINE HYDROCHLORIDE 100 MG/1
100 TABLET, FILM COATED ORAL DAILY PRN
Qty: 15 TABLET | Refills: 0 | Status: SHIPPED | OUTPATIENT
Start: 2022-10-20 | End: 2022-12-14

## 2022-10-20 RX ORDER — CIPROFLOXACIN 500 MG/1
500 TABLET, FILM COATED ORAL 2 TIMES DAILY
Qty: 6 TABLET | Refills: 0 | Status: SHIPPED | OUTPATIENT
Start: 2022-10-20 | End: 2022-10-23

## 2022-10-20 RX ORDER — TRAMADOL HYDROCHLORIDE 50 MG/1
50 TABLET ORAL EVERY 6 HOURS PRN
Qty: 30 TABLET | Refills: 1 | Status: SHIPPED | OUTPATIENT
Start: 2022-10-20 | End: 2023-01-06 | Stop reason: SDUPTHER

## 2022-10-20 NOTE — PROGRESS NOTES
Chief Complaint  Abdominal Pain (Side pain/ tightness ) and Annual Exam    Subjective          Medical History: has a past medical history of Acid reflux, Anxiety, Arthritis, Depression, Disease of thyroid gland, Ovarian cyst, Ovarian cyst, Pelvic pain, Pelvic pain in female, and PONV (postoperative nausea and vomiting).     Surgical History: has a past surgical history that includes Appendectomy (1983); Lymph node biopsy (Left, 2001); Cholecystectomy (2012); Laparoscopic tubal ligation (Bilateral, 1994); Abdominoplasty (2016); Knee arthroscopy (Right); Dental surgery (2008); Laparoscopy (Bilateral, 10/17/2016); Colonoscopy (2013); Laparoscopy (Right, 5/7/2018); and Esophagogastroduodenoscopy (2013).     Family History: family history includes Colon cancer in her maternal grandfather.     Social History: reports that she has been smoking cigarettes. She has a 30.00 pack-year smoking history. She has never used smokeless tobacco. She reports that she does not drink alcohol and does not use drugs.    Waleska Shah presents to [facility]  History of Present Illness    ENCOUNTER DATE:  10/20/2022    Waleska Shah / 57 y.o. / female      CHIEF COMPLAINT    No chief complaint on file.      VITALS    There were no vitals taken for this visit.    BP Readings from Last 3 Encounters:  07/05/22 : 150/92  03/10/22 : 142/88  01/21/22 : 130/88    Wt Readings from Last 3 Encounters:  09/19/22 : 65.4 kg (144 lb 3.2 oz)  08/12/22 : 64.5 kg (142 lb 3.2 oz)  07/05/22 : 66 kg (145 lb 9.6 oz)    There is no height or weight on file to calculate BMI.    MEDICATIONS    Current Outpatient Medications on File Prior to Visit:  cetirizine (zyrTEC) 10 MG tablet, TAKE 1 TABLET BY MOUTH AT BEDTIME, Disp: 90 tablet, Rfl: 0  Cyanocobalamin (Vitamin B-12) 1000 MCG sublingual tablet, Place 1 tablet under the tongue Daily., Disp: 90 tablet, Rfl: 0  estradiol (ESTRACE) 0.1 MG/GM vaginal cream, inset 0.5 grams  "into THE VAGINA EVERY NIGHT AT BEDTIME for TWO WEEKS, THEN USE 0.5 grams TWICE A WEEK., Disp: , Rfl:   traMADol (ULTRAM) 50 MG tablet, Take 50 mg by mouth Every 6 (Six) Hours As Needed for Moderate Pain  (1/2 tab)., Disp: , Rfl:   vitamin D (ERGOCALCIFEROL) 1.25 MG (86069 UT) capsule capsule, TAKE 1 CAPSULE BY MOUTH 1 TIME EVERY WEEK, Disp: 13 capsule, Rfl: 0    No current facility-administered medications on file prior to visit.        HISTORY OF PRESENT ILLNESS    Waleska presents for annual health maintenance visit.    Last health maintenance visit: approximately 1 year ago  General health: some medical problems  Lifestyle:  Attempting to lose weight?: No   Diet: eats a well balanced, healthy diet  Exercise: walks regularly  Tobacco: Regular use (~1 pack/day)   Alcohol: does not drink  Work: Full-time  Reproductive health:  Terri/Postmenopausal?: Yes   Domestic abuse concerns: No   Depression Screening:     PHQ-2: 0 (Not depressed)  PHQ-9: 0 (Negative screening for depression)    Patient Care Team:  Erick Sandhu APRN as PCP - General (Nurse Practitioner)  Lara Bullock MD as Consulting Physician (Urology)      ALLERGIES  -- Amoxicillin -- Itching  -- Hydrocodone -- GI Intolerance   --  PT STATES SHE IS BOTHERED ONLY BY \"GENERIC\"            HYDROCODONE.  ALSO GETS A HEADACHE AND BLURRED            VISION WITH \"GENERIC\" HYDROCODONE  -- Ibuprofen -- Nausea And Vomiting  -- Percocet (Oxycodone-Acetaminophen) -- Itching  -- Tylenol With Codeine #3 (Acetaminophen-Codeine) -- Nausea And Vomiting and Other (See                           Comments)   --  Vision blurry     PFSH:     The following portions of the patient's history were reviewed and updated as appropriate: Allergies / Current Medications / Past Medical History / Surgical History / Social History / Family History    PROBLEM LIST   Patient Active Problem List:    Right ovarian cyst      PAST MEDICAL HISTORY  Past Medical History:  No date: Acid " reflux     Comment:  no meds  No date: Anxiety  No date: Arthritis     Comment:  rt knee  No date: Depression  No date: Disease of thyroid gland     Comment:  low  No date: Ovarian cyst     Comment:  LEFT  No date: Ovarian cyst     Comment:  right side  No date: Pelvic pain     Comment:  right side  No date: Pelvic pain in female  No date: PONV (postoperative nausea and vomiting)    SURGICAL HISTORY  Past Surgical History:  2016: ABDOMINOPLASTY  1983: APPENDECTOMY  2012: CHOLECYSTECTOMY  2013: COLONOSCOPY  2008: DENTAL PROCEDURE  10/17/2016: DIAGNOSTIC LAPAROSCOPY; Bilateral     Comment:  Procedure: LAPAROSCOPY W/ JAMAL SALPINGECTOMY LT               SALPINGO-OOPHORECTOMY;  Surgeon: Bisi Donnelly MD;                Location: [facility];  Service:   5/7/2018: DIAGNOSTIC LAPAROSCOPY; Right     Comment:  Procedure: LAPAROSCOPIC RIGHT OVARIAN CYSTECTOMY LYSIS               OF ADHESIONS LEFT ABDOMINAL WALL;  Surgeon: Bisi Donnelly MD;  Location: [facility];  Service:               Gynecology  2013: ENDOSCOPY  No date: KNEE ARTHROSCOPY; Right     Comment:  X 3  1994: LAPAROSCOPIC TUBAL LIGATION; Bilateral  2001: LYMPH NODE BIOPSY; Left     Comment:  GROIN    SOCIAL HISTORY  Social History   Socioeconomic History     Marital status:    Tobacco Use     Smoking status: Every Day       Packs/day: 1.00       Years: 30.00       Pack years: 30       Types: Cigarettes     Smokeless tobacco: Never   Vaping Use     Vaping Use: Never used   Substance and Sexual Activity     Alcohol use: No       Comment: FORMER      Drug use: No      FAMILY HISTORY  Review of patient's family history indicates:      IMMUNIZATION HISTORY    Immunization History  Administered            Date(s) Administered   COVID-19 (PFIZER) PURPLE CAP                         05/18/2021      Labs:    Lab Results      Component                Value               Date                      NA                       141                  07/05/2022                K                        3.9                 07/05/2022                CALCIUM                  9.9                 07/05/2022                AST                      13                  07/05/2022                ALT                      10                  07/05/2022                BUN                      15                  07/05/2022                CREATININE               0.70                07/05/2022                CREATININE               0.87                03/10/2022                CREATININE               0.88                09/10/2021                EGFRIFNONA               67                  09/10/2021              Lab Results      Component                Value               Date                      HGBA1C                   5.20                09/10/2021                TSH                      1.350               09/10/2021                FREET4                   1.6                 05/08/2020              Lab Results      Component                Value               Date                      LDL                      186 (H)             09/10/2021                HDL                      54                  09/10/2021                TRIG                     154 (H)             09/10/2021                CHOLHDLRATIO             4.8                 05/08/2020              Lab Results      Component                Value               Date                      IYSV10DL                 54.2                09/10/2021               Lab Results      Component                Value               Date                      WBC                      8.22                07/05/2022                HGB                      13.9                07/05/2022                MCV                      90.5                07/05/2022                PLT                      342                 07/05/2022              Lab Results      Component                Value               Date                       LEUKOCYTESUR             Negative            09/19/2022              No results found for: HEPCVIRUSABY        ASSESSMENT & PLAN    ANNUAL WELLNESS EXAM / PHYSICAL     HEALTHCARE MAINTENANCE ISSUES    Cancer Screening:  Colon: Initial/Next screening at age: ORDERED COLONOSCOPY  Repeat colon cancer screening: every 3-5 years  Breast: Recommended monthly self exams; annual professional exam  Mammogram: every 1 year  Cervical: 3 years  Skin: Monthly self skin examination, annual exam by health professional      Lifestyle Counseling:  Lifestyle Modifications: Continue good lifestyle choices/modifications and Improve dietary compliance  Safety Issues: Always wear seatbelt, Avoid texting while driving   Use sunscreen, regular skin examination  Recommended annual dental/vision examination.  Emotional/Stress/Sleep: Reviewed and  given when appropriate  Abdominal Pain  This is a chronic problem. The current episode started more than 1 year ago. The problem occurs daily. The problem has been waxing and waning. Pain location: generalized tightness of abdomen and sides. Associated symptoms include frequency, nausea and vomiting. Nothing aggravates the pain. The pain is relieved by nothing. She has tried antacids and acetaminophen for the symptoms. The treatment provided no relief. Prior diagnostic workup includes GI consult and CT scan. Her past medical history is significant for abdominal surgery. Cholecystectomy   Waleska MCCAULEY Alyssa  reports that she has been smoking cigarettes. She has a 30.00 pack-year smoking history. She has never used smokeless tobacco.. I have educated her on the risk of diseases from using tobacco products such as cancer, COPD and heart disease.     I advised her to quit and she is not willing to quit.    I spent 3  minutes counseling the patient.       Brief Urine Lab Results  (Last result in the past 365 days)      Color   Clarity   Blood   Leuk Est   Nitrite   Protein   CREAT   Urine HCG         "10/20/22 1247 Yellow   Cloudy   Trace   Negative   Negative   Negative               Last Urine Toxicity     LAST URINE TOXICITY RESULTS Latest Ref Rng & Units 10/20/2022    AMPHETAMINES SCREEN, URINE Negative Negative    BARBITURATES SCREEN Negative Negative    BENZODIAZEPINE SCREEN, URINE Negative Negative    BUPRENORPHINEUR Negative Negative    COCAINE SCREEN, URINE Negative Negative    METHADONE SCREEN, URINE Negative Negative    METHAMPHETAMINEUR Negative Negative            Objective   Vital Signs:   /88   Pulse 70   Temp 97.4 °F (36.3 °C)   Ht 167.6 cm (66\")   Wt 67 kg (147 lb 9.6 oz)   SpO2 99%   BMI 23.82 kg/m²     Physical Exam  Vitals reviewed.   Constitutional:       Appearance: Normal appearance. She is well-developed.   HENT:      Head: Normocephalic and atraumatic.   Eyes:      Conjunctiva/sclera: Conjunctivae normal.      Pupils: Pupils are equal, round, and reactive to light.   Cardiovascular:      Rate and Rhythm: Normal rate and regular rhythm.      Heart sounds: No murmur heard.  Pulmonary:      Effort: Pulmonary effort is normal.      Breath sounds: Normal breath sounds. No wheezing or rhonchi.   Abdominal:      General: Abdomen is flat. There is no distension.      Palpations: Abdomen is soft.   Musculoskeletal:      Right lower leg: No edema.      Left lower leg: No edema.   Skin:     General: Skin is warm and dry.   Neurological:      Mental Status: She is alert and oriented to person, place, and time.   Psychiatric:         Mood and Affect: Mood and affect normal.         Behavior: Behavior normal.         Thought Content: Thought content normal.         Judgment: Judgment normal.        Result Review :   The following data was reviewed by: LASHANDA Urban on 10/20/2022:  Common labs    Common Labs 3/10/22 3/10/22 7/5/22 7/5/22    1615 1615 1733 1733   Glucose  94  85   BUN  15  15   Creatinine  0.87  0.70   Sodium  140  141   Potassium  4.6  3.9   Chloride  103  103 "   Calcium  10.8 (A)  9.9   Albumin  5.10  4.80   Total Bilirubin  0.2  <0.2   Alkaline Phosphatase  89  90   AST (SGOT)  17  13   ALT (SGPT)  11  10   WBC 8.15  8.22    Hemoglobin 13.8  13.9    Hematocrit 41.7  40.1    Platelets 341  342    (A) Abnormal value            Data reviewed: Previous notes Lara Becerra            Current Outpatient Medications on File Prior to Visit   Medication Sig Dispense Refill   • cetirizine (zyrTEC) 10 MG tablet TAKE 1 TABLET BY MOUTH AT BEDTIME 90 tablet 0   • Cyanocobalamin (Vitamin B-12) 1000 MCG sublingual tablet Place 1 tablet under the tongue Daily. 90 tablet 0   • estradiol (ESTRACE) 0.1 MG/GM vaginal cream inset 0.5 grams into THE VAGINA EVERY NIGHT AT BEDTIME for TWO WEEKS, THEN USE 0.5 grams TWICE A WEEK.     • vitamin D (ERGOCALCIFEROL) 1.25 MG (92119 UT) capsule capsule TAKE 1 CAPSULE BY MOUTH 1 TIME EVERY WEEK 13 capsule 0   • [DISCONTINUED] traMADol (ULTRAM) 50 MG tablet Take 50 mg by mouth Every 6 (Six) Hours As Needed for Moderate Pain  (1/2 tab).       No current facility-administered medications on file prior to visit.        Assessment and Plan    Diagnoses and all orders for this visit:    1. Annual physical exam (Primary)  -     CBC & Differential  -     Comprehensive Metabolic Panel    2. Encounter for hepatitis C screening test for low risk patient  Comments:  One-time screening for hepatitis C  Orders:  -     Hepatitis C antibody    3. Vitamin D deficiency  -     Vitamin D,25-Hydroxy    4. Vitamin B12 deficiency  -     Vitamin B12 & Folate    5. Lipid screening  Comments:  Patient agreeable with lipid screening  Orders:  -     Lipid Panel    6. Tobacco abuse  Comments:  Patient not interested in quitting at this time, will do low-dose CT screening.  Orders:  -      CT Chest Low Dose Cancer Screening WO; Future    7. Colon cancer screening  -     Ambulatory Referral For Screening Colonoscopy    8. Urinary symptom or sign  Comments:  Urine cloudy, negative we  will send out for culture to 3 days of Cipro until culture comes back we will also send over some Pyridium to help as needed for bladd  Orders:  -     POCT urinalysis dipstick, automated  -     Urine Culture - Urine, Urine, Clean Catch    9. Generalized abdominal pain  Comments:  Referral to Dr. Houston for colonoscopy and work-up, will do a trial of tramadol, patient is agreeable UDS was negative Jonathan appropriate  Orders:  -     POC Urine Drug Screen [facility]  -     traMADol (ULTRAM) 50 MG tablet; Take 1 tablet by mouth Every 6 (Six) Hours As Needed for Moderate Pain (1/2 tab).  Dispense: 30 tablet; Refill: 1    Other orders  -     ciprofloxacin (Cipro) 500 MG tablet; Take 1 tablet by mouth 2 (Two) Times a Day for 3 days.  Dispense: 6 tablet; Refill: 0  -     phenazopyridine (Pyridium) 100 MG tablet; Take 1 tablet by mouth Daily As Needed for Bladder Spasms.  Dispense: 15 tablet; Refill: 0        Follow Up   No follow-ups on file.  Patient was given instructions and counseling regarding her condition or for health maintenance advice. Please see specific information pulled into the AVS if appropriate.

## 2022-10-21 LAB
25(OH)D3 SERPL-MCNC: 55.5 NG/ML (ref 30–100)
ALBUMIN SERPL-MCNC: 4.6 G/DL (ref 3.5–5.2)
ALBUMIN/GLOB SERPL: 1.6 G/DL
ALP SERPL-CCNC: 97 U/L (ref 39–117)
ALT SERPL W P-5'-P-CCNC: 18 U/L (ref 1–33)
ANION GAP SERPL CALCULATED.3IONS-SCNC: 11.5 MMOL/L (ref 5–15)
AST SERPL-CCNC: 18 U/L (ref 1–32)
BASOPHILS # BLD AUTO: 0.06 10*3/MM3 (ref 0–0.2)
BASOPHILS NFR BLD AUTO: 0.8 % (ref 0–1.5)
BILIRUB SERPL-MCNC: 0.2 MG/DL (ref 0–1.2)
BUN SERPL-MCNC: 11 MG/DL (ref 6–20)
BUN/CREAT SERPL: 11.8 (ref 7–25)
CALCIUM SPEC-SCNC: 10.5 MG/DL (ref 8.6–10.5)
CHLORIDE SERPL-SCNC: 102 MMOL/L (ref 98–107)
CHOLEST SERPL-MCNC: 242 MG/DL (ref 0–200)
CO2 SERPL-SCNC: 24.5 MMOL/L (ref 22–29)
CREAT SERPL-MCNC: 0.93 MG/DL (ref 0.57–1)
DEPRECATED RDW RBC AUTO: 41.1 FL (ref 37–54)
EGFRCR SERPLBLD CKD-EPI 2021: 72.3 ML/MIN/1.73
EOSINOPHIL # BLD AUTO: 0.08 10*3/MM3 (ref 0–0.4)
EOSINOPHIL NFR BLD AUTO: 1.1 % (ref 0.3–6.2)
ERYTHROCYTE [DISTWIDTH] IN BLOOD BY AUTOMATED COUNT: 12.5 % (ref 12.3–15.4)
GLOBULIN UR ELPH-MCNC: 2.8 GM/DL
GLUCOSE SERPL-MCNC: 72 MG/DL (ref 65–99)
HCT VFR BLD AUTO: 42.8 % (ref 34–46.6)
HCV AB SER DONR QL: NORMAL
HDLC SERPL-MCNC: 53 MG/DL (ref 40–60)
HGB BLD-MCNC: 14.8 G/DL (ref 12–15.9)
IMM GRANULOCYTES # BLD AUTO: 0.01 10*3/MM3 (ref 0–0.05)
IMM GRANULOCYTES NFR BLD AUTO: 0.1 % (ref 0–0.5)
LDLC SERPL CALC-MCNC: 147 MG/DL (ref 0–100)
LDLC/HDLC SERPL: 2.69 {RATIO}
LYMPHOCYTES # BLD AUTO: 2.61 10*3/MM3 (ref 0.7–3.1)
LYMPHOCYTES NFR BLD AUTO: 36.7 % (ref 19.6–45.3)
MCH RBC QN AUTO: 31.4 PG (ref 26.6–33)
MCHC RBC AUTO-ENTMCNC: 34.6 G/DL (ref 31.5–35.7)
MCV RBC AUTO: 90.9 FL (ref 79–97)
MONOCYTES # BLD AUTO: 0.57 10*3/MM3 (ref 0.1–0.9)
MONOCYTES NFR BLD AUTO: 8 % (ref 5–12)
NEUTROPHILS NFR BLD AUTO: 3.79 10*3/MM3 (ref 1.7–7)
NEUTROPHILS NFR BLD AUTO: 53.3 % (ref 42.7–76)
NRBC BLD AUTO-RTO: 0.1 /100 WBC (ref 0–0.2)
PLATELET # BLD AUTO: 356 10*3/MM3 (ref 140–450)
PMV BLD AUTO: 10.3 FL (ref 6–12)
POTASSIUM SERPL-SCNC: 4.4 MMOL/L (ref 3.5–5.2)
PROT SERPL-MCNC: 7.4 G/DL (ref 6–8.5)
RBC # BLD AUTO: 4.71 10*6/MM3 (ref 3.77–5.28)
SODIUM SERPL-SCNC: 138 MMOL/L (ref 136–145)
TRIGL SERPL-MCNC: 232 MG/DL (ref 0–150)
VLDLC SERPL-MCNC: 42 MG/DL (ref 5–40)
WBC NRBC COR # BLD: 7.12 10*3/MM3 (ref 3.4–10.8)

## 2022-10-24 RX ORDER — ATORVASTATIN CALCIUM 10 MG/1
10 TABLET, FILM COATED ORAL DAILY
Qty: 90 TABLET | Refills: 1 | Status: SHIPPED | OUTPATIENT
Start: 2022-10-24 | End: 2022-12-14

## 2022-10-27 ENCOUNTER — HOSPITAL ENCOUNTER (OUTPATIENT)
Dept: CT IMAGING | Facility: HOSPITAL | Age: 57
Discharge: HOME | End: 2022-10-27
Admitting: NURSE PRACTITIONER

## 2022-10-27 DIAGNOSIS — Z72.0 TOBACCO ABUSE: ICD-10-CM

## 2022-10-27 PROCEDURE — 71271 CT THORAX LUNG CANCER SCR C-: CPT

## 2022-10-31 ENCOUNTER — TELEPHONE (OUTPATIENT)
Dept: UROLOGY | Facility: CLINIC | Age: 57
End: 2022-10-31

## 2022-11-03 ENCOUNTER — TELEPHONE (OUTPATIENT)
Dept: FAMILY MEDICINE CLINIC | Facility: CLINIC | Age: 57
End: 2022-11-03

## 2022-11-03 NOTE — TELEPHONE ENCOUNTER
Patient called is is stating that she has shortness of breath and side of chest feels like someone is squeezing her. I advised her to ER but she doesn't want to do that. She has an appointment tomorrow and said if she has to she will just wait. She then asked if someone could call her back please. [phone]

## 2022-11-04 ENCOUNTER — OFFICE VISIT (OUTPATIENT)
Dept: FAMILY MEDICINE CLINIC | Facility: CLINIC | Age: 57
End: 2022-11-04

## 2022-11-04 VITALS
TEMPERATURE: 97.1 F | DIASTOLIC BLOOD PRESSURE: 86 MMHG | HEART RATE: 66 BPM | HEIGHT: 66 IN | SYSTOLIC BLOOD PRESSURE: 160 MMHG | OXYGEN SATURATION: 100 % | BODY MASS INDEX: 22.68 KG/M2 | WEIGHT: 141.1 LBS

## 2022-11-04 DIAGNOSIS — J01.10 ACUTE NON-RECURRENT FRONTAL SINUSITIS: ICD-10-CM

## 2022-11-04 DIAGNOSIS — R05.1 ACUTE COUGH: Primary | ICD-10-CM

## 2022-11-04 DIAGNOSIS — R07.89 CHEST DISCOMFORT: ICD-10-CM

## 2022-11-04 DIAGNOSIS — R06.02 SOB (SHORTNESS OF BREATH): ICD-10-CM

## 2022-11-04 LAB
EXPIRATION DATE: NORMAL
FLUAV AG UPPER RESP QL IA.RAPID: NOT DETECTED
FLUBV AG UPPER RESP QL IA.RAPID: NOT DETECTED
INTERNAL CONTROL: NORMAL
Lab: NORMAL
SARS-COV-2 AG UPPER RESP QL IA.RAPID: NOT DETECTED

## 2022-11-04 PROCEDURE — 87428 SARSCOV & INF VIR A&B AG IA: CPT | Performed by: NURSE PRACTITIONER

## 2022-11-04 PROCEDURE — 99214 OFFICE O/P EST MOD 30 MIN: CPT | Performed by: NURSE PRACTITIONER

## 2022-11-04 RX ORDER — FLUTICASONE PROPIONATE 50 MCG
2 SPRAY, SUSPENSION (ML) NASAL DAILY
Qty: 15 ML | Refills: 0 | Status: SHIPPED | OUTPATIENT
Start: 2022-11-04 | End: 2022-12-14

## 2022-11-04 RX ORDER — ALBUTEROL SULFATE 90 UG/1
2 AEROSOL, METERED RESPIRATORY (INHALATION) 2 TIMES DAILY PRN
Qty: 8 G | Refills: 1 | Status: SHIPPED | OUTPATIENT
Start: 2022-11-04 | End: 2022-12-14

## 2022-11-04 RX ORDER — SPIRONOLACTONE 50 MG/1
50 TABLET, FILM COATED ORAL DAILY
COMMUNITY
Start: 2022-10-18 | End: 2022-12-14

## 2022-11-04 RX ORDER — DOXYCYCLINE HYCLATE 100 MG/1
100 CAPSULE ORAL 2 TIMES DAILY
Qty: 20 CAPSULE | Refills: 0 | Status: SHIPPED | OUTPATIENT
Start: 2022-11-04 | End: 2022-12-14

## 2022-11-04 RX ORDER — BROMPHENIRAMINE MALEATE, PSEUDOEPHEDRINE HYDROCHLORIDE, AND DEXTROMETHORPHAN HYDROBROMIDE 2; 30; 10 MG/5ML; MG/5ML; MG/5ML
5 SYRUP ORAL 4 TIMES DAILY PRN
Qty: 250 ML | Refills: 0 | Status: SHIPPED | OUTPATIENT
Start: 2022-11-04 | End: 2022-12-14

## 2022-11-04 RX ORDER — TOLTERODINE 2 MG/1
2 CAPSULE, EXTENDED RELEASE ORAL DAILY
COMMUNITY
Start: 2022-10-18

## 2022-11-04 NOTE — PROGRESS NOTES
"Chief Complaint  Headache and Nasal Congestion    Subjective        Medical History: has a past medical history of Acid reflux, Anxiety, Arthritis, Depression, Disease of thyroid gland, Ovarian cyst, Ovarian cyst, Pelvic pain, Pelvic pain in female, and PONV (postoperative nausea and vomiting).     Surgical History: has a past surgical history that includes Appendectomy (1983); Lymph node biopsy (Left, 2001); Cholecystectomy (2012); Laparoscopic tubal ligation (Bilateral, 1994); Abdominoplasty (2016); Knee arthroscopy (Right); Dental surgery (2008); Laparoscopy (Bilateral, 10/17/2016); Colonoscopy (2013); Laparoscopy (Right, 5/7/2018); and Esophagogastroduodenoscopy (2013).     Family History: family history includes Colon cancer in her maternal grandfather.     Social History: reports that she has been smoking cigarettes. She has a 30.00 pack-year smoking history. She has never used smokeless tobacco. She reports that she does not drink alcohol and does not use drugs.    Waleska Shah presents to [facility]  Sinusitis  This is a new problem. The current episode started in the past 7 days. The problem has been gradually worsening since onset. There has been no fever. Associated symptoms include congestion, coughing, headaches and shortness of breath. Past treatments include nothing. The treatment provided no relief.   Shortness of Breath  This is a chronic problem. The current episode started more than 1 year ago. The problem has been gradually worsening. Associated symptoms include chest pain, headaches, rhinorrhea and wheezing. Pertinent negatives include no abdominal pain. Risk factors include smoking. Her past medical history is significant for chronic lung disease.       Objective   Vital Signs:   /86   Pulse 66   Temp 97.1 °F (36.2 °C)   Ht 167.6 cm (66\")   Wt 64 kg (141 lb 1.6 oz)   SpO2 100%   BMI 22.77 kg/m²       Wt Readings from Last 3 Encounters: "   11/04/22 64 kg (141 lb 1.6 oz)   10/20/22 67 kg (147 lb 9.6 oz)   09/19/22 65.4 kg (144 lb 3.2 oz)        BP Readings from Last 3 Encounters:   11/04/22 160/86   10/20/22 146/88   07/05/22 150/92        BMI is within normal parameters. No other follow-up for BMI required.       Physical Exam  Vitals reviewed.   Constitutional:       Appearance: Normal appearance. She is well-developed.   HENT:      Head: Normocephalic and atraumatic.   Eyes:      Conjunctiva/sclera: Conjunctivae normal.      Pupils: Pupils are equal, round, and reactive to light.   Cardiovascular:      Rate and Rhythm: Normal rate and regular rhythm.      Heart sounds: No murmur heard.    No friction rub.   Pulmonary:      Effort: Pulmonary effort is normal.      Breath sounds: Normal breath sounds. No wheezing or rhonchi.   Chest:       Skin:     General: Skin is warm and dry.   Neurological:      Mental Status: She is alert and oriented to person, place, and time.   Psychiatric:         Mood and Affect: Mood and affect normal.         Behavior: Behavior normal.         Thought Content: Thought content normal.         Judgment: Judgment normal.        Result Review :  {The following data was reviewed by LASHANDA Urban on 11/04/2022.  Common labs    Common Labs 3/10/22 3/10/22 7/5/22 7/5/22 10/20/22 10/20/22 10/20/22    1615 1615 1733 1733 1250 1250 1250   Glucose  94  85 72     BUN  15  15 11     Creatinine  0.87  0.70 0.93     Sodium  140  141 138     Potassium  4.6  3.9 4.4     Chloride  103  103 102     Calcium  10.8 (A)  9.9 10.5     Albumin  5.10  4.80 4.60     Total Bilirubin  0.2  <0.2 0.2     Alkaline Phosphatase  89  90 97     AST (SGOT)  17  13 18     ALT (SGPT)  11  10 18     WBC 8.15  8.22    7.12   Hemoglobin 13.8  13.9    14.8   Hematocrit 41.7  40.1    42.8   Platelets 341  342    356   Total Cholesterol      242 (A)    Triglycerides      232 (A)    HDL Cholesterol      53    LDL Cholesterol       147 (A)    (A)  Abnormal value                    Negative COVID and flu    Current Outpatient Medications on File Prior to Visit   Medication Sig Dispense Refill   • atorvastatin (LIPITOR) 10 MG tablet Take 1 tablet by mouth Daily. 90 tablet 1   • cetirizine (zyrTEC) 10 MG tablet TAKE 1 TABLET BY MOUTH AT BEDTIME 90 tablet 0   • Cyanocobalamin (Vitamin B-12) 1000 MCG sublingual tablet Place 1 tablet under the tongue Daily. 90 tablet 0   • estradiol (ESTRACE) 0.1 MG/GM vaginal cream inset 0.5 grams into THE VAGINA EVERY NIGHT AT BEDTIME for TWO WEEKS, THEN USE 0.5 grams TWICE A WEEK.     • phenazopyridine (Pyridium) 100 MG tablet Take 1 tablet by mouth Daily As Needed for Bladder Spasms. 15 tablet 0   • spironolactone (ALDACTONE) 50 MG tablet Take 1 tablet by mouth Daily.     • tolterodine LA (DETROL LA) 2 MG 24 hr capsule Take 1 capsule by mouth Daily.     • traMADol (ULTRAM) 50 MG tablet Take 1 tablet by mouth Every 6 (Six) Hours As Needed for Moderate Pain (1/2 tab). 30 tablet 1   • vitamin D (ERGOCALCIFEROL) 1.25 MG (08127 UT) capsule capsule TAKE 1 CAPSULE BY MOUTH 1 TIME EVERY WEEK 13 capsule 0     No current facility-administered medications on file prior to visit.        Assessment and Plan  Diagnoses and all orders for this visit:    1. Acute cough (Primary)  -     POCT SARS-CoV-2 Antigen YUSRA + Flu    2. Acute non-recurrent frontal sinusitis  Comments:  We will treat with doxycycline patient is agreeable    3. SOB (shortness of breath)  Comments:  Will start on albuterol inhaler to see if it helps with the shortness of breath.  Patient is agreeable  Orders:  -     Ambulatory Referral to Cardiology    4. Chest discomfort  Comments:  Unknown cause at this time patient would like to see cardiology will refer her over to cardiology for further work-up and management.  Orders:  -     Ambulatory Referral to Cardiology    Other orders  -     albuterol sulfate  (90 Base) MCG/ACT inhaler; Inhale 2 puffs 2 (Two) Times a Day  As Needed for Wheezing or Shortness of Air.  Dispense: 8 g; Refill: 1  -     doxycycline (VIBRAMYCIN) 100 MG capsule; Take 1 capsule by mouth 2 (Two) Times a Day.  Dispense: 20 capsule; Refill: 0  -     brompheniramine-pseudoephedrine-DM 30-2-10 MG/5ML syrup; Take 5 mL by mouth 4 (Four) Times a Day As Needed for Congestion, Cough or Allergies.  Dispense: 250 mL; Refill: 0  -     fluticasone (Flonase) 50 MCG/ACT nasal spray; 2 sprays into the nostril(s) as directed by provider Daily.  Dispense: 15 mL; Refill: 0        Follow Up   Return for If symptoms do not improve new concerning symptoms.  Patient was given instructions and counseling regarding her condition or for health maintenance advice. Please see specific information pulled into the AVS if appropriate.       Part of this note may be electronic transcription/translation of spoken language to printed text using the Dragon dictation system

## 2022-11-23 ENCOUNTER — TELEPHONE (OUTPATIENT)
Dept: UROLOGY | Facility: CLINIC | Age: 57
End: 2022-11-23

## 2022-12-12 NOTE — TELEPHONE ENCOUNTER
Spoke with patient informing her that the cystoscopy in the office takes about 45 seconds and in the  Hospital would take a half day. She will call back the first week of January to schedule.

## 2022-12-13 ENCOUNTER — APPOINTMENT (OUTPATIENT)
Dept: PREADMISSION TESTING | Facility: HOSPITAL | Age: 57
End: 2022-12-13

## 2022-12-14 ENCOUNTER — PRE-ADMISSION TESTING (OUTPATIENT)
Dept: PREADMISSION TESTING | Facility: HOSPITAL | Age: 57
End: 2022-12-14

## 2022-12-14 VITALS
WEIGHT: 142 LBS | RESPIRATION RATE: 16 BRPM | HEART RATE: 83 BPM | DIASTOLIC BLOOD PRESSURE: 90 MMHG | SYSTOLIC BLOOD PRESSURE: 132 MMHG | TEMPERATURE: 97.3 F | BODY MASS INDEX: 22.82 KG/M2 | OXYGEN SATURATION: 96 % | HEIGHT: 66 IN

## 2022-12-14 LAB
ANION GAP SERPL CALCULATED.3IONS-SCNC: 11.9 MMOL/L (ref 5–15)
BUN SERPL-MCNC: 16 MG/DL (ref 6–20)
BUN/CREAT SERPL: 14.8 (ref 7–25)
CALCIUM SPEC-SCNC: 9.9 MG/DL (ref 8.6–10.5)
CHLORIDE SERPL-SCNC: 106 MMOL/L (ref 98–107)
CO2 SERPL-SCNC: 25.1 MMOL/L (ref 22–29)
CREAT SERPL-MCNC: 1.08 MG/DL (ref 0.57–1)
DEPRECATED RDW RBC AUTO: 40.7 FL (ref 37–54)
EGFRCR SERPLBLD CKD-EPI 2021: 60.4 ML/MIN/1.73
ERYTHROCYTE [DISTWIDTH] IN BLOOD BY AUTOMATED COUNT: 12.2 % (ref 12.3–15.4)
GLUCOSE SERPL-MCNC: 106 MG/DL (ref 65–99)
HCT VFR BLD AUTO: 38.2 % (ref 34–46.6)
HGB BLD-MCNC: 13 G/DL (ref 12–15.9)
MCH RBC QN AUTO: 30.4 PG (ref 26.6–33)
MCHC RBC AUTO-ENTMCNC: 34 G/DL (ref 31.5–35.7)
MCV RBC AUTO: 89.3 FL (ref 79–97)
PLATELET # BLD AUTO: 297 10*3/MM3 (ref 140–450)
PMV BLD AUTO: 10 FL (ref 6–12)
POTASSIUM SERPL-SCNC: 4.6 MMOL/L (ref 3.5–5.2)
RBC # BLD AUTO: 4.28 10*6/MM3 (ref 3.77–5.28)
SODIUM SERPL-SCNC: 143 MMOL/L (ref 136–145)
WBC NRBC COR # BLD: 6.75 10*3/MM3 (ref 3.4–10.8)

## 2022-12-14 PROCEDURE — 36415 COLL VENOUS BLD VENIPUNCTURE: CPT

## 2022-12-14 PROCEDURE — 85027 COMPLETE CBC AUTOMATED: CPT

## 2022-12-14 PROCEDURE — 80048 BASIC METABOLIC PNL TOTAL CA: CPT

## 2022-12-14 NOTE — DISCHARGE INSTRUCTIONS
Take the following medications the morning of surgery:    NO MEDS AM OF SURGERY    ARRIVE AT 9:15      If you are on prescription narcotic pain medication to control your pain you may also take that medication the morning of surgery.    General Instructions:  Do not eat solid food after midnight the night before surgery.  You may drink clear liquids day of surgery but must stop at least one hour before your hospital arrival time.  It is beneficial for you to have a clear drink that contains carbohydrates the day of surgery.  We suggest a 12 to 20 ounce bottle of Gatorade or Powerade for non-diabetic patients or a 12 to 20 ounce bottle of G2 or Powerade Zero for diabetic patients. (Pediatric patients, are not advised to drink a 12 to 20 ounce carbohydrate drink)    Clear liquids are liquids you can see through.  Nothing red in color.     Plain water                               Sports drinks  Sodas                                   Gelatin (Jell-O)  Fruit juices without pulp such as white grape juice and apple juice  Popsicles that contain no fruit or yogurt  Tea or coffee (no cream or milk added)  Gatorade / Powerade  G2 / Powerade Zero      Patients who avoid smoking, chewing tobacco and alcohol for 4 weeks prior to surgery have a reduced risk of post-operative complications.  Quit smoking as many days before surgery as you can.  Do not smoke, use chewing tobacco or drink alcohol the day of surgery.   If applicable bring your C-PAP/ BI-PAP machine.  Bring any papers given to you in the doctor’s office.  Wear clean comfortable clothes.  Do not wear contact lenses, false eyelashes or make-up.  Bring a case for your glasses.   Bring crutches or walker if applicable.  Remove all piercings.  Leave jewelry and any other valuables at home.  Hair extensions with metal clips must be removed prior to surgery.  The Pre-Admission Testing nurse will instruct you to bring medications if unable to obtain an accurate list in  Pre-Admission Testing.            Preventing a Surgical Site Infection:  For 2 to 3 days before surgery, avoid shaving with a razor because the razor can irritate skin and make it easier to develop an infection.    Any areas of open skin can increase the risk of a post-operative wound infection by allowing bacteria to enter and travel throughout the body.  Notify your surgeon if you have any skin wounds / rashes even if it is not near the expected surgical site.  The area will need assessed to determine if surgery should be delayed until it is healed.  The night prior to surgery shower using a fresh bar of anti-bacterial soap (such as Dial) and clean washcloth.  Sleep in a clean bed with clean clothing.  Do not allow pets to sleep with you.  Shower on the morning of surgery using a fresh bar of anti-bacterial soap (such as Dial) and clean washcloth.  Dry with a clean towel and dress in clean clothing.  Ask your surgeon if you will be receiving antibiotics prior to surgery.  Make sure you, your family, and all healthcare providers clean their hands with soap and water or an alcohol based hand  before caring for you or your wound.    Day of surgery:  Your arrival time is approximately two hours before your scheduled surgery time.  Upon arrival, a Pre-op nurse and Anesthesiologist will review your health history, obtain vital signs, and answer questions you may have.  The only belongings needed at this time will be a list of your home medications and if applicable your C-PAP/BI-PAP machine.  A Pre-op nurse will start an IV and you may receive medication in preparation for surgery, including something to help you relax.     Please be aware that surgery does come with discomfort.  We want to make every effort to control your discomfort so please discuss any uncontrolled symptoms with your nurse.   Your doctor will most likely have prescribed pain medications.      If you are going home after surgery you will  receive individualized written care instructions before being discharged.  A responsible adult must drive you to and from the hospital on the day of your surgery and stay with you for 24 hours.  Discharge prescriptions can be filled by the hospital pharmacy during regular pharmacy hours.  If you are having surgery late in the day/evening your prescription may be e-prescribed to your pharmacy.  Please verify your pharmacy hours or chose a 24 hour pharmacy to avoid not having access to your prescription because your pharmacy has closed for the day.    If you are staying overnight following surgery, you will be transported to your hospital room following the recovery period.  [facility] has all private rooms.    If you have any questions please call Pre-Admission Testing at [phone].  Deductibles and co-payments are collected on the day of service. Please be prepared to pay the required co-pay, deductible or deposit on the day of service as defined by your plan.    Call your surgeon immediately if you experience any of the following symptoms:  Sore Throat  Shortness of Breath or difficulty breathing  Cough  Chills  Body soreness or muscle pain  Headache  Fever  New loss of taste or smell  Do not arrive for your surgery ill.  Your procedure will need to be rescheduled to another time.  You will need to call your physician before the day of surgery to avoid any unnecessary exposure to hospital staff as well as other patients.

## 2022-12-15 ENCOUNTER — HOSPITAL ENCOUNTER (OUTPATIENT)
Facility: HOSPITAL | Age: 57
Setting detail: HOSPITAL OUTPATIENT SURGERY
Discharge: HOME | End: 2022-12-15
Attending: OPHTHALMOLOGY | Admitting: OPHTHALMOLOGY

## 2022-12-15 ENCOUNTER — ANESTHESIA (OUTPATIENT)
Dept: PERIOP | Facility: HOSPITAL | Age: 57
End: 2022-12-15

## 2022-12-15 ENCOUNTER — ANESTHESIA EVENT (OUTPATIENT)
Dept: PERIOP | Facility: HOSPITAL | Age: 57
End: 2022-12-15

## 2022-12-15 VITALS
RESPIRATION RATE: 14 BRPM | TEMPERATURE: 98.6 F | HEART RATE: 81 BPM | SYSTOLIC BLOOD PRESSURE: 158 MMHG | OXYGEN SATURATION: 93 % | DIASTOLIC BLOOD PRESSURE: 94 MMHG

## 2022-12-15 DIAGNOSIS — Z98.890 POSTOPERATIVE STATE: Primary | ICD-10-CM

## 2022-12-15 PROCEDURE — 25010000002 HYDROMORPHONE PER 4 MG

## 2022-12-15 PROCEDURE — 25010000002 DEXAMETHASONE PER 1 MG

## 2022-12-15 PROCEDURE — 25010000002 MIDAZOLAM PER 1 MG: Performed by: ANESTHESIOLOGY

## 2022-12-15 PROCEDURE — 25010000002 FENTANYL CITRATE (PF) 50 MCG/ML SOLUTION

## 2022-12-15 PROCEDURE — 25010000002 ONDANSETRON PER 1 MG

## 2022-12-15 PROCEDURE — 25010000002 PROPOFOL 10 MG/ML EMULSION

## 2022-12-15 RX ORDER — DEXAMETHASONE SODIUM PHOSPHATE 4 MG/ML
INJECTION, SOLUTION INTRA-ARTICULAR; INTRALESIONAL; INTRAMUSCULAR; INTRAVENOUS; SOFT TISSUE AS NEEDED
Status: DISCONTINUED | OUTPATIENT
Start: 2022-12-15 | End: 2022-12-15 | Stop reason: SURG

## 2022-12-15 RX ORDER — SODIUM CHLORIDE 0.9 % (FLUSH) 0.9 %
3 SYRINGE (ML) INJECTION EVERY 12 HOURS SCHEDULED
Status: DISCONTINUED | OUTPATIENT
Start: 2022-12-15 | End: 2022-12-15 | Stop reason: HOSPADM

## 2022-12-15 RX ORDER — TRAMADOL HYDROCHLORIDE 50 MG/1
50 TABLET ORAL EVERY 6 HOURS PRN
Qty: 10 TABLET | Refills: 0 | Status: SHIPPED | OUTPATIENT
Start: 2022-12-15

## 2022-12-15 RX ORDER — SODIUM CHLORIDE 0.9 % (FLUSH) 0.9 %
3-10 SYRINGE (ML) INJECTION AS NEEDED
Status: DISCONTINUED | OUTPATIENT
Start: 2022-12-15 | End: 2022-12-15 | Stop reason: HOSPADM

## 2022-12-15 RX ORDER — NALOXONE HCL 0.4 MG/ML
0.2 VIAL (ML) INJECTION AS NEEDED
Status: DISCONTINUED | OUTPATIENT
Start: 2022-12-15 | End: 2022-12-15 | Stop reason: HOSPADM

## 2022-12-15 RX ORDER — LIDOCAINE HYDROCHLORIDE 10 MG/ML
0.5 INJECTION, SOLUTION EPIDURAL; INFILTRATION; INTRACAUDAL; PERINEURAL ONCE AS NEEDED
Status: DISCONTINUED | OUTPATIENT
Start: 2022-12-15 | End: 2022-12-15 | Stop reason: HOSPADM

## 2022-12-15 RX ORDER — ERYTHROMYCIN 5 MG/G
OINTMENT OPHTHALMIC 2 TIMES DAILY
Qty: 3.5 G | Refills: 0 | Status: SHIPPED | OUTPATIENT
Start: 2022-12-15 | End: 2023-03-03

## 2022-12-15 RX ORDER — FAMOTIDINE 10 MG/ML
20 INJECTION, SOLUTION INTRAVENOUS ONCE
Status: COMPLETED | OUTPATIENT
Start: 2022-12-15 | End: 2022-12-15

## 2022-12-15 RX ORDER — PROPOFOL 10 MG/ML
VIAL (ML) INTRAVENOUS AS NEEDED
Status: DISCONTINUED | OUTPATIENT
Start: 2022-12-15 | End: 2022-12-15 | Stop reason: SURG

## 2022-12-15 RX ORDER — DIPHENHYDRAMINE HYDROCHLORIDE 50 MG/ML
12.5 INJECTION INTRAMUSCULAR; INTRAVENOUS
Status: DISCONTINUED | OUTPATIENT
Start: 2022-12-15 | End: 2022-12-15 | Stop reason: HOSPADM

## 2022-12-15 RX ORDER — SCOLOPAMINE TRANSDERMAL SYSTEM 1 MG/1
1 PATCH, EXTENDED RELEASE TRANSDERMAL ONCE
Status: DISCONTINUED | OUTPATIENT
Start: 2022-12-15 | End: 2022-12-15 | Stop reason: HOSPADM

## 2022-12-15 RX ORDER — LABETALOL HYDROCHLORIDE 5 MG/ML
5 INJECTION, SOLUTION INTRAVENOUS
Status: DISCONTINUED | OUTPATIENT
Start: 2022-12-15 | End: 2022-12-15 | Stop reason: HOSPADM

## 2022-12-15 RX ORDER — EPHEDRINE SULFATE 50 MG/ML
5 INJECTION, SOLUTION INTRAVENOUS ONCE AS NEEDED
Status: DISCONTINUED | OUTPATIENT
Start: 2022-12-15 | End: 2022-12-15 | Stop reason: HOSPADM

## 2022-12-15 RX ORDER — MAGNESIUM HYDROXIDE 1200 MG/15ML
LIQUID ORAL AS NEEDED
Status: DISCONTINUED | OUTPATIENT
Start: 2022-12-15 | End: 2022-12-15 | Stop reason: HOSPADM

## 2022-12-15 RX ORDER — DIPHENHYDRAMINE HCL 25 MG
25 CAPSULE ORAL
Status: DISCONTINUED | OUTPATIENT
Start: 2022-12-15 | End: 2022-12-15 | Stop reason: HOSPADM

## 2022-12-15 RX ORDER — LIDOCAINE HYDROCHLORIDE 20 MG/ML
INJECTION, SOLUTION INFILTRATION; PERINEURAL AS NEEDED
Status: DISCONTINUED | OUTPATIENT
Start: 2022-12-15 | End: 2022-12-15 | Stop reason: SURG

## 2022-12-15 RX ORDER — FLUMAZENIL 0.1 MG/ML
0.2 INJECTION INTRAVENOUS AS NEEDED
Status: DISCONTINUED | OUTPATIENT
Start: 2022-12-15 | End: 2022-12-15 | Stop reason: HOSPADM

## 2022-12-15 RX ORDER — SODIUM CHLORIDE, SODIUM LACTATE, POTASSIUM CHLORIDE, CALCIUM CHLORIDE 600; 310; 30; 20 MG/100ML; MG/100ML; MG/100ML; MG/100ML
9 INJECTION, SOLUTION INTRAVENOUS CONTINUOUS
Status: DISCONTINUED | OUTPATIENT
Start: 2022-12-15 | End: 2022-12-15 | Stop reason: HOSPADM

## 2022-12-15 RX ORDER — HYDRALAZINE HYDROCHLORIDE 20 MG/ML
5 INJECTION INTRAMUSCULAR; INTRAVENOUS
Status: DISCONTINUED | OUTPATIENT
Start: 2022-12-15 | End: 2022-12-15 | Stop reason: HOSPADM

## 2022-12-15 RX ORDER — ONDANSETRON 2 MG/ML
INJECTION INTRAMUSCULAR; INTRAVENOUS AS NEEDED
Status: DISCONTINUED | OUTPATIENT
Start: 2022-12-15 | End: 2022-12-15 | Stop reason: SURG

## 2022-12-15 RX ORDER — GLYCOPYRROLATE 0.2 MG/ML
INJECTION INTRAMUSCULAR; INTRAVENOUS AS NEEDED
Status: DISCONTINUED | OUTPATIENT
Start: 2022-12-15 | End: 2022-12-15 | Stop reason: SURG

## 2022-12-15 RX ORDER — FENTANYL CITRATE 50 UG/ML
50 INJECTION, SOLUTION INTRAMUSCULAR; INTRAVENOUS
Status: DISCONTINUED | OUTPATIENT
Start: 2022-12-15 | End: 2022-12-15 | Stop reason: HOSPADM

## 2022-12-15 RX ORDER — FENTANYL CITRATE 50 UG/ML
INJECTION, SOLUTION INTRAMUSCULAR; INTRAVENOUS AS NEEDED
Status: DISCONTINUED | OUTPATIENT
Start: 2022-12-15 | End: 2022-12-15 | Stop reason: SURG

## 2022-12-15 RX ORDER — ONDANSETRON 2 MG/ML
4 INJECTION INTRAMUSCULAR; INTRAVENOUS ONCE AS NEEDED
Status: DISCONTINUED | OUTPATIENT
Start: 2022-12-15 | End: 2022-12-15 | Stop reason: HOSPADM

## 2022-12-15 RX ORDER — MIDAZOLAM HYDROCHLORIDE 1 MG/ML
1 INJECTION INTRAMUSCULAR; INTRAVENOUS
Status: DISCONTINUED | OUTPATIENT
Start: 2022-12-15 | End: 2022-12-15 | Stop reason: HOSPADM

## 2022-12-15 RX ORDER — HYDROMORPHONE HYDROCHLORIDE 1 MG/ML
0.5 INJECTION, SOLUTION INTRAMUSCULAR; INTRAVENOUS; SUBCUTANEOUS
Status: DISCONTINUED | OUTPATIENT
Start: 2022-12-15 | End: 2022-12-15 | Stop reason: HOSPADM

## 2022-12-15 RX ORDER — ERYTHROMYCIN 5 MG/G
OINTMENT OPHTHALMIC AS NEEDED
Status: DISCONTINUED | OUTPATIENT
Start: 2022-12-15 | End: 2022-12-15 | Stop reason: HOSPADM

## 2022-12-15 RX ADMIN — FENTANYL CITRATE 25 MCG: 0.05 INJECTION, SOLUTION INTRAMUSCULAR; INTRAVENOUS at 13:57

## 2022-12-15 RX ADMIN — PROPOFOL 50 MG: 10 INJECTION, EMULSION INTRAVENOUS at 12:30

## 2022-12-15 RX ADMIN — FENTANYL CITRATE 50 MCG: 50 INJECTION, SOLUTION INTRAMUSCULAR; INTRAVENOUS at 15:10

## 2022-12-15 RX ADMIN — SCOPALAMINE 1 PATCH: 1 PATCH, EXTENDED RELEASE TRANSDERMAL at 10:05

## 2022-12-15 RX ADMIN — PROPOFOL 50 MCG/KG/MIN: 10 INJECTION, EMULSION INTRAVENOUS at 13:48

## 2022-12-15 RX ADMIN — SODIUM CHLORIDE, POTASSIUM CHLORIDE, SODIUM LACTATE AND CALCIUM CHLORIDE 9 ML/HR: 600; 310; 30; 20 INJECTION, SOLUTION INTRAVENOUS at 10:06

## 2022-12-15 RX ADMIN — PROPOFOL 200 MG: 10 INJECTION, EMULSION INTRAVENOUS at 12:18

## 2022-12-15 RX ADMIN — HYDROMORPHONE HYDROCHLORIDE 0.5 MG: 1 INJECTION, SOLUTION INTRAMUSCULAR; INTRAVENOUS; SUBCUTANEOUS at 14:52

## 2022-12-15 RX ADMIN — FENTANYL CITRATE 25 MCG: 0.05 INJECTION, SOLUTION INTRAMUSCULAR; INTRAVENOUS at 14:12

## 2022-12-15 RX ADMIN — LIDOCAINE HYDROCHLORIDE 60 MG: 20 INJECTION, SOLUTION INFILTRATION; PERINEURAL at 12:18

## 2022-12-15 RX ADMIN — SODIUM CHLORIDE, POTASSIUM CHLORIDE, SODIUM LACTATE AND CALCIUM CHLORIDE: 600; 310; 30; 20 INJECTION, SOLUTION INTRAVENOUS at 12:40

## 2022-12-15 RX ADMIN — ONDANSETRON 4 MG: 2 INJECTION INTRAMUSCULAR; INTRAVENOUS at 13:58

## 2022-12-15 RX ADMIN — PROPOFOL 50 MCG/KG/MIN: 10 INJECTION, EMULSION INTRAVENOUS at 12:25

## 2022-12-15 RX ADMIN — FAMOTIDINE 20 MG: 10 INJECTION INTRAVENOUS at 10:05

## 2022-12-15 RX ADMIN — MIDAZOLAM 1 MG: 1 INJECTION, SOLUTION INTRAMUSCULAR; INTRAVENOUS at 10:06

## 2022-12-15 RX ADMIN — PROPOFOL 50 MG: 10 INJECTION, EMULSION INTRAVENOUS at 13:02

## 2022-12-15 RX ADMIN — FENTANYL CITRATE 50 MCG: 50 INJECTION, SOLUTION INTRAMUSCULAR; INTRAVENOUS at 14:39

## 2022-12-15 RX ADMIN — GLYCOPYRROLATE 0.1 MG: 0.2 INJECTION INTRAMUSCULAR; INTRAVENOUS at 12:18

## 2022-12-15 RX ADMIN — PROPOFOL 50 MG: 10 INJECTION, EMULSION INTRAVENOUS at 13:30

## 2022-12-15 RX ADMIN — DEXAMETHASONE SODIUM PHOSPHATE 6 MG: 4 INJECTION, SOLUTION INTRA-ARTICULAR; INTRALESIONAL; INTRAMUSCULAR; INTRAVENOUS; SOFT TISSUE at 12:30

## 2022-12-15 NOTE — ANESTHESIA POSTPROCEDURE EVALUATION
Patient: Waleska Shah    Procedure Summary     Date: 12/15/22 Room / Location: [facility] OR  / [facility] MAIN OR    Anesthesia Start: 1212 Anesthesia Stop: 1432    Procedures:       BILATERAL UPPER EYELID BLEPHAROPLASTY (Bilateral: Eye)      LEFT EXCISION AND REPAIR OF BROW CYST (Left: Head)      BILATERAL LOWER EYELID BLEPHAROPLASTY (Bilateral: Eye) Diagnosis:     Surgeons: Royer Coyle MD Provider: Lupe Calzada MD    Anesthesia Type: MAC ASA Status: 2          Anesthesia Type: MAC    Vitals  Vitals Value Taken Time   /83 12/15/22 1531   Temp 37 °C (98.6 °F) 12/15/22 1430   Pulse 0 12/15/22 1539   Resp     SpO2 94 % 12/15/22 1539   Vitals shown include unvalidated device data.        Post Anesthesia Care and Evaluation    Patient location during evaluation: bedside  Patient participation: complete - patient participated  Level of consciousness: awake and alert  Pain management: adequate    Airway patency: patent  Anesthetic complications: No anesthetic complications    Cardiovascular status: acceptable  Respiratory status: acceptable  Hydration status: acceptable    Comments: /94   Pulse 81   Temp 37 °C (98.6 °F) (Oral)   Resp 14   LMP 04/08/2018   SpO2 93%

## 2022-12-15 NOTE — H&P
[facility]   PREOPERATIVE HISTORY AND PHYSICAL    Patient Name:Waleska Shah  : 1965  MRN: [id number]  Primary Care Physician: Erick Sandhu APRN  Date of admission: 12/15/2022    Subjective   Subjective     Chief Complaint: preoperative evaluation    History of Present Illness  Waleska Shah is a 57 y.o. female who presents for preoperative evaluation. She is scheduled for BILATERA UPPER EYELID BLEPHAROPLASTY (Bilateral), LEFT EXCISION AND REPAIR OF BROW CYST (Left)    Review of Systems   Eyes: Positive for visual disturbance.   All other systems reviewed and are negative.       Personal History     Past Medical History:   Diagnosis Date   • Acid reflux     no meds   • Arthritis     rt knee   • Disease of thyroid gland     low   • OAB (overactive bladder)    • Ovarian cyst     LEFT   • Pelvic pain     right side   • PONV (postoperative nausea and vomiting)        Past Surgical History:   Procedure Laterality Date   • ABDOMINOPLASTY     • APPENDECTOMY     • CHOLECYSTECTOMY     • COLONOSCOPY     • DENTAL PROCEDURE     • DIAGNOSTIC LAPAROSCOPY Bilateral 10/17/2016    Procedure: LAPAROSCOPY W/ JAMAL SALPINGECTOMY LT SALPINGO-OOPHORECTOMY;  Surgeon: Bisi Donnelly MD;  Location: [facility];  Service:    • DIAGNOSTIC LAPAROSCOPY Right 2018    Procedure: LAPAROSCOPIC RIGHT OVARIAN CYSTECTOMY LYSIS OF ADHESIONS LEFT ABDOMINAL WALL;  Surgeon: Bisi Donnelly MD;  Location: [facility];  Service: Gynecology   • ENDOSCOPY  2013   • KNEE ARTHROSCOPY Right     X 3   • LAPAROSCOPIC TUBAL LIGATION Bilateral    • LYMPH NODE BIOPSY Left     GROIN       Family History: Her family history includes Colon cancer in her maternal grandfather.     Social History: She  reports that she has been smoking cigarettes. She has a 30.00 pack-year smoking history. She has never used smokeless tobacco. She reports that she does not currently use alcohol. She reports that she does  not use drugs.    Home Medications:  estradiol, tolterodine LA, traMADol, and vitamin D    Allergies:  She is allergic to amoxicillin, hydrocodone, ibuprofen, percocet [oxycodone-acetaminophen], and tylenol with codeine #3 [acetaminophen-codeine].    Objective    Objective     Vitals:    Temp:  [97.3 °F (36.3 °C)] 97.3 °F (36.3 °C)  Heart Rate:  [83] 83  Resp:  [16] 16  BP: (132)/(90) 132/90    Physical Exam  HENT:      Head: Normocephalic.      Mouth/Throat:      Mouth: Mucous membranes are moist.   Eyes:      Comments: [facility]   Cardiovascular:      Rate and Rhythm: Normal rate.      Pulses: Normal pulses.   Pulmonary:      Effort: Pulmonary effort is normal.   Skin:     General: Skin is warm.   Neurological:      Mental Status: She is alert and oriented to person, place, and time.   Psychiatric:         Mood and Affect: Mood normal.         Assessment & Plan   Assessment / Plan     Brief Patient Summary:  Waleska Shah is a 57 y.o. female who presents for preoperative evaluation.    * No Diagnosis Codes entered *    Active Hospital Problems:  There are no active hospital problems to display for this patient.    Plan:   Procedure(s):  BILATERA UPPER EYELID BLEPHAROPLASTY  LEFT EXCISION AND REPAIR OF BROW CYST    The risks, benefits, and alternatives of the procedure including but not limited to bleeding,infection,injury to surrounding structures, loss of vision/eye and risks of the anesthesia were discussed in detail with the patient and questions were answered. No guarantees were made or implied. Informed consent was obtained.    Joyce Lopez MD

## 2022-12-15 NOTE — DISCHARGE INSTRUCTIONS
POST OPERATIVE INSTRUCTIONS  EYELID SURGERY      Patient Name:   Date of Birth:    Most procedures are performed with local anesthesia with intravenous sedation. While post-operative nausea is rare with this type of anesthesia, it is wise to start your diet by drinking clear liquids after surgery (e.g., 7-Up, Gatorade, ginger ale, etc.).  If clear liquids are tolerated well without nausea or vomiting, you may advance towards a regular diet.  Avoid “fatty foods” (eg, milk, pizza, hamburgers, etc.) on the day of surgery or as long as nausea persists.    Many eyelid procedures only require Extra Strength Tylenol for postoperative pain control.  For those patients with more extensive eyelid reconstruction, a prescription for a pain reliever is often given.  Patients may choose to take the prescribed pain reliever OR Extra Strength Tylenol, BUT NOT both together.  Unless specifically instructed, aspirin products such as Tammie, Bufferin, Anacin, Excedrin, etc., should be avoided for at least one week after surgery.  This also includes Advil, Nuprin, Motrin and Ibuprofen.  Any other medications (except blood thinners), which you were taking prior to surgery, should be continued on their regular schedule.  Whenever lying down or sleeping, keep your head elevated on 2 or 3 pillows such that your head is always elevated above the level of your chest.    Apply cold compress to surgical site as much as possible for 2 to3 days following surgery.  A folded washcloth soaked in ice-cold water and wrung out works well for this.  A bag of frozen peas also works well as it is lightweight but molds to the eye.  Do not apply ice directly to the skin.  A small tube of eye ointment should be provided after surgery.  This is to be gently applied to the stitches twice daily.  If the eyes feel irritated, the ointment is safe to use in the eye for lubrication.  This will likely result in blurred vision but will go away once the ointment is  stopped.  EXCEPTION:  If a patch is taped over the eye, do NOT apply cold compresses or ointment.  Leave the patch undisturbed.  A physician will remove the patch at your first post-operative visit.  If your surgery was done on an outpatient basis, you should receive a phone call the day after surgery to check on your progress and to arrange for a post-operative clinic appointment.  The first post-operative visit will be one to two weeks after surgery to check the incisions and remove sutures if necessary.  A second post-operative visit six to eight weeks after surgery will assess the final surgical result.  The following problems should be reported to the office as soon as possible:  Continuous, brisk bleeding.  Please note that some oozing or drainage is common following a surgical procedure.  Do not try to clean dried blood from the surgical site.   This will likely only create more bleeding.  Temperature (fever) over 101?F.  Excessive pain at surgical site not relieved by the pain medication, especially if associated with protrusion of the eyeball.  Sudden loss of vision.  Please note that some blurriness is expected after surgery due to the ointment used around the eyes.  All patients should be able to check their vision even with eyelid swelling.  Double vision      If a problem should arise or you have a question, I can be reached at any time of the day or week by calling [phone].  I hope that your surgery experience with us is a positive one.  Please contact my office with any questions.  Sincerely,  Schuyler Tejeda MD, FACS MD Cuco Armando MD           Scopolamine Patch  This patch has been applied to the skin behind one of your ears.  It may stay in place up to 24 hours. You may remove it at any time after your surgery; however, it should be removed after you are up and walking around the next day.  This medicine reduces stomach upset. Side effects may include: dry mouth,  dizziness, sleepiness, constipation, or upset stomach.  An allergy would show up as: a rash, itching, wheezing or shortness of breath.  Follow these instructions:  Do not drink alcohol, drive or operate machinery while taking this medicine.  Wear only 1 patch at a time. You can leave the patch on for up to 24 hours.  When you remove the patch, fold it in half with the sticky sides together and throw it away. Wash your hands and the area under the patch.  Do not touch your eye with your hand if it has touched the patch.  Wash your hands well before and after touching the patch.  Sit or stand slowly to avoid dizziness.  Call your doctor if you have:  Any sign of allergy  No relief  Trouble passing urine  Any new or severe symptoms

## 2022-12-15 NOTE — ANESTHESIA PROCEDURE NOTES
Airway  Urgency: elective    Date/Time: 12/15/2022 12:20 PM  Airway not difficult    General Information and Staff    Patient location during procedure: OR  Anesthesiologist: Lupe Calzada MD  CRNA/CAA: Bisi Carvalho CRNA    Indications and Patient Condition  Indications for airway management: airway protection    Preoxygenated: yes  MILS maintained throughout  Mask difficulty assessment: 1 - vent by mask    Final Airway Details  Final airway type: supraglottic airway      Successful airway: unique  Size 4     Number of attempts at approach: 1  Assessment: lips, teeth, and gum same as pre-op and atraumatic intubation

## 2022-12-15 NOTE — OP NOTE
OPERATIVE NOTE    Patient Identification:  Name: Waleska Shah  Age: 57 y.o.  Sex: female  :  1965  MRN: [id number]                                               Preoperative diagnosis: Bilateral upper eyelid dermatochalasis, bilateral lower eyelid dermatochalasis, left brow lipomatous mass - 1cm  Postoperative diagnosis: same  Procedure: Bilateral upper eyelid blepharoplasty, Bilateral lower eyelid blepharoplasty, left brow lipoma excision and repair  Surgeon: Royer Coyle MD who was present and scrubbed throughout all critical portions of the operation  Assistants: Joyce Lopez MD  Anesthesia: General  EBL: less than 50cc  Specimens:  * No orders in the log *    Description of the procedure: The patient was taken to the operating room and placed on the table in the supine position, where anesthesia was induced. 2% lidocaine with epinephrine and 0.5% marcaine in a 1:1 fashion was injected over the surgical site, and the patient was prepped and draped in the usual manner for orbitofacial surgery.     Corneal protectors were placed in both eyes.    A 15 Bard-Sacha blade incision was made 8 mm from the eyelash margin, across the entire horizontal extent of the left upper eyelid. A second incision was made 10 mm inferior to the junction of the brow and eyelid, and a pinch test was used to ensure the amount of skin excision was appropriate. The intervening tissue was excised with a 15 Bard-Sacha blade and Christal scissors. Excessive orbicularis tissue was removed. The orbital septum was opened horizontally, and excessive fatty tissue was also removed. Bleeding was controlled with electrocauterization. The skin was then closed with 5-0 fast absorbing suture in an interrupted and running fashion, with care to incorporate the prestarsal orbicularis over the pupil, nasal and temporal limbi respectively. The lid position and contour were examined and found to be satisfactory.     The exact same procedure  was performed over the contralateral upper lid.     Attention was then turned to the lower eyelids. A 15 Bard-Sacha blade incision was made at the right lateral canthus, and sharp dissection was carried laterally a distance of 5 mm.  A second incision was made 2 mm below the eyelash margin, across the entire horizontal extent of the eyelid. A sharp dissection plane was carried out below the orbicularis muscle layer until the entire lower lid was undermined. The orbital septum was opened horizontally, and herniated fatty tissue was removed. The remaining fatty tissue was recontoured to provide the optimal eyelid configuration. Bleeding was controlled with electrocauterization. Next, a lateral canthotomy and cantholysis was performed. Sharp dissection was carried down to the lateral orbital rim periosteum. The inferior ramus of the lateral canthal tendon was identified and severed with sharp dissection. A full-thickness en bloc excision of the lateral aspect of the tarsal plate was carried out with sharp dissection, and bleeding was controlled with electrocauterization. The cut edge of the tarsal plate was advanced to the lateral orbital rim periosteum, where it was sutured with 5-0 vicryl suture to the internal aspect of the lateral orbital rim periosteum. Next, the skin muscle flap was advanced superiorly and temporally, and excessive skin muscle tissue was excised above the lateral canthal incision line. The skin muscle flap was anchored superiorly with a 5-0 vicryl suture anchored to the lateral orbital rim periosteum. The skin was then closed with 5-0 fast absorbing suture.     The exact same procedure was performed on the contralateral lower lid.     Attention was then drawn to left brow subcutaneous soft painless nodule. A vertical incision was made overlying area. A Gomez scissors and pickups were used to dissect around lipomatous mass to excise it. Skin was closed with two horizontal mattress sutures using  5-0 prolene.     The corneal protectors were removed and antibiotic ophthalmic ointment was placed over the surgical site.     The patient was then awakened and taken from the operating room in good condition, having tolerated the procedure well. There were no complications, and the estimated blood loss was less than 50 cc.

## 2022-12-15 NOTE — ANESTHESIA PREPROCEDURE EVALUATION
Anesthesia Evaluation     Patient summary reviewed and Nursing notes reviewed   history of anesthetic complications: PONV               Airway   Mallampati: I  TM distance: >3 FB  Neck ROM: full  No difficulty expected  Dental    (+) lower dentures, upper dentures and implants    Pulmonary - negative pulmonary ROS   Cardiovascular - negative cardio ROS    Rhythm: regular  Rate: normal        Neuro/Psych- negative ROS  GI/Hepatic/Renal/Endo    (+)  GERD,  thyroid problem hypothyroidism    Musculoskeletal     Abdominal    Substance History - negative use     OB/GYN negative ob/gyn ROS         Other   arthritis,                    Anesthesia Plan    ASA 2     MAC     (MAC vs GA with LMA    Upper and lower partial implants    I have reviewed the patient's history with the patient and the chart, including all pertinent laboratory results and imaging. I have explained the risks of anesthesia including but not limited to dental damage, corneal abrasion, nerve injury, MI, stroke, and death. Questions asked and answered. Anesthetic plan discussed with patient and team as indicated. Patient expressed understanding of the above.  )  intravenous induction     Anesthetic plan, risks, benefits, and alternatives have been provided, discussed and informed consent has been obtained with: patient.        CODE STATUS:

## 2023-01-06 ENCOUNTER — OFFICE VISIT (OUTPATIENT)
Dept: CARDIOLOGY | Facility: CLINIC | Age: 58
End: 2023-01-06
Payer: OTHER GOVERNMENT

## 2023-01-06 VITALS
WEIGHT: 145 LBS | HEIGHT: 66 IN | BODY MASS INDEX: 23.3 KG/M2 | HEART RATE: 77 BPM | DIASTOLIC BLOOD PRESSURE: 79 MMHG | SYSTOLIC BLOOD PRESSURE: 128 MMHG

## 2023-01-06 DIAGNOSIS — I25.10 CORONARY ARTERY CALCIFICATION SEEN ON CT SCAN: Primary | ICD-10-CM

## 2023-01-06 DIAGNOSIS — R06.09 EXERTIONAL DYSPNEA: ICD-10-CM

## 2023-01-06 DIAGNOSIS — R07.89 CHEST PAIN, ATYPICAL: ICD-10-CM

## 2023-01-06 DIAGNOSIS — E78.5 HYPERLIPIDEMIA LDL GOAL <70: ICD-10-CM

## 2023-01-06 PROCEDURE — 93000 ELECTROCARDIOGRAM COMPLETE: CPT | Performed by: INTERNAL MEDICINE

## 2023-01-06 PROCEDURE — 99203 OFFICE O/P NEW LOW 30 MIN: CPT | Performed by: INTERNAL MEDICINE

## 2023-01-06 RX ORDER — ROSUVASTATIN CALCIUM 20 MG/1
20 TABLET, COATED ORAL DAILY
Qty: 90 TABLET | Refills: 3 | Status: ON HOLD | OUTPATIENT
Start: 2023-01-06 | End: 2023-03-06 | Stop reason: SDUPTHER

## 2023-01-06 NOTE — PROGRESS NOTES
Chief Complaint  Establish Care, Chest Pain, and Shortness of Breath    Subjective            Waleska Shah presents to [facility] CARDIOLOGY  History of Present Illness  Ms. Shah is a new patient who presents for initial evaluation today:  -She was referred here by her PCP (LASHANDA Salazar) due to progressively worsening shortness of breath with even mild-moderate physical exertion for at least 6 months.  She states that she can now not even walk up one flight of stairs without experiencing limiting dyspnea and needing to rest for several minutes.  -She also reports atypical chest pain intermittently diffusely throughout her chest.  There is no radiation of her chest pain outside the chest and she has not experienced any associated nausea or palpitations or diaphoresis.  Her episodes of chest pain are not typically triggered by physical exertion.  -No palpitations/tachycardia, orthopnea, PND, peripheral edema, lightheadedness, syncope, or fever/chills reported.  She does experience a frequent, intermittently productive cough (with clear sputum).  -Her previous CT scan of the chest (obtained for lung cancer screening) in October 2022 demonstrated moderate coronary artery calcifications.  I reviewed her CT images, and the bulk of her coronary calcifications appear to be concentrated in the LAD.  -She is a chronic smoker (1 pack per day for 30+ years) and unfortunately is not ready to quit smoking at this time  -She does not report any history of hypertension and her BP was normal at 128/79 today  -She is not a diabetic and does not report a family history of premature coronary artery disease  -Ms. Shah does not report a previously diagnosed history of hyperlipidemia, but her recent LDL level was significantly elevated at 156.  She is agreeable to start high-intensity statin therapy today.  -Her ECG today showed sinus rhythm with probable left atrial enlargement and borderline low  voltage in the precordial leads, but was otherwise unremarkable.  There were no significant changes from her previous study in 2016.     Past Medical History:   Diagnosis Date   • Acid reflux     no meds   • Arthritis     rt knee   • Disease of thyroid gland     low   • OAB (overactive bladder)    • Ovarian cyst     LEFT   • Pelvic pain     right side   • PONV (postoperative nausea and vomiting)        Past Surgical History:   • ABDOMINOPLASTY   • APPENDECTOMY   • BLEPHAROPLASTY    Procedure: BILATERAL UPPER EYELID BLEPHAROPLASTY;  Surgeon: Royer Coyle MD;  Location: [facility];  Service: Ophthalmology;  Laterality: Bilateral;   • BLEPHAROPLASTY    Procedure: BILATERAL LOWER EYELID BLEPHAROPLASTY;  Surgeon: Royer Coyle MD;  Location: [facility];  Service: Ophthalmology;  Laterality: Bilateral;  NEW IMAGE   • CHOLECYSTECTOMY   • COLONOSCOPY   • DENTAL PROCEDURE   • DIAGNOSTIC LAPAROSCOPY    Procedure: LAPAROSCOPY W/ JAMAL SALPINGECTOMY LT SALPINGO-OOPHORECTOMY;  Surgeon: Bisi Donnelly MD;  Location: [facility];  Service:    • DIAGNOSTIC LAPAROSCOPY    Procedure: LAPAROSCOPIC RIGHT OVARIAN CYSTECTOMY LYSIS OF ADHESIONS LEFT ABDOMINAL WALL;  Surgeon: Bisi Donnelly MD;  Location: [facility];  Service: Gynecology   • ENDOSCOPY   • HEAD/NECK LESION/CYST EXCISION    Procedure: LEFT EXCISION AND REPAIR OF BROW CYST;  Surgeon: Royer Coyle MD;  Location: [facility];  Service: Ophthalmology;  Laterality: Left;   • KNEE ARTHROSCOPY    X 3   • LAPAROSCOPIC TUBAL LIGATION   • LYMPH NODE BIOPSY    GROIN       Social History     Tobacco Use   • Smoking status: Every Day     Packs/day: 1.00     Years: 30.00     Pack years: 30.00     Types: Cigarettes   • Smokeless tobacco: Never   Vaping Use   • Vaping Use: Never used   Substance Use Topics   • Alcohol use: Not Currently   • Drug use: No       Family History   Problem Relation Age of Onset   • Colon cancer Maternal  Grandfather         50'S   • Malig Hyperthermia Neg Hx         Current Outpatient Medications on File Prior to Visit   Medication Sig   • erythromycin (ROMYCIN) 5 MG/GM ophthalmic ointment Administer  to both eyes 2 (Two) Times a Day.   • estradiol (ESTRACE) 0.1 MG/GM vaginal cream 1 (One) Time Per Week. SATURDAYS   • tolterodine LA (DETROL LA) 2 MG 24 hr capsule Take 2 mg by mouth Daily. PT HOLDING FOR SURGERY   • traMADol (ULTRAM) 50 MG tablet Take 1 tablet by mouth Every 6 (Six) Hours As Needed for Moderate Pain.   • vitamin D (ERGOCALCIFEROL) 1.25 MG (02003 UT) capsule capsule TAKE 1 CAPSULE BY MOUTH 1 TIME EVERY WEEK (Patient taking differently: Take 50,000 Units by mouth Every 7 (Seven) Days. WEDNESDAYS)   • [DISCONTINUED] traMADol (ULTRAM) 50 MG tablet Take 1 tablet by mouth Every 6 (Six) Hours As Needed for Moderate Pain (1/2 tab).     No current facility-administered medications on file prior to visit.       Allergies   Allergen Reactions   • Amoxicillin Itching   • Hydrocodone GI Intolerance     PT STATES SHE IS BOTHERED ONLY BY \"GENERIC\" HYDROCODONE.  ALSO GETS A HEADACHE AND BLURRED VISION WITH \"GENERIC\" HYDROCODONE   • Ibuprofen Nausea And Vomiting   • Percocet [Oxycodone-Acetaminophen] Itching and Rash   • Tylenol With Codeine #3 [Acetaminophen-Codeine] Nausea And Vomiting and Other (See Comments)     Vision blurry       Review of Systems   Constitutional: Positive for fatigue. Negative for chills, fever and unexpected weight gain.   HENT: Positive for rhinorrhea. Negative for hearing loss, nosebleeds and sore throat.    Eyes: Negative for pain and visual disturbance.   Respiratory: Positive for cough, chest tightness and shortness of breath. Negative for wheezing.    Cardiovascular: Positive for chest pain. Negative for palpitations and leg swelling.   Gastrointestinal: Negative for abdominal pain, blood in stool and vomiting.   Endocrine: Negative for cold intolerance and heat intolerance.    Genitourinary: Negative for dysuria and hematuria.   Musculoskeletal: Positive for arthralgias. Negative for joint swelling and myalgias.   Skin: Negative for color change, pallor and rash.   Neurological: Negative for tremors, syncope, weakness, light-headedness and headache.   Hematological: Negative for adenopathy. Does not bruise/bleed easily.        Objective     /79   Pulse 77   Ht 167.6 cm (66\")   Wt 65.8 kg (145 lb)   BMI 23.40 kg/m²       Physical Exam  Constitutional:       General: She is not in acute distress.     Appearance: Normal appearance.   HENT:      Head: Atraumatic.      Mouth/Throat:      Mouth: Mucous membranes are moist.      Pharynx: Oropharynx is clear. No oropharyngeal exudate.   Eyes:      General: No scleral icterus.     Conjunctiva/sclera: Conjunctivae normal.   Neck:      Vascular: No carotid bruit or JVD.   Cardiovascular:      Rate and Rhythm: Normal rate and regular rhythm.  No extrasystoles are present.     Chest Wall: PMI is not displaced.      Pulses:           Radial pulses are 2+ on the right side and 2+ on the left side.      Heart sounds: S1 normal and S2 normal. No murmur heard.    No friction rub. No gallop. No S3 sounds.   Pulmonary:      Effort: Pulmonary effort is normal. Prolonged expiration present. No tachypnea or respiratory distress.      Breath sounds: Rhonchi present. No decreased breath sounds, wheezing or rales.   Chest:      Chest wall: No tenderness.   Abdominal:      General: Bowel sounds are normal. There is no distension.      Palpations: Abdomen is soft. There is no mass.      Tenderness: There is no abdominal tenderness.   Musculoskeletal:         General: No swelling, tenderness or deformity.      Cervical back: Neck supple. No tenderness.      Right lower leg: No edema.      Left lower leg: No edema.   Skin:     General: Skin is warm and dry.      Coloration: Skin is not jaundiced.      Findings: No erythema or rash.      Nails: There is no  clubbing.   Neurological:      General: No focal deficit present.      Mental Status: She is alert and oriented to person, place, and time.      Motor: No weakness.   Psychiatric:         Mood and Affect: Mood normal.         Behavior: Behavior normal.       Result Review :     The following data was reviewed by: Bj Che MD on 01/06/2023:    CMP    CMP 7/5/22 10/20/22 12/14/22   Glucose 85 72 106 (A)   BUN 15 11 16   Creatinine 0.70 0.93 1.08 (A)   eGFR 102.3 72.3 60.4   Sodium 141 138 143   Potassium 3.9 4.4 4.6   Chloride 103 102 106   Calcium 9.9 10.5 9.9   Total Protein 7.6 7.4    Albumin 4.80 4.60    Globulin 2.8 2.8    Total Bilirubin <0.2 0.2    Alkaline Phosphatase 90 97    AST (SGOT) 13 18    ALT (SGPT) 10 18    Albumin/Globulin Ratio 1.7 1.6    BUN/Creatinine Ratio 21.4 11.8 14.8   Anion Gap 13.0 11.5 11.9   (A) Abnormal value       Comments are available for some flowsheets but are not being displayed.           CBC    CBC 7/5/22 10/20/22 12/14/22   WBC 8.22 7.12 6.75   RBC 4.43 4.71 4.28   Hemoglobin 13.9 14.8 13.0   Hematocrit 40.1 42.8 38.2   MCV 90.5 90.9 89.3   MCH 31.4 31.4 30.4   MCHC 34.7 34.6 34.0   RDW 12.4 12.5 12.2 (A)   Platelets 342 356 297   (A) Abnormal value            Lipid Panel    Lipid Panel 10/20/22   Total Cholesterol 242 (A)   Triglycerides 232 (A)   HDL Cholesterol 53   VLDL Cholesterol 42 (A)   LDL Cholesterol  147 (A)   LDL/HDL Ratio 2.69   (A) Abnormal value              ECG 12 Lead    Date/Time: 1/6/2023 10:35 AM  Performed by: Bj Che MD  Authorized by: Bj Che MD   Comparison: compared with previous ECG from 10/10/2016  Similar to previous ECG  Rhythm: sinus rhythm  Rate: normal  BPM: 77  Conduction: conduction normal  ST Segments: ST segments normal  T Waves: T waves normal  QRS axis: normal  Other findings: left atrial abnormality  Comments: Sinus rhythm with probable left atrial enlargement.  No significant change was observed from the  patient's prior ECG in 2016.        Assessment and Plan      Diagnoses and all orders for this visit:    1. Coronary artery calcification seen on CT scan (Primary)  -     Stress Test With Myocardial Perfusion One Day; Future  -     rosuvastatin (CRESTOR) 20 MG tablet; Take 1 tablet by mouth Daily.  Dispense: 90 tablet; Refill: 3  -     ECG 12 Lead  -     Lipid Panel; Future  -     Comprehensive Metabolic Panel; Future    2. Exertional dyspnea  -     Stress Test With Myocardial Perfusion One Day; Future  -     Adult Transthoracic Echo Complete w/ Color, Spectral and Contrast if necessary per protocol; Future  -     ECG 12 Lead  -     Full Pulmonary Function Test With Bronchodilator; Future    3. Chest pain, atypical  -     Stress Test With Myocardial Perfusion One Day; Future  -     Adult Transthoracic Echo Complete w/ Color, Spectral and Contrast if necessary per protocol; Future  -     ECG 12 Lead    4. Hyperlipidemia LDL goal <70  -     rosuvastatin (CRESTOR) 20 MG tablet; Take 1 tablet by mouth Daily.  Dispense: 90 tablet; Refill: 3  -     Lipid Panel; Future  -     Comprehensive Metabolic Panel; Future    -Moderate coronary artery calcifications noted on recent CT scan:  I will obtain a nuclear stress test for further evaluation of the hemodynamic significance of these lesions and for cardiovascular risk stratification.  Start high-intensity statin therapy with rosuvastatin 20 mg nightly.  We will plan to repeat a fasting lipid panel and a CMP in 2-3 months.  It was recommended that she start antiplatelet therapy with aspirin 81 mg daily, and she expressed willingness to do so.    -Progressive exertional dyspnea:  We will obtain a nuclear stress test, as her symptoms may represent an anginal equivalent.  Check PFTs for evaluation of suspected emphysema/COPD.  Will also obtain an echocardiogram to assess her global LV systolic and diastolic function and rule out structural pathology or a pericardial  effusion.    -Atypical chest pain:  Will proceed with a stress test and echo, as detailed above.    -Hyperlipidemia:  LDL goal <70 (given her known CAD), recent LDL = 147.  Start rosuvastatin 20 mg nightly and I will plant to repeat a lipid panel in 2-3 months.  She was counseled regarding the potential side effects of rosuvastatin and was told to call our office if she experiences any significant problems after starting this medication.        Follow Up     No follow-ups on file.    Patient was given instructions and counseling regarding her condition or for health maintenance advice. Please see specific information pulled into the AVS if appropriate.     Waleska Shah  reports that she has been smoking cigarettes. She has a 30.00 pack-year smoking history. She has never used smokeless tobacco.  I have educated her on the risk of diseases from using tobacco products such as cancer, COPD and heart disease.     I advised her to quit and she is not ready to quit at present.      Bj Che MD, FACC, [facility]  Interventional Cardiology

## 2023-01-24 ENCOUNTER — HOSPITAL ENCOUNTER (OUTPATIENT)
Dept: RESPIRATORY THERAPY | Facility: HOSPITAL | Age: 58
Discharge: HOME | End: 2023-01-24
Admitting: INTERNAL MEDICINE
Payer: OTHER GOVERNMENT

## 2023-01-24 DIAGNOSIS — R06.09 EXERTIONAL DYSPNEA: ICD-10-CM

## 2023-01-24 PROCEDURE — 94726 PLETHYSMOGRAPHY LUNG VOLUMES: CPT | Performed by: INTERNAL MEDICINE

## 2023-01-24 PROCEDURE — 94640 AIRWAY INHALATION TREATMENT: CPT

## 2023-01-24 PROCEDURE — 94729 DIFFUSING CAPACITY: CPT

## 2023-01-24 PROCEDURE — 94060 EVALUATION OF WHEEZING: CPT

## 2023-01-24 PROCEDURE — 94060 EVALUATION OF WHEEZING: CPT | Performed by: INTERNAL MEDICINE

## 2023-01-24 PROCEDURE — 94726 PLETHYSMOGRAPHY LUNG VOLUMES: CPT

## 2023-01-24 PROCEDURE — 94729 DIFFUSING CAPACITY: CPT | Performed by: INTERNAL MEDICINE

## 2023-01-24 RX ORDER — ALBUTEROL SULFATE 2.5 MG/3ML
2.5 SOLUTION RESPIRATORY (INHALATION) ONCE
Status: COMPLETED | OUTPATIENT
Start: 2023-01-24 | End: 2023-01-24

## 2023-01-24 RX ADMIN — ALBUTEROL SULFATE 2.5 MG: 2.5 SOLUTION RESPIRATORY (INHALATION) at 14:56

## 2023-01-30 ENCOUNTER — TELEPHONE (OUTPATIENT)
Dept: CARDIOLOGY | Facility: CLINIC | Age: 58
End: 2023-01-30
Payer: OTHER GOVERNMENT

## 2023-01-30 NOTE — TELEPHONE ENCOUNTER
Spoke with pt about results and plan , she had no questions but pt did stated that she will not stop smoking.

## 2023-01-30 NOTE — TELEPHONE ENCOUNTER
----- Message from Allie Fregoso sent at 1/30/2023  8:22 AM EST -----    ----- Message -----  From: Liset Meneses APRN  Sent: 1/29/2023   5:02 PM EST  To: Allie Fregoso    Pulmonary function testing are suggestive of early obstructive airway disease.  Highly recommend to stop smoking.  This is likely at least part of what is creating shortness of breath, however we still need to complete your echocardiogram and stress testing.

## 2023-02-22 ENCOUNTER — HOSPITAL ENCOUNTER (OUTPATIENT)
Dept: NUCLEAR MEDICINE | Facility: HOSPITAL | Age: 58
Discharge: HOME | End: 2023-02-22
Payer: OTHER GOVERNMENT

## 2023-02-22 ENCOUNTER — HOSPITAL ENCOUNTER (OUTPATIENT)
Dept: CARDIOLOGY | Facility: HOSPITAL | Age: 58
Discharge: HOME | End: 2023-02-22
Admitting: INTERNAL MEDICINE
Payer: OTHER GOVERNMENT

## 2023-02-22 DIAGNOSIS — R06.09 EXERTIONAL DYSPNEA: ICD-10-CM

## 2023-02-22 DIAGNOSIS — R07.89 CHEST PAIN, ATYPICAL: ICD-10-CM

## 2023-02-22 DIAGNOSIS — I25.10 CORONARY ARTERY CALCIFICATION SEEN ON CT SCAN: ICD-10-CM

## 2023-02-22 LAB
AORTIC DIMENSIONLESS INDEX: 0.94 (DI)
BH CV ECHO MEAS - AO MAX PG: 6 MMHG
BH CV ECHO MEAS - AO MEAN PG: 3 MMHG
BH CV ECHO MEAS - AO ROOT DIAM: 3 CM
BH CV ECHO MEAS - AO V2 MAX: 120 CM/SEC
BH CV ECHO MEAS - AO V2 VTI: 24.7 CM
BH CV ECHO MEAS - EDV(MOD-SP2): 47 ML
BH CV ECHO MEAS - EDV(MOD-SP4): 46 ML
BH CV ECHO MEAS - EF(MOD-BP): 68 %
BH CV ECHO MEAS - EF(MOD-SP4): 70 %
BH CV ECHO MEAS - ESV(MOD-SP2): 17 ML
BH CV ECHO MEAS - ESV(MOD-SP4): 14 ML
BH CV ECHO MEAS - IVSD: 1.2 CM
BH CV ECHO MEAS - LA DIMENSION: 2.9 CM
BH CV ECHO MEAS - LAT PEAK E' VEL: 12 CM/SEC
BH CV ECHO MEAS - LV MAX PG: 5 MMHG
BH CV ECHO MEAS - LV MEAN PG: 3 MMHG
BH CV ECHO MEAS - LV V1 MAX: 116 CM/SEC
BH CV ECHO MEAS - LV V1 VTI: 23.2 CM
BH CV ECHO MEAS - LVIDD: 3.7 CM
BH CV ECHO MEAS - LVIDS: 2.3 CM
BH CV ECHO MEAS - LVOT DIAM: 2 CM
BH CV ECHO MEAS - LVPWD: 1.1 CM
BH CV ECHO MEAS - MED PEAK E' VEL: 10.6 CM/SEC
BH CV ECHO MEAS - MV A MAX VEL: 89.6 CM/SEC
BH CV ECHO MEAS - MV DEC TIME: 248 MSEC
BH CV ECHO MEAS - MV E MAX VEL: 99 CM/SEC
BH CV ECHO MEAS - MV E/A: 1.1
BH CV ECHO MEAS - MV MAX PG: 5 MMHG
BH CV ECHO MEAS - MV MEAN PG: 3 MMHG
BH CV ECHO MEAS - MV V2 VTI: 31.1 CM
BH CV ECHO MEAS - RVDD: 2.4 CM
BH CV ECHO MEAS - RVSP: 33 MMHG
BH CV ECHO MEAS - TR MAX PG: 30 MMHG
BH CV ECHO MEAS - TR MAX VEL: 273 CM/SEC
BH CV ECHO MEASUREMENTS AVERAGE E/E' RATIO: 8.76
BH CV IMMEDIATE POST TECH DATA BLOOD PRESSURE: NORMAL MMHG
BH CV IMMEDIATE POST TECH DATA HEART RATE: 133 BPM
BH CV IMMEDIATE POST TECH DATA OXYGEN SATS: 98 %
BH CV REST NUCLEAR ISOTOPE DOSE: 8.5 MCI
BH CV SIX MINUTE RECOVERY TECH DATA BLOOD PRESSURE: NORMAL
BH CV SIX MINUTE RECOVERY TECH DATA HEART RATE: 79 BPM
BH CV SIX MINUTE RECOVERY TECH DATA OXYGEN SATURATION: 98 %
BH CV STRESS BP STAGE 1: NORMAL
BH CV STRESS BP STAGE 2: NORMAL
BH CV STRESS BP STAGE 3: NORMAL
BH CV STRESS BP STAGE 4: NORMAL
BH CV STRESS BP STAGE 5: NORMAL
BH CV STRESS DOSE DOBUTAMINE STAGE 1: 5
BH CV STRESS DOSE DOBUTAMINE STAGE 2: 10
BH CV STRESS DOSE DOBUTAMINE STAGE 3: 20
BH CV STRESS DOSE DOBUTAMINE STAGE 4: 30
BH CV STRESS DOSE DOBUTAMINE STAGE 5: 40
BH CV STRESS DURATION MIN STAGE 1: 2
BH CV STRESS DURATION MIN STAGE 2: 2
BH CV STRESS DURATION MIN STAGE 3: 2
BH CV STRESS DURATION MIN STAGE 4: 2
BH CV STRESS DURATION MIN STAGE 5: 2
BH CV STRESS DURATION SEC STAGE 1: 0
BH CV STRESS DURATION SEC STAGE 2: 0
BH CV STRESS DURATION SEC STAGE 3: 0
BH CV STRESS DURATION SEC STAGE 4: 0
BH CV STRESS DURATION SEC STAGE 5: 0
BH CV STRESS HR STAGE 1: 66
BH CV STRESS HR STAGE 2: 64
BH CV STRESS HR STAGE 3: 75
BH CV STRESS HR STAGE 4: 96
BH CV STRESS HR STAGE 5: 136
BH CV STRESS NUCLEAR ISOTOPE DOSE: 31 MCI
BH CV STRESS O2 STAGE 1: 98
BH CV STRESS O2 STAGE 2: 98
BH CV STRESS O2 STAGE 3: 98
BH CV STRESS O2 STAGE 4: 98
BH CV STRESS O2 STAGE 5: 98
BH CV STRESS PROTOCOL 1: NORMAL
BH CV STRESS RECOVERY BP: NORMAL MMHG
BH CV STRESS RECOVERY HR: 79 BPM
BH CV STRESS RECOVERY O2: 98 %
BH CV STRESS STAGE 1: 1
BH CV STRESS STAGE 2: 2
BH CV STRESS STAGE 3: 3
BH CV STRESS STAGE 4: 4
BH CV STRESS STAGE 5: 5
BH CV THREE MINUTE POST TECH DATA BLOOD PRESSURE: NORMAL MMHG
BH CV THREE MINUTE POST TECH DATA HEART RATE: 90 BPM
BH CV THREE MINUTE POST TECH DATA OXYGEN SATURATION: 97 %
LEFT ATRIUM VOLUME INDEX: 11.1 ML/M2
LEFT ATRIUM VOLUME: 19.3 ML
LV EF NUC BP: 66 %
MAXIMAL PREDICTED HEART RATE: 164 BPM
MAXIMAL PREDICTED HEART RATE: 164 BPM
PERCENT MAX PREDICTED HR: 82.93 %
STRESS BASELINE BP: NORMAL MMHG
STRESS BASELINE HR: 66 BPM
STRESS O2 SAT REST: 98 %
STRESS PERCENT HR: 98 %
STRESS POST O2 SAT PEAK: 98 %
STRESS POST PEAK BP: NORMAL MMHG
STRESS POST PEAK HR: 136 BPM
STRESS TARGET HR: 139 BPM
STRESS TARGET HR: 139 BPM

## 2023-02-22 PROCEDURE — 93306 TTE W/DOPPLER COMPLETE: CPT

## 2023-02-22 PROCEDURE — 25010000002 DOBUTAMINE PER 250 MG: Performed by: INTERNAL MEDICINE

## 2023-02-22 PROCEDURE — 78452 HT MUSCLE IMAGE SPECT MULT: CPT | Performed by: INTERNAL MEDICINE

## 2023-02-22 PROCEDURE — 0 TECHNETIUM TETROFOSMIN KIT: Performed by: INTERNAL MEDICINE

## 2023-02-22 PROCEDURE — A9502 TC99M TETROFOSMIN: HCPCS | Performed by: INTERNAL MEDICINE

## 2023-02-22 PROCEDURE — 93018 CV STRESS TEST I&R ONLY: CPT | Performed by: INTERNAL MEDICINE

## 2023-02-22 PROCEDURE — 93016 CV STRESS TEST SUPVJ ONLY: CPT | Performed by: NURSE PRACTITIONER

## 2023-02-22 PROCEDURE — 93017 CV STRESS TEST TRACING ONLY: CPT

## 2023-02-22 PROCEDURE — 78452 HT MUSCLE IMAGE SPECT MULT: CPT

## 2023-02-22 PROCEDURE — 93306 TTE W/DOPPLER COMPLETE: CPT | Performed by: INTERNAL MEDICINE

## 2023-02-22 RX ORDER — DOBUTAMINE HYDROCHLORIDE 200 MG/100ML
2-20 INJECTION INTRAVENOUS
Status: DISCONTINUED | OUTPATIENT
Start: 2023-02-22 | End: 2023-02-22

## 2023-02-22 RX ADMIN — TETROFOSMIN 1 DOSE: 1.38 INJECTION, POWDER, LYOPHILIZED, FOR SOLUTION INTRAVENOUS at 08:08

## 2023-02-22 RX ADMIN — DOBUTAMINE HYDROCHLORIDE 5 MCG/KG/MIN: 200 INJECTION INTRAVENOUS at 10:45

## 2023-02-22 RX ADMIN — TETROFOSMIN 1 DOSE: 1.38 INJECTION, POWDER, LYOPHILIZED, FOR SOLUTION INTRAVENOUS at 10:48

## 2023-02-23 ENCOUNTER — PREP FOR SURGERY (OUTPATIENT)
Dept: OTHER | Facility: HOSPITAL | Age: 58
End: 2023-02-23
Payer: OTHER GOVERNMENT

## 2023-02-23 DIAGNOSIS — R06.09 EXERTIONAL DYSPNEA: Primary | ICD-10-CM

## 2023-02-23 DIAGNOSIS — R07.9 CHEST PAIN, UNSPECIFIED TYPE: ICD-10-CM

## 2023-02-23 DIAGNOSIS — I25.10 CORONARY ARTERY CALCIFICATION SEEN ON CT SCAN: ICD-10-CM

## 2023-02-23 DIAGNOSIS — R94.39 ABNORMAL STRESS TEST: ICD-10-CM

## 2023-02-23 RX ORDER — SODIUM CHLORIDE 0.9 % (FLUSH) 0.9 %
10 SYRINGE (ML) INJECTION EVERY 12 HOURS SCHEDULED
Status: CANCELLED | OUTPATIENT
Start: 2023-02-23

## 2023-02-23 RX ORDER — SODIUM CHLORIDE 0.9 % (FLUSH) 0.9 %
10 SYRINGE (ML) INJECTION AS NEEDED
Status: CANCELLED | OUTPATIENT
Start: 2023-02-23

## 2023-02-23 RX ORDER — SODIUM CHLORIDE 9 MG/ML
40 INJECTION, SOLUTION INTRAVENOUS AS NEEDED
Status: CANCELLED | OUTPATIENT
Start: 2023-02-23

## 2023-02-28 ENCOUNTER — TELEPHONE (OUTPATIENT)
Dept: CARDIOLOGY | Facility: CLINIC | Age: 58
End: 2023-02-28
Payer: OTHER GOVERNMENT

## 2023-02-28 NOTE — TELEPHONE ENCOUNTER
I spoke to patient and gave an arrival time of 6:30 on 03/06/23 for [facility] as well as all other instructions.

## 2023-03-03 NOTE — PRE-PROCEDURE INSTRUCTIONS
PATIENT INSTRUCTED TO BE:    - NPO AFTER MIDNIGHT EXCEPT CAN HAVE SIPS OF WATER WITH HIS MEDICATION PRIOR TO PROCEDURE    -  INSTRUCTED NO LOTIONS, JEWELRY, PIERCINGS, OR DEODORANT DAY OF THE PROCEDURE    - INSTRUCTED TO TAKE THE FOLLOWING MEDICATIONS THE DAY OF SURGERY:       PT REPORTED HAD STOPPED TAKING ALL OF HER MEDS A WEEK AGO ON HER OWN AND WOULD NOT BE TAKING ANYTHING MORNING OF    - INSTRUCTED PT TO FOLLOW ANY INSTRUCTIONS GIVEN BY HIS CARDIOLOGIST.    - INFORMED PT THEY ATTEMPT RADIAL APPROACH FIRST IF UNABLE TO PERFORM CATH WITH THAT APPROACH THEY WILL DO A FEMORAL APPROACH.  ALSO, INFORMED PT THEY WILL BE ON BEDREST POSTOP.  IF THE SURGEON FEELS  AN INTERVENTION OR STENTS NEEDS TO BE PLACED, HE/SHE WILL STAY OVER NIGHT FOR OBSERVATION AND MONITORING.     - INFORMED THE PATIENT HE CAN HAVE TWO VISITORS WITH HIM THE DAY OF THE PROCEDURE    - INSTRUCTED PT TO PARK IN THE PARKING GARAGE, ENTER THE HOSPITAL THRU ENTRANCE A AND  CHECK IN AT THE MAIN REGISTRATION DESK, AFTER REGISTRATION PT WILL BE TAKEN THE THE PREOP AREA FOR HIS PROCEDURE.    -ARRIVAL TIME GIVEN BY CARDIOLOGIST OFFICE, INFORMED PT IF ARRIVAL TIME CHANGES OR ADJUSTMENTS NEED TO BE MADE IN THEIR ARRIVAL TIME, HE/SHE WOULD RECEIVE A CALL.      - PATIENT VERBALIZED UNDERSTANDING

## 2023-03-04 ENCOUNTER — LAB (OUTPATIENT)
Dept: LAB | Facility: HOSPITAL | Age: 58
End: 2023-03-04
Payer: OTHER GOVERNMENT

## 2023-03-04 DIAGNOSIS — E78.5 HYPERLIPIDEMIA LDL GOAL <70: ICD-10-CM

## 2023-03-04 DIAGNOSIS — I25.10 CORONARY ARTERY CALCIFICATION SEEN ON CT SCAN: ICD-10-CM

## 2023-03-04 LAB
ALBUMIN SERPL-MCNC: 4.9 G/DL (ref 3.5–5.2)
ALBUMIN/GLOB SERPL: 1.6 G/DL
ALP SERPL-CCNC: 115 U/L (ref 39–117)
ALT SERPL W P-5'-P-CCNC: 11 U/L (ref 1–33)
ANION GAP SERPL CALCULATED.3IONS-SCNC: 11.8 MMOL/L (ref 5–15)
AST SERPL-CCNC: 14 U/L (ref 1–32)
BASOPHILS # BLD AUTO: 0.07 10*3/MM3 (ref 0–0.2)
BASOPHILS NFR BLD AUTO: 1 % (ref 0–1.5)
BILIRUB SERPL-MCNC: 0.4 MG/DL (ref 0–1.2)
BUN SERPL-MCNC: 16 MG/DL (ref 6–20)
BUN/CREAT SERPL: 19.3 (ref 7–25)
CALCIUM SPEC-SCNC: 10.3 MG/DL (ref 8.6–10.5)
CHLORIDE SERPL-SCNC: 105 MMOL/L (ref 98–107)
CHOLEST SERPL-MCNC: 250 MG/DL (ref 0–200)
CO2 SERPL-SCNC: 26.2 MMOL/L (ref 22–29)
CREAT SERPL-MCNC: 0.83 MG/DL (ref 0.57–1)
DEPRECATED RDW RBC AUTO: 47.4 FL (ref 37–54)
EGFRCR SERPLBLD CKD-EPI 2021: 82.9 ML/MIN/1.73
EOSINOPHIL # BLD AUTO: 0.09 10*3/MM3 (ref 0–0.4)
EOSINOPHIL NFR BLD AUTO: 1.3 % (ref 0.3–6.2)
ERYTHROCYTE [DISTWIDTH] IN BLOOD BY AUTOMATED COUNT: 13.8 % (ref 12.3–15.4)
GLOBULIN UR ELPH-MCNC: 3 GM/DL
GLUCOSE SERPL-MCNC: 96 MG/DL (ref 65–99)
HCT VFR BLD AUTO: 46.2 % (ref 34–46.6)
HDLC SERPL-MCNC: 54 MG/DL (ref 40–60)
HGB BLD-MCNC: 15.4 G/DL (ref 12–15.9)
IMM GRANULOCYTES # BLD AUTO: 0.02 10*3/MM3 (ref 0–0.05)
IMM GRANULOCYTES NFR BLD AUTO: 0.3 % (ref 0–0.5)
INR PPP: 0.92 (ref 0.86–1.15)
LDLC SERPL CALC-MCNC: 159 MG/DL (ref 0–100)
LDLC/HDLC SERPL: 2.88 {RATIO}
LYMPHOCYTES # BLD AUTO: 2.73 10*3/MM3 (ref 0.7–3.1)
LYMPHOCYTES NFR BLD AUTO: 38 % (ref 19.6–45.3)
MCH RBC QN AUTO: 31.2 PG (ref 26.6–33)
MCHC RBC AUTO-ENTMCNC: 33.3 G/DL (ref 31.5–35.7)
MCV RBC AUTO: 93.5 FL (ref 79–97)
MONOCYTES # BLD AUTO: 0.44 10*3/MM3 (ref 0.1–0.9)
MONOCYTES NFR BLD AUTO: 6.1 % (ref 5–12)
NEUTROPHILS NFR BLD AUTO: 3.84 10*3/MM3 (ref 1.7–7)
NEUTROPHILS NFR BLD AUTO: 53.3 % (ref 42.7–76)
NRBC BLD AUTO-RTO: 0 /100 WBC (ref 0–0.2)
PLATELET # BLD AUTO: 349 10*3/MM3 (ref 140–450)
PMV BLD AUTO: 9.6 FL (ref 6–12)
POTASSIUM SERPL-SCNC: 4.4 MMOL/L (ref 3.5–5.2)
PROT SERPL-MCNC: 7.9 G/DL (ref 6–8.5)
PROTHROMBIN TIME: 12.5 SECONDS (ref 11.8–14.9)
RBC # BLD AUTO: 4.94 10*6/MM3 (ref 3.77–5.28)
SODIUM SERPL-SCNC: 143 MMOL/L (ref 136–145)
TRIGL SERPL-MCNC: 202 MG/DL (ref 0–150)
VLDLC SERPL-MCNC: 37 MG/DL (ref 5–40)
WBC NRBC COR # BLD: 7.19 10*3/MM3 (ref 3.4–10.8)

## 2023-03-04 PROCEDURE — 80061 LIPID PANEL: CPT

## 2023-03-04 PROCEDURE — 80053 COMPREHEN METABOLIC PANEL: CPT

## 2023-03-04 PROCEDURE — 36415 COLL VENOUS BLD VENIPUNCTURE: CPT

## 2023-03-04 PROCEDURE — 85610 PROTHROMBIN TIME: CPT | Performed by: FAMILY MEDICINE

## 2023-03-04 PROCEDURE — 85025 COMPLETE CBC W/AUTO DIFF WBC: CPT | Performed by: FAMILY MEDICINE

## 2023-03-06 ENCOUNTER — HOSPITAL ENCOUNTER (OUTPATIENT)
Facility: HOSPITAL | Age: 58
Discharge: HOME | End: 2023-03-06
Attending: INTERNAL MEDICINE | Admitting: INTERNAL MEDICINE
Payer: OTHER GOVERNMENT

## 2023-03-06 VITALS
WEIGHT: 145 LBS | HEART RATE: 76 BPM | OXYGEN SATURATION: 99 % | HEIGHT: 67 IN | SYSTOLIC BLOOD PRESSURE: 162 MMHG | DIASTOLIC BLOOD PRESSURE: 86 MMHG | BODY MASS INDEX: 22.76 KG/M2 | TEMPERATURE: 97.9 F | RESPIRATION RATE: 18 BRPM

## 2023-03-06 DIAGNOSIS — R07.9 CHEST PAIN, UNSPECIFIED TYPE: ICD-10-CM

## 2023-03-06 DIAGNOSIS — R06.09 EXERTIONAL DYSPNEA: ICD-10-CM

## 2023-03-06 DIAGNOSIS — I25.10 CORONARY ARTERY CALCIFICATION SEEN ON CT SCAN: ICD-10-CM

## 2023-03-06 DIAGNOSIS — E78.5 HYPERLIPIDEMIA LDL GOAL <70: ICD-10-CM

## 2023-03-06 DIAGNOSIS — R94.39 ABNORMAL STRESS TEST: ICD-10-CM

## 2023-03-06 PROCEDURE — 93005 ELECTROCARDIOGRAM TRACING: CPT | Performed by: INTERNAL MEDICINE

## 2023-03-06 PROCEDURE — 93010 ELECTROCARDIOGRAM REPORT: CPT | Performed by: INTERNAL MEDICINE

## 2023-03-06 PROCEDURE — 25010000002 HEPARIN (PORCINE) PER 1000 UNITS: Performed by: INTERNAL MEDICINE

## 2023-03-06 PROCEDURE — 99152 MOD SED SAME PHYS/QHP 5/>YRS: CPT | Performed by: INTERNAL MEDICINE

## 2023-03-06 PROCEDURE — C1769 GUIDE WIRE: HCPCS | Performed by: INTERNAL MEDICINE

## 2023-03-06 PROCEDURE — 99153 MOD SED SAME PHYS/QHP EA: CPT | Performed by: INTERNAL MEDICINE

## 2023-03-06 PROCEDURE — 25010000002 MIDAZOLAM PER 1 MG: Performed by: INTERNAL MEDICINE

## 2023-03-06 PROCEDURE — 93458 L HRT ARTERY/VENTRICLE ANGIO: CPT | Performed by: INTERNAL MEDICINE

## 2023-03-06 PROCEDURE — 25010000002 FENTANYL CITRATE (PF) 50 MCG/ML SOLUTION: Performed by: INTERNAL MEDICINE

## 2023-03-06 PROCEDURE — 25510000001 IOPAMIDOL PER 1 ML: Performed by: INTERNAL MEDICINE

## 2023-03-06 PROCEDURE — C1894 INTRO/SHEATH, NON-LASER: HCPCS | Performed by: INTERNAL MEDICINE

## 2023-03-06 RX ORDER — ASPIRIN 81 MG/1
81 TABLET ORAL DAILY
Qty: 90 TABLET | Refills: 3 | Status: SHIPPED | OUTPATIENT
Start: 2023-03-06

## 2023-03-06 RX ORDER — VERAPAMIL HYDROCHLORIDE 2.5 MG/ML
INJECTION, SOLUTION INTRAVENOUS
Status: DISCONTINUED | OUTPATIENT
Start: 2023-03-06 | End: 2023-03-06 | Stop reason: HOSPADM

## 2023-03-06 RX ORDER — ROSUVASTATIN CALCIUM 40 MG/1
40 TABLET, COATED ORAL NIGHTLY
Qty: 90 TABLET | Refills: 3 | Status: SHIPPED | OUTPATIENT
Start: 2023-03-06

## 2023-03-06 RX ORDER — SODIUM CHLORIDE 9 MG/ML
40 INJECTION, SOLUTION INTRAVENOUS AS NEEDED
Status: DISCONTINUED | OUTPATIENT
Start: 2023-03-06 | End: 2023-03-06

## 2023-03-06 RX ORDER — SODIUM CHLORIDE 9 MG/ML
100 INJECTION, SOLUTION INTRAVENOUS CONTINUOUS
Status: ACTIVE | OUTPATIENT
Start: 2023-03-06 | End: 2023-03-06

## 2023-03-06 RX ORDER — HEPARIN SODIUM 1000 [USP'U]/ML
INJECTION, SOLUTION INTRAVENOUS; SUBCUTANEOUS
Status: DISCONTINUED | OUTPATIENT
Start: 2023-03-06 | End: 2023-03-06 | Stop reason: HOSPADM

## 2023-03-06 RX ORDER — SODIUM CHLORIDE 0.9 % (FLUSH) 0.9 %
10 SYRINGE (ML) INJECTION AS NEEDED
Status: DISCONTINUED | OUTPATIENT
Start: 2023-03-06 | End: 2023-03-06

## 2023-03-06 RX ORDER — SODIUM CHLORIDE 0.9 % (FLUSH) 0.9 %
10 SYRINGE (ML) INJECTION EVERY 12 HOURS SCHEDULED
Status: DISCONTINUED | OUTPATIENT
Start: 2023-03-06 | End: 2023-03-06

## 2023-03-06 RX ORDER — FENTANYL CITRATE 50 UG/ML
INJECTION, SOLUTION INTRAMUSCULAR; INTRAVENOUS
Status: DISCONTINUED | OUTPATIENT
Start: 2023-03-06 | End: 2023-03-06 | Stop reason: HOSPADM

## 2023-03-06 RX ORDER — LIDOCAINE HYDROCHLORIDE 20 MG/ML
INJECTION, SOLUTION INFILTRATION; PERINEURAL
Status: DISCONTINUED | OUTPATIENT
Start: 2023-03-06 | End: 2023-03-06 | Stop reason: HOSPADM

## 2023-03-06 RX ORDER — ONDANSETRON 4 MG/1
4 TABLET, FILM COATED ORAL EVERY 6 HOURS PRN
Status: DISCONTINUED | OUTPATIENT
Start: 2023-03-06 | End: 2023-03-06 | Stop reason: HOSPADM

## 2023-03-06 RX ORDER — ONDANSETRON 2 MG/ML
4 INJECTION INTRAMUSCULAR; INTRAVENOUS EVERY 6 HOURS PRN
Status: DISCONTINUED | OUTPATIENT
Start: 2023-03-06 | End: 2023-03-06 | Stop reason: HOSPADM

## 2023-03-06 RX ORDER — MIDAZOLAM HYDROCHLORIDE 1 MG/ML
INJECTION INTRAMUSCULAR; INTRAVENOUS
Status: DISCONTINUED | OUTPATIENT
Start: 2023-03-06 | End: 2023-03-06 | Stop reason: HOSPADM

## 2023-03-06 NOTE — PRE-SEDATION DOCUMENTATION
Sedation Plan    ASA 2     Mallampati class: II.    Risks, benefits, and alternatives discussed with patient.    Bj Che MD, FACC, [facility]  3/6/2023

## 2023-03-06 NOTE — H&P
[facility]   HISTORY AND PHYSICAL    Patient Name: Waleska Shah  : 1965  MRN: [id number]  Primary Care Physician:  Erick Sandhu APRN  Date of admission: 3/6/2023    Subjective   Subjective     Chief Complaint: shortness of breath, chest pain    HPI:  Waleska Shah is a 57 y.o. female with moderate coronary artery calcifications per a prior CT scan of the chest, chronic ongoing tobacco abuse, and hyperlipidemia who presents for an outpatient C (and possible PCI if indicated) today.  She had a recent abnormal nuclear stress test concerning for ischemia in two areas, as detailed below.  She continues to have occasional episodes of chest discomfort (frequent not related to physical exertion) and also reports stable chronic exertional dyspnea, but no new symptoms.     Review of Systems   Constitutional: Positive for fatigue. Negative for chills and fever.   HENT: Negative for hearing loss, sore throat and trouble swallowing.    Eyes: Negative for pain and visual disturbance.   Respiratory: Positive for cough and shortness of breath. Negative for chest tightness and wheezing.    Cardiovascular: Positive for chest pain. Negative for palpitations and leg swelling.   Gastrointestinal: Negative for abdominal distention, abdominal pain, blood in stool and vomiting.   Endocrine: Negative for cold intolerance and heat intolerance.   Genitourinary: Negative for dysuria and hematuria.   Musculoskeletal: Positive for arthralgias. Negative for joint swelling and myalgias.   Skin: Negative for color change, pallor and rash.   Neurological: Negative for syncope, speech difficulty, light-headedness and headaches.   Hematological: Negative for adenopathy. Does not bruise/bleed easily.        Personal History     Past Medical History:   Diagnosis Date   • Acid reflux     no meds   • Arthritis     rt knee   • Disease of thyroid gland     low   • Hypothyroidism    • OAB (overactive bladder)    • Pelvic pain      right side   • PONV (postoperative nausea and vomiting)    • SOB (shortness of breath) on exertion        Past Surgical History:   Procedure Laterality Date   • ABDOMINOPLASTY  2016   • APPENDECTOMY  1983   • BLEPHAROPLASTY Bilateral 12/15/2022    Procedure: BILATERAL UPPER EYELID BLEPHAROPLASTY;  Surgeon: Royer Coyle MD;  Location: [facility];  Service: Ophthalmology;  Laterality: Bilateral;   • BLEPHAROPLASTY Bilateral 12/15/2022    Procedure: BILATERAL LOWER EYELID BLEPHAROPLASTY;  Surgeon: Royer Coyle MD;  Location: [facility] OR;  Service: Ophthalmology;  Laterality: Bilateral;  NEW IMAGE   • CHOLECYSTECTOMY  2012   • COLONOSCOPY  2013   • DENTAL PROCEDURE  2008   • DIAGNOSTIC LAPAROSCOPY Bilateral 10/17/2016    Procedure: LAPAROSCOPY W/ JAMAL SALPINGECTOMY LT SALPINGO-OOPHORECTOMY;  Surgeon: Bisi Donnelly MD;  Location: [facility];  Service:    • DIAGNOSTIC LAPAROSCOPY Right 5/7/2018    Procedure: LAPAROSCOPIC RIGHT OVARIAN CYSTECTOMY LYSIS OF ADHESIONS LEFT ABDOMINAL WALL;  Surgeon: Bisi Donnelly MD;  Location: [facility];  Service: Gynecology   • ENDOSCOPY  2013   • HEAD/NECK LESION/CYST EXCISION Left 12/15/2022    Procedure: LEFT EXCISION AND REPAIR OF BROW CYST;  Surgeon: Royer Coyle MD;  Location: [facility];  Service: Ophthalmology;  Laterality: Left;   • KNEE ARTHROSCOPY Right     X 3   • LAPAROSCOPIC TUBAL LIGATION Bilateral 1994   • LYMPH NODE BIOPSY Left 2001    GROIN       Family History: family history includes Colon cancer in her maternal grandfather. Otherwise pertinent FHx was reviewed and not pertinent to current issue.    Social History:  reports that she has been smoking cigarettes. She has a 30.00 pack-year smoking history. She has never used smokeless tobacco. She reports that she does not currently use alcohol. She reports that she does not use drugs.    Home Medications:  estradiol, rosuvastatin, tolterodine LA, traMADol, and  "vitamin D    Allergies:  Allergies   Allergen Reactions   • Amoxicillin Itching   • Hydrocodone GI Intolerance     PT STATES SHE IS BOTHERED ONLY BY \"GENERIC\" HYDROCODONE.  ALSO GETS A HEADACHE AND BLURRED VISION WITH \"GENERIC\" HYDROCODONE   • Ibuprofen Nausea And Vomiting   • Percocet [Oxycodone-Acetaminophen] Itching and Rash   • Tylenol With Codeine #3 [Acetaminophen-Codeine] Nausea And Vomiting and Other (See Comments)     Vision blurry       Objective    Objective     Vitals:   Temp:  [97.9 °F (36.6 °C)] 97.9 °F (36.6 °C)  Heart Rate:  [64] 64  Resp:  [16] 16  BP: (132)/(80) 132/80  Flow (L/min):  [3] 3    Physical Exam  Constitutional:       General: She is not in acute distress.     Appearance: Normal appearance.   HENT:      Head: Atraumatic.      Mouth/Throat:      Mouth: Mucous membranes are moist.      Pharynx: Oropharynx is clear. No oropharyngeal exudate.   Eyes:      General: No scleral icterus.     Conjunctiva/sclera: Conjunctivae normal.   Neck:      Vascular: No carotid bruit or JVD.   Cardiovascular:      Rate and Rhythm: Normal rate and regular rhythm.      Chest Wall: PMI is not displaced.      Pulses:           Radial pulses are 2+ on the right side and 2+ on the left side.      Heart sounds: S1 normal and S2 normal. No murmur heard.    No friction rub. No gallop.   Pulmonary:      Effort: Pulmonary effort is normal. Prolonged expiration present. No tachypnea or respiratory distress.      Breath sounds: Rhonchi present. No wheezing or rales.   Chest:      Chest wall: No tenderness.   Abdominal:      General: Bowel sounds are normal. There is no distension.      Palpations: Abdomen is soft. There is no mass.      Tenderness: There is no abdominal tenderness.   Musculoskeletal:         General: No swelling, tenderness or deformity.      Cervical back: Neck supple. No tenderness.      Right lower leg: No edema.      Left lower leg: No edema.   Skin:     General: Skin is warm and dry.      " Coloration: Skin is not jaundiced.      Findings: No erythema or rash.      Nails: There is no clubbing.   Neurological:      General: No focal deficit present.      Mental Status: She is alert and oriented to person, place, and time.      Motor: No weakness.   Psychiatric:         Mood and Affect: Mood normal.         Behavior: Behavior normal.       Result Review    Result Review:  I have personally reviewed the results from the time of this admission to 3/6/2023 08:16 EST and agree with these findings:  [x]  Laboratory  []  Microbiology  []  Radiology  [x]  EKG/Telemetry   [x]  Cardiology/Vascular   []  Pathology  []  Old records  []  Other:  Most notable findings include: Hgb = 15.4, platelets = 349, LDL = 159, triglycerides = 202, Cr = 0.83, K = 4.4    SPECT Study 2/22/23:  Moderate size, moderate intensity, reversible mid-distal anteroseptal/apical and basal-mid lateral perfusion defects, suggestive of ischemia.  Calculated ejection fraction = 66% with normal left ventricular wall motion throughout. No evidence of transient ischemic dilatation.  Negative dobutamine stress test for ischemia per electrocardiographic criteria.  No evidence of inducible angina.  Frequent isolated PVCs and ventricular couplets were observed during the study, but there was no evidence of arrhythmias.  Per the ACC stress test guidelines, this is an intermediate risk study.      Assessment & Plan   Assessment / Plan     Brief Patient Summary:  Waleska Shah is a 57 y.o. female with:  -Abnormal SPECT study suggestive of mid-distal anteroseptal/apical and basal-mid lateral wall ischemia  -Recurrent atypical chest pain  -Coronary artery calcifications noted on prior CT scan of chest, primarily concentrated in the LAD per my review  -Hyperlipidemia, LDL goal <55  -Borderline hypertension with borderline concentric LVH on recent echo  -Chronic exertional dyspnea with recent mildly abnormal PFTs suggestive of possible early obstructive  airway disease    Active Hospital Problems:  Active Hospital Problems    Diagnosis    • **Exertional dyspnea    • Chest pain    • Coronary artery calcification seen on CT scan    • Abnormal stress test        Plan:   -Will proceed with LHC (and possible PCI if indicated) today for further evaluation, given the patient's ongoing symptoms and recent SPECT results.  The risks (death, heart attack, stroke, loss of limb, infection, arterial dissection, coronary artery perforation and potential pericardial tamponade, severe bleeding and potential need for blood transfusion, renal failure and potential need for permanent dialysis, anaphylaxis) and benefits of the procedure were reviewed in detail with Ms. Shah.  She voiced understanding and wishes to proceed this morning.  We will tentatively plan for a right radial approach.  Continue current medical therapy pending results of the procedure.      DVT prophylaxis:  No DVT prophylaxis order currently exists.    CODE STATUS:    Full code    Electronically signed by Bj Che MD, 03/06/23, 8:16 AM EST.

## 2023-03-06 NOTE — NURSING NOTE
Patient discharged home. Discharge education completed with daughter at bedside. IV removed. Work note provided with restrictions. Patient cant lift more than 5lbs for 3 days.

## 2023-03-10 ENCOUNTER — TELEPHONE (OUTPATIENT)
Dept: CARDIOLOGY | Facility: CLINIC | Age: 58
End: 2023-03-10
Payer: OTHER GOVERNMENT

## 2023-03-10 NOTE — TELEPHONE ENCOUNTER
----- Message from Allie Fregoso sent at 3/10/2023  2:47 PM EST -----    ----- Message -----  From: Liset Meneses APRN  Sent: 3/9/2023   8:23 AM EST  To: Allie Fregoso    Chemistry panel is shows normal electrolytes, renal function, and liver enzymes.  Her cholesterol panel has actually increased slightly.  Is she taking rosuvastatin as prescribed by Dr. Che in January?  If she has been taking this daily then I would recommend we increase to 40 mg, and if she is agreeable please send the prescription in for her.

## 2023-03-13 ENCOUNTER — TELEPHONE (OUTPATIENT)
Dept: CARDIOLOGY | Facility: CLINIC | Age: 58
End: 2023-03-13
Payer: OTHER GOVERNMENT

## 2023-03-14 ENCOUNTER — TELEPHONE (OUTPATIENT)
Dept: CARDIOLOGY | Facility: CLINIC | Age: 58
End: 2023-03-14
Payer: OTHER GOVERNMENT

## 2023-03-14 NOTE — TELEPHONE ENCOUNTER
Pt had stop taking rosuvastatin do to having procedure done so she has started back on current dose.

## 2023-03-15 ENCOUNTER — OFFICE VISIT (OUTPATIENT)
Dept: FAMILY MEDICINE CLINIC | Facility: CLINIC | Age: 58
End: 2023-03-15
Payer: OTHER GOVERNMENT

## 2023-03-15 ENCOUNTER — HOSPITAL ENCOUNTER (OUTPATIENT)
Dept: GENERAL RADIOLOGY | Facility: HOSPITAL | Age: 58
Discharge: HOME | End: 2023-03-15
Admitting: NURSE PRACTITIONER
Payer: OTHER GOVERNMENT

## 2023-03-15 VITALS
BODY MASS INDEX: 22.44 KG/M2 | HEIGHT: 67 IN | TEMPERATURE: 97.7 F | OXYGEN SATURATION: 99 % | SYSTOLIC BLOOD PRESSURE: 152 MMHG | HEART RATE: 84 BPM | DIASTOLIC BLOOD PRESSURE: 84 MMHG | WEIGHT: 143 LBS

## 2023-03-15 DIAGNOSIS — N32.81 OVERACTIVE BLADDER DUE TO PROLAPSE OF FEMALE GENITAL ORGAN: ICD-10-CM

## 2023-03-15 DIAGNOSIS — I25.10 CORONARY ARTERY DISEASE, NON-OCCLUSIVE: ICD-10-CM

## 2023-03-15 DIAGNOSIS — R41.3 MEMORY CHANGES: ICD-10-CM

## 2023-03-15 DIAGNOSIS — N81.9 OVERACTIVE BLADDER DUE TO PROLAPSE OF FEMALE GENITAL ORGAN: ICD-10-CM

## 2023-03-15 DIAGNOSIS — M54.6 CHRONIC RIGHT-SIDED THORACIC BACK PAIN: ICD-10-CM

## 2023-03-15 DIAGNOSIS — G89.29 CHRONIC RIGHT-SIDED THORACIC BACK PAIN: ICD-10-CM

## 2023-03-15 DIAGNOSIS — E78.2 MIXED HYPERLIPIDEMIA: ICD-10-CM

## 2023-03-15 DIAGNOSIS — N62 MACROMASTIA: ICD-10-CM

## 2023-03-15 DIAGNOSIS — R23.9 UNSPECIFIED SKIN CHANGES: ICD-10-CM

## 2023-03-15 DIAGNOSIS — E55.9 VITAMIN D DEFICIENCY: ICD-10-CM

## 2023-03-15 DIAGNOSIS — H25.9 AGE-RELATED CATARACT OF BOTH EYES, UNSPECIFIED AGE-RELATED CATARACT TYPE: ICD-10-CM

## 2023-03-15 DIAGNOSIS — Z09 FOLLOW-UP EXAM, 3-6 MONTHS SINCE PREVIOUS EXAM: Primary | ICD-10-CM

## 2023-03-15 PROCEDURE — 99214 OFFICE O/P EST MOD 30 MIN: CPT | Performed by: NURSE PRACTITIONER

## 2023-03-15 PROCEDURE — 72072 X-RAY EXAM THORAC SPINE 3VWS: CPT

## 2023-03-15 RX ORDER — ERGOCALCIFEROL 1.25 MG/1
50000 CAPSULE ORAL
Qty: 13 CAPSULE | Refills: 1 | Status: SHIPPED | OUTPATIENT
Start: 2023-03-15

## 2023-03-15 RX ORDER — ALBUTEROL SULFATE 90 UG/1
2 AEROSOL, METERED RESPIRATORY (INHALATION) EVERY 4 HOURS PRN
Qty: 8 G | Refills: 2 | Status: SHIPPED | OUTPATIENT
Start: 2023-03-15

## 2023-03-15 NOTE — PROGRESS NOTES
Chief Complaint  Back Pain (Lower right side swelling and pain), Cardiology Update (Pt was treated with Dr. Che), and Leg Tightness (Pt reports Rt calf tightness)    Subjective        Medical History: has a past medical history of Acid reflux, Arthritis, Disease of thyroid gland, Hypothyroidism, OAB (overactive bladder), Pelvic pain, PONV (postoperative nausea and vomiting), and SOB (shortness of breath) on exertion.     Surgical History: has a past surgical history that includes Appendectomy (1983); Lymph node biopsy (Left, 2001); Cholecystectomy (2012); Laparoscopic tubal ligation (Bilateral, 1994); Abdominoplasty (2016); Knee arthroscopy (Right); Dental surgery (2008); Laparoscopy (Bilateral, 10/17/2016); Colonoscopy (2013); Laparoscopy (Right, 5/7/2018); Esophagogastroduodenoscopy (2013); Blepharoplasty (Bilateral, 12/15/2022); Head/Neck Lesion/Cyst Excision (Left, 12/15/2022); Blepharoplasty (Bilateral, 12/15/2022); and Cardiac catheterization (N/A, 3/6/2023).     Family History: family history includes Colon cancer in her maternal grandfather.     Social History: reports that she has been smoking cigarettes. She has a 15.00 pack-year smoking history. She has never used smokeless tobacco. She reports that she does not currently use alcohol. She reports that she does not use drugs.    Waleska Shah presents to [facility]  Back Pain  This is a chronic problem. The current episode started more than 1 year ago. The problem has been waxing and waning since onset. The pain is present in the thoracic spine. The pain is moderate. The pain is the same all the time. The symptoms are aggravated by bending and twisting. Stiffness is present in the morning. Pertinent negatives include no bladder incontinence, bowel incontinence or pelvic pain. Risk factors include poor posture (Macromastia). She has tried NSAIDs and heat for the symptoms. The treatment provided mild relief.     Patient  "is also needing a cardiology update.  Patient currently sees cardiology For exertional dyspnea, unspecific chest pain, coronary calcification seen on the CAT scan and abnormal stress test.  Patient did have a cardiac cath completed on 3/6/2023 that was normal.  We will send over updated referral.    Patient is needing updated referral for ophthalmology.  She is currently seeing ophthalmology for cataract.  She has been monitoring on a regular basis to make sure surgical intervention is not needed.    An updated referral to [facility], GYN, she is currently seeing them for overactive bladder and bladder prolapse.    Patient has a history of macromastia she wears a D D bra size.  It does cause her some thoracic back pain, she states it is mainly on her right side that it pulls her shoulder forward.  She has been bleeding and skin color changes with her bra strap is.  Patient is inquiring whether she would benefit from iron for breast reduction and would like to be referred over to plastic surgery for further work-up and management.    Patient states she feels like she is having memory changes, it is mild in nature but it is concerning to the patient because her mother had neurological issues including stroke and dementia.  Patient states she forgets where she places things in her room or what she was about to say.  She has not gotten lost outside of her home or while driving.    Objective   Vital Signs:   /84 (BP Location: Left arm, Patient Position: Sitting, Cuff Size: Adult)   Pulse 84   Temp 97.7 °F (36.5 °C) (Temporal)   Ht 170.2 cm (67\")   Wt 64.9 kg (143 lb)   SpO2 99%   BMI 22.40 kg/m²       Wt Readings from Last 3 Encounters:   03/15/23 64.9 kg (143 lb)   03/06/23 65.8 kg (145 lb)   01/06/23 65.8 kg (145 lb)        BP Readings from Last 3 Encounters:   03/15/23 152/84   03/06/23 162/86   01/06/23 128/79        BMI is within normal parameters. No other follow-up for BMI required.       Physical " Exam  Vitals reviewed.   Constitutional:       Appearance: Normal appearance. She is well-developed and normal weight.   HENT:      Head: Normocephalic and atraumatic.      Right Ear: External ear normal.      Left Ear: External ear normal.   Eyes:      Conjunctiva/sclera: Conjunctivae normal.      Pupils: Pupils are equal, round, and reactive to light.   Cardiovascular:      Rate and Rhythm: Normal rate and regular rhythm.      Heart sounds: No murmur heard.    No gallop.   Pulmonary:      Effort: Pulmonary effort is normal.      Breath sounds: Normal breath sounds. No wheezing or rhonchi.   Musculoskeletal:      Thoracic back: Tenderness present.   Skin:     General: Skin is warm and dry.      Comments: Complaining of shoulder were bra straps set.  Mild skin discoloration is under her bra straps   Neurological:      Mental Status: She is alert and oriented to person, place, and time.   Psychiatric:         Mood and Affect: Mood and affect normal.         Behavior: Behavior normal.         Thought Content: Thought content normal.         Judgment: Judgment normal.        Result Review :  {The following data was reviewed by LASHANDA Urban on 03/15/2023.  Common labs    Common Labs 10/20/22 10/20/22 10/20/22 12/14/22 12/14/22 3/4/23 3/4/23 3/4/23    1250 1250 1250 1509 1509 0805 0805 0805   Glucose 72    106 (A)  96    BUN 11    16  16    Creatinine 0.93    1.08 (A)  0.83    Sodium 138    143  143    Potassium 4.4    4.6  4.4    Chloride 102    106  105    Calcium 10.5    9.9  10.3    Albumin 4.60      4.9    Total Bilirubin 0.2      0.4    Alkaline Phosphatase 97      115    AST (SGOT) 18      14    ALT (SGPT) 18      11    WBC   7.12 6.75  7.19     Hemoglobin   14.8 13.0  15.4     Hematocrit   42.8 38.2  46.2     Platelets   356 297  349     Total Cholesterol  242 (A)      250 (A)   Triglycerides  232 (A)      202 (A)   HDL Cholesterol  53      54   LDL Cholesterol   147 (A)      159 (A)   (A) Abnormal  value            Data reviewed: Previous cardiology note            Current Outpatient Medications on File Prior to Visit   Medication Sig Dispense Refill   • estradiol (ESTRACE) 0.1 MG/GM vaginal cream 1 (One) Time Per Week. SATURDAYS     • rosuvastatin (CRESTOR) 40 MG tablet Take 1 tablet by mouth Every Night. 90 tablet 3   • tolterodine LA (DETROL LA) 2 MG 24 hr capsule Take 1 capsule by mouth Daily. PT HOLDING FOR SURGERY     • traMADol (ULTRAM) 50 MG tablet Take 1 tablet by mouth Every 6 (Six) Hours As Needed for Moderate Pain. 10 tablet 0   • aspirin 81 MG EC tablet Take 1 tablet by mouth Daily. (Patient not taking: Reported on 3/15/2023) 90 tablet 3     No current facility-administered medications on file prior to visit.        Assessment and Plan  Diagnoses and all orders for this visit:    1. Follow-up exam, 3-6 months since previous exam (Primary)    2. Coronary artery disease, non-occlusive  Comments:  New referral was sent over to cardiology  Orders:  -     Ambulatory Referral to Cardiology    3. Mixed hyperlipidemia  Comments:  We will recheck labs, adjust medication  Orders:  -     Ambulatory Referral to Cardiology    4. Vitamin D deficiency    5. Age-related cataract of both eyes, unspecified age-related cataract type  Comments:  We will replace a new updated  referral  Orders:  -     Ambulatory Referral to Ophthalmology    6. Memory changes  Comments:  We will get CT of head to rule out any acute intracranial abnormalities.  Orders:  -     CT Head Without Contrast; Future    7. Macromastia  Comments:  Referral to plastic surgery for further work-up and management  Orders:  -     Ambulatory Referral to Plastic Surgery    8. Chronic right-sided thoracic back pain  Comments:  We will get x-ray, likely related to the micromastia will refer over to plastics  Orders:  -     XR Spine Thoracic 3 View; Future  -     Ambulatory Referral to Plastic Surgery    9. Unspecified skin  changes  Comments:  Dimpling under her bra strap and skin color changes in her bra strap due to macromastia  Orders:  -     Ambulatory Referral to Plastic Surgery    10. Overactive bladder due to prolapse of female genital organ  Comments:  Will refer back over to urogynecology.  Orders:  -     Ambulatory Referral to Obstetrics / Gynecology    Other orders  -     vitamin D (ERGOCALCIFEROL) 1.25 MG (35549 UT) capsule capsule; Take 1 capsule by mouth Every 7 (Seven) Days. WEDNESDAYS  Dispense: 13 capsule; Refill: 1  -     albuterol sulfate  (90 Base) MCG/ACT inhaler; Inhale 2 puffs Every 4 (Four) Hours As Needed for Wheezing or Shortness of Air.  Dispense: 8 g; Refill: 2        Follow Up   Return in about 3 months (around 6/15/2023) for Recheck.  Patient was given instructions and counseling regarding her condition or for health maintenance advice. Please see specific information pulled into the AVS if appropriate.       Part of this note may be electronic transcription/translation of spoken language to printed text using the Dragon dictation system

## 2023-03-19 LAB
QT INTERVAL: 392 MS
QT INTERVAL: 421 MS

## 2023-03-20 ENCOUNTER — TELEPHONE (OUTPATIENT)
Dept: FAMILY MEDICINE CLINIC | Facility: CLINIC | Age: 58
End: 2023-03-20
Payer: OTHER GOVERNMENT

## 2023-03-20 RX ORDER — LIDOCAINE 50 MG/G
1 PATCH TOPICAL EVERY 24 HOURS
Qty: 30 PATCH | Refills: 3 | Status: SHIPPED | OUTPATIENT
Start: 2023-03-20

## 2023-03-20 NOTE — TELEPHONE ENCOUNTER
Patient called voicing that during last OV with PCP on 03/15/2023, an Rx for pain patches were discussed, however patient did not receive Rx pain patches & that Rx was not received at patient's pharmacy as well.    Patient pharmacy on file, verified and correct.    Patient voiced that she has not had Rx pain patches in the past nor has she ever used Rx pain patches before.    Informed patient that Rx request for pain patches would be proposed to PCP for consideration and approval.    Patient verbalized understanding, no further questions/concerns.

## 2023-04-04 ENCOUNTER — TELEPHONE (OUTPATIENT)
Dept: FAMILY MEDICINE CLINIC | Facility: CLINIC | Age: 58
End: 2023-04-04

## 2023-04-04 NOTE — TELEPHONE ENCOUNTER
Caller: Waleska Shah    Relationship: Self    Best call back number: [phone]    Who are you requesting to speak with (clinical staff, provider,  specific staff member):     What was the call regarding: PATIENT STATES SHE WOULD LIKE TO BE SEEN 4/4 OR 4/5 FOR ONGOING ISSUES WITH HER RIGHT SIDE.    Do you require a callback: YES

## 2023-04-14 ENCOUNTER — HOSPITAL ENCOUNTER (OUTPATIENT)
Dept: CT IMAGING | Facility: HOSPITAL | Age: 58
Discharge: HOME | End: 2023-04-14
Admitting: NURSE PRACTITIONER
Payer: OTHER GOVERNMENT

## 2023-04-14 DIAGNOSIS — R41.3 MEMORY CHANGES: ICD-10-CM

## 2023-04-14 PROCEDURE — 70450 CT HEAD/BRAIN W/O DYE: CPT

## 2023-05-04 ENCOUNTER — OFFICE VISIT (OUTPATIENT)
Dept: FAMILY MEDICINE CLINIC | Facility: CLINIC | Age: 58
End: 2023-05-04
Payer: OTHER GOVERNMENT

## 2023-05-04 VITALS
DIASTOLIC BLOOD PRESSURE: 72 MMHG | HEART RATE: 100 BPM | WEIGHT: 138 LBS | SYSTOLIC BLOOD PRESSURE: 138 MMHG | TEMPERATURE: 98.2 F | BODY MASS INDEX: 21.66 KG/M2 | OXYGEN SATURATION: 97 % | HEIGHT: 67 IN

## 2023-05-04 DIAGNOSIS — Z82.69 FAMILY HISTORY OF SYSTEMIC LUPUS ERYTHEMATOSUS: ICD-10-CM

## 2023-05-04 DIAGNOSIS — G89.29 CHRONIC RIGHT-SIDED LOW BACK PAIN WITHOUT SCIATICA: Primary | ICD-10-CM

## 2023-05-04 DIAGNOSIS — M54.50 CHRONIC RIGHT-SIDED LOW BACK PAIN WITHOUT SCIATICA: Primary | ICD-10-CM

## 2023-05-04 DIAGNOSIS — I70.1 RENAL ARTERY ATHEROSCLEROSIS, BILATERAL: ICD-10-CM

## 2023-05-04 LAB
BILIRUB BLD-MCNC: ABNORMAL MG/DL
CLARITY, POC: CLEAR
COLOR UR: YELLOW
CRP SERPL-MCNC: 0.41 MG/DL (ref 0–0.5)
ERYTHROCYTE [SEDIMENTATION RATE] IN BLOOD: 10 MM/HR (ref 0–30)
EXPIRATION DATE: ABNORMAL
GLUCOSE UR STRIP-MCNC: NEGATIVE MG/DL
KETONES UR QL: NEGATIVE
LEUKOCYTE EST, POC: NEGATIVE
Lab: ABNORMAL
NITRITE UR-MCNC: NEGATIVE MG/ML
PH UR: 5 [PH] (ref 5–8)
PROT UR STRIP-MCNC: ABNORMAL MG/DL
RBC # UR STRIP: ABNORMAL /UL
SP GR UR: 1.03 (ref 1–1.03)
UROBILINOGEN UR QL: ABNORMAL

## 2023-05-04 PROCEDURE — 86225 DNA ANTIBODY NATIVE: CPT | Performed by: NURSE PRACTITIONER

## 2023-05-04 PROCEDURE — 87086 URINE CULTURE/COLONY COUNT: CPT | Performed by: NURSE PRACTITIONER

## 2023-05-04 PROCEDURE — 86038 ANTINUCLEAR ANTIBODIES: CPT | Performed by: NURSE PRACTITIONER

## 2023-05-04 PROCEDURE — 85652 RBC SED RATE AUTOMATED: CPT | Performed by: NURSE PRACTITIONER

## 2023-05-04 PROCEDURE — 86140 C-REACTIVE PROTEIN: CPT | Performed by: NURSE PRACTITIONER

## 2023-05-04 PROCEDURE — 86235 NUCLEAR ANTIGEN ANTIBODY: CPT | Performed by: NURSE PRACTITIONER

## 2023-05-04 PROCEDURE — 86160 COMPLEMENT ANTIGEN: CPT | Performed by: NURSE PRACTITIONER

## 2023-05-04 RX ORDER — LOSARTAN POTASSIUM AND HYDROCHLOROTHIAZIDE 12.5; 5 MG/1; MG/1
1 TABLET ORAL DAILY
Qty: 30 TABLET | Refills: 11 | COMMUNITY
Start: 2023-03-23 | End: 2024-03-22

## 2023-05-04 NOTE — PROGRESS NOTES
Chief Complaint  Pain (Right side pain and inner groin area) and Follow-up (Pt would like to discuss Cardio visit - Provider Triston on 03/23/2023)    Subjective        Medical History: has a past medical history of Acid reflux, Arthritis, Disease of thyroid gland, Hypothyroidism, OAB (overactive bladder), Pelvic pain, PONV (postoperative nausea and vomiting), and SOB (shortness of breath) on exertion.     Surgical History: has a past surgical history that includes Appendectomy (1983); Lymph node biopsy (Left, 2001); Cholecystectomy (2012); Laparoscopic tubal ligation (Bilateral, 1994); Abdominoplasty (2016); Knee arthroscopy (Right); Dental surgery (2008); Laparoscopy (Bilateral, 10/17/2016); Colonoscopy (2013); Laparoscopy (Right, 5/7/2018); Esophagogastroduodenoscopy (2013); Blepharoplasty (Bilateral, 12/15/2022); Head/Neck Lesion/Cyst Excision (Left, 12/15/2022); Blepharoplasty (Bilateral, 12/15/2022); and Cardiac catheterization (N/A, 3/6/2023).     Family History: family history includes Colon cancer in her maternal grandfather.     Social History: reports that she has been smoking cigarettes. She has a 15.00 pack-year smoking history. She has never used smokeless tobacco. She reports that she does not currently use alcohol. She reports that she does not use drugs.    Waleska Shah presents to [facility]  History of Present Illness  Back Pain  This is a chronic problem. The current episode started more than 1 year ago. The problem has been waxing and waning since onset. The pain is present in the thoracic spine. The pain is moderate. The pain is the same all the time. The symptoms are aggravated by bending and twisting. Stiffness is present in the morning. Pertinent negatives include no bladder incontinence, bowel incontinence or pelvic pain. Risk factors include poor posture (Macromastia). She has tried NSAIDs and heat for the symptoms. The treatment provided mild relief.   "Patient was seen in March and was given lidocaine patches she states that they work, but she continues to have pain, she is currently seeing cardiology and pulmonology.  She was seen in urology I was reviewing patient's last CT abdomen and pelvis, when noted that it was shown patient had moderate to severe renal atherosclerosis bilaterally.  Renal functions have been normal.  Lab work.  It is concerning because patient also has severe atherosclerosis in her aorta and currently sees cardiology.  Patient is not overweight, she eats healthy, and exercises when she can, she does have physical mobility at this time due to the chronic right lower thoracic back pain.      Objective   Vital Signs:   /72 (BP Location: Right arm, Patient Position: Sitting, Cuff Size: Adult)   Pulse 100   Temp 98.2 °F (36.8 °C) (Temporal)   Ht 170.2 cm (67\")   Wt 62.6 kg (138 lb)   SpO2 97%   BMI 21.61 kg/m²       Wt Readings from Last 3 Encounters:   05/04/23 62.6 kg (138 lb)   03/15/23 64.9 kg (143 lb)   03/06/23 65.8 kg (145 lb)        BP Readings from Last 3 Encounters:   05/04/23 138/72   03/15/23 152/84   03/06/23 162/86        BMI is within normal parameters. No other follow-up for BMI required.       Physical Exam  Vitals reviewed.   Constitutional:       Appearance: Normal appearance. She is well-developed.   HENT:      Head: Normocephalic and atraumatic.      Mouth/Throat:      Pharynx: No oropharyngeal exudate.   Eyes:      Conjunctiva/sclera: Conjunctivae normal.      Pupils: Pupils are equal, round, and reactive to light.   Cardiovascular:      Rate and Rhythm: Normal rate and regular rhythm.      Heart sounds: No murmur heard.  Pulmonary:      Effort: Pulmonary effort is normal.      Breath sounds: Normal breath sounds. No wheezing or rhonchi.   Musculoskeletal:      Comments: Tenderness with palpation over right flank, right lower thoracic.   Skin:     General: Skin is warm and dry.   Neurological:      Mental " Status: She is alert and oriented to person, place, and time.   Psychiatric:         Mood and Affect: Mood and affect normal.         Behavior: Behavior normal.         Thought Content: Thought content normal.         Judgment: Judgment normal.        Result Review :  {The following data was reviewed by LASHANDA Urban on 05/04/2023.  Common labs        10/20/2022    12:50 12/14/2022    15:09 3/4/2023    08:05   Common Labs   Glucose 72   106   96     BUN 11   16   16     Creatinine 0.93   1.08   0.83     Sodium 138   143   143     Potassium 4.4   4.6   4.4     Chloride 102   106   105     Calcium 10.5   9.9   10.3     Albumin 4.60    4.9     Total Bilirubin 0.2    0.4     Alkaline Phosphatase 97    115     AST (SGOT) 18    14     ALT (SGPT) 18    11     WBC 7.12   6.75   7.19     Hemoglobin 14.8   13.0   15.4     Hematocrit 42.8   38.2   46.2     Platelets 356   297   349     Total Cholesterol 242    250     Triglycerides 232    202     HDL Cholesterol 53    54     LDL Cholesterol  147    159       Data reviewed: Recent cardiology note           Brief Urine Lab Results  (Last result in the past 365 days)      Color   Clarity   Blood   Leuk Est   Nitrite   Protein   CREAT   Urine HCG        05/04/23 1556 Yellow   Clear   Moderate   Negative   Negative   Trace                 Current Outpatient Medications on File Prior to Visit   Medication Sig Dispense Refill   • albuterol sulfate  (90 Base) MCG/ACT inhaler Inhale 2 puffs Every 4 (Four) Hours As Needed for Wheezing or Shortness of Air. 8 g 2   • estradiol (ESTRACE) 0.1 MG/GM vaginal cream 1 (One) Time Per Week. SATURDAYS     • lidocaine (Lidoderm) 5 % Place 1 patch on the skin as directed by provider Daily. Remove & Discard patch within 12 hours or as directed by MD 30 patch 3   • losartan-hydrochlorothiazide (HYZAAR) 50-12.5 MG per tablet Take 1 tablet by mouth Daily. 30 tablet 11   • rosuvastatin (CRESTOR) 40 MG tablet Take 1 tablet by mouth  Every Night. 90 tablet 3   • tolterodine LA (DETROL LA) 2 MG 24 hr capsule Take 1 capsule by mouth Daily. PT HOLDING FOR SURGERY     • traMADol (ULTRAM) 50 MG tablet Take 1 tablet by mouth Every 6 (Six) Hours As Needed for Moderate Pain. 10 tablet 0   • vitamin D (ERGOCALCIFEROL) 1.25 MG (37186 UT) capsule capsule Take 1 capsule by mouth Every 7 (Seven) Days. WEDNESDAYS 13 capsule 1     No current facility-administered medications on file prior to visit.        Assessment and Plan  Diagnoses and all orders for this visit:    1. Chronic right-sided low back pain without sciatica (Primary)  Comments:  UA in office did show bilirubin, protein in urine.  Discussed with patient she needs to increase fluid intake.  Continue with current medication regimen  Orders:  -     Sedimentation Rate  -     C-reactive protein  -     POCT urinalysis dipstick, automated  -     Urine Culture - Urine, Urine, Clean Catch    2. Family history of systemic lupus erythematosus  -     Systemic Lupus Erythematosus (SLE) Profile B    3. Renal artery atherosclerosis, bilateral  Comments:  We will get a duplex renal artery bilateral Doppler to make sure there is no restrictive and blood flow to the kidneys.  If abnormal refer to nephrology  Orders:  -     Duplex Renal Artery - Bilateral Complete CAR; Future        Follow Up   Return in about 1 month (around 6/4/2023) for Recheck.  Patient was given instructions and counseling regarding her condition or for health maintenance advice. Please see specific information pulled into the AVS if appropriate.       Part of this note may be electronic transcription/translation of spoken language to printed text using the Dragon dictation system

## 2023-05-05 DIAGNOSIS — R06.02 SOB (SHORTNESS OF BREATH): Primary | ICD-10-CM

## 2023-05-06 LAB — BACTERIA SPEC AEROBE CULT: NO GROWTH

## 2023-05-09 LAB
ANA SER QL: NEGATIVE
C3 SERPL-MCNC: 240 MG/DL (ref 82–167)
C4 SERPL-MCNC: 44 MG/DL (ref 12–38)
CHROMATIN AB SERPL-ACNC: <0.2 AI (ref 0–0.9)
DSDNA AB SER-ACNC: <1 IU/ML (ref 0–9)

## 2023-05-11 DIAGNOSIS — M79.10 MYALGIA: ICD-10-CM

## 2023-05-11 DIAGNOSIS — R68.89 ABNORMAL ENDOCRINE LABORATORY TEST FINDING: ICD-10-CM

## 2023-05-11 DIAGNOSIS — Z82.69 FAMILY HISTORY OF SYSTEMIC LUPUS ERYTHEMATOSUS: Primary | ICD-10-CM

## 2023-05-12 ENCOUNTER — TELEPHONE (OUTPATIENT)
Dept: FAMILY MEDICINE CLINIC | Facility: CLINIC | Age: 58
End: 2023-05-12
Payer: OTHER GOVERNMENT

## 2023-05-12 NOTE — TELEPHONE ENCOUNTER
Patient came in to office to  paperwork. We did not have all the paperwork she had requested. She said she will be back Monday to pick it up.    She said she spoke with someone on the phone today and that person know what she needs and that they had to speak with PCP. She would not provide more information.

## 2023-05-15 ENCOUNTER — TELEPHONE (OUTPATIENT)
Dept: FAMILY MEDICINE CLINIC | Facility: CLINIC | Age: 58
End: 2023-05-15
Payer: OTHER GOVERNMENT

## 2023-05-15 NOTE — TELEPHONE ENCOUNTER
Patient came in to office to  paperwork and request a referral to [facility]. She has an appt already scheduled for 5/18/2023 at 10am. Patient has spoken with PCP about this.    She would like to come in to  the order

## 2023-05-16 NOTE — TELEPHONE ENCOUNTER
Needs [facility] referral for [facility]  [facility] [place] [address]    Has appt. 5/18/2023. She needs [facility] Approval sheet that arrives by fax that has the lung doctor's name on it.

## 2023-05-17 NOTE — TELEPHONE ENCOUNTER
Spoke with gabbi in referral team who stated she has already spoken with patient and advised pt she may  referral anytime

## 2023-06-02 ENCOUNTER — HOSPITAL ENCOUNTER (OUTPATIENT)
Dept: CARDIOLOGY | Facility: HOSPITAL | Age: 58
Discharge: HOME | End: 2023-06-02

## 2023-06-02 DIAGNOSIS — I70.1 RENAL ARTERY ATHEROSCLEROSIS, BILATERAL: ICD-10-CM

## 2023-06-02 LAB
BH CV ECHO MEAS - DIST REN A EDV LEFT: 28.1 CM/S
BH CV ECHO MEAS - DIST REN A PSV LEFT: 86.8 CM/S
BH CV ECHO MEAS - MID REN A EDV LEFT: 31.9 CM/S
BH CV ECHO MEAS - MID REN A PSV LEFT: 114 CM/S
BH CV ECHO MEAS - PROX REN A EDV LEFT: 70.5 CM/S
BH CV ECHO MEAS - PROX REN A PSV LEFT: 201 CM/S
BH CV VAS BP LEFT ARM: NORMAL MMHG
BH CV VAS BP RIGHT ARM: NORMAL MMHG
BH CV VAS KIDNEY HEIGHT LEFT: 5.67 CM
BH CV VAS RENAL AORTIC MID PSV: 67.5 CM/S
BH CV VAS RENAL HILUM LEFT EDV: 21.6 CM/S
BH CV VAS RENAL HILUM LEFT PSV: 66.3 CM/S
BH CV VAS RENAL HILUM RIGHT EDV: 25.9 CM/S
BH CV VAS RENAL HILUM RIGHT PSV: 78.3 CM/S
BH CV XLRA MEAS - KID L LEFT: 10.43 CM
BH CV XLRA MEAS DIST REN A EDV RIGHT: 31.6 CM/S
BH CV XLRA MEAS DIST REN A PSV RIGHT: 87.7 CM/S
BH CV XLRA MEAS KID H RIGHT: 4.37 CM
BH CV XLRA MEAS KID L RIGHT: 10.19 CM
BH CV XLRA MEAS KID W RIGHT: 4.3 CM
BH CV XLRA MEAS MID REN A EDV RIGHT: 37.3 CM/S
BH CV XLRA MEAS MID REN A PSV RIGHT: 125 CM/S
BH CV XLRA MEAS PROX REN A EDV RIGHT: 97.8 CM/S
BH CV XLRA MEAS PROX REN A PSV RIGHT: 287 CM/S
BH CV XLRA MEAS RAR LEFT: 3.2
BH CV XLRA MEAS RAR RIGHT: 5.6
BH CV XLRA MEAS RENAL A ORG EDV LEFT: 58.5 CM/S
BH CV XLRA MEAS RENAL A ORG EDV RIGHT: 96.8 CM/S
BH CV XLRA MEAS RENAL A ORG PSV LEFT: 215 CM/S
BH CV XLRA MEAS RENAL A ORG PSV RIGHT: 381 CM/S
LEFT KIDNEY WIDTH: 4.96 CM
LEFT RENAL UPPER PARENCHYMA MAX: 26.6 CM/S
LEFT RENAL UPPER PARENCHYMA MIN: 9.26 CM/S
LEFT RENAL UPPER PARENCHYMA RI: 0.65
MAXIMAL PREDICTED HEART RATE: 164 BPM
RIGHT RENAL UPPER PARENCHYMA MAX: 23.7 CM/S
RIGHT RENAL UPPER PARENCHYMA MIN: 7.37 CM/S
RIGHT RENAL UPPER PARENCHYMA RI: 0.69
STRESS TARGET HR: 139 BPM

## 2023-06-02 PROCEDURE — 93975 VASCULAR STUDY: CPT

## 2023-06-05 DIAGNOSIS — I70.1 RIGHT RENAL ARTERY STENOSIS: Primary | ICD-10-CM

## 2023-06-05 DIAGNOSIS — I70.1 LEFT RENAL ARTERY STENOSIS: ICD-10-CM

## 2023-06-05 NOTE — PROGRESS NOTES
Called and spoke to Waleska Shah to relay result and referral update. Pt verbalized understanding. No further questions/concerns at this time.

## 2023-06-07 ENCOUNTER — OFFICE VISIT (OUTPATIENT)
Dept: FAMILY MEDICINE CLINIC | Facility: CLINIC | Age: 58
End: 2023-06-07
Payer: OTHER GOVERNMENT

## 2023-06-07 VITALS
TEMPERATURE: 97.4 F | HEIGHT: 67 IN | WEIGHT: 148.4 LBS | SYSTOLIC BLOOD PRESSURE: 132 MMHG | DIASTOLIC BLOOD PRESSURE: 74 MMHG | BODY MASS INDEX: 23.29 KG/M2 | HEART RATE: 67 BPM | OXYGEN SATURATION: 99 %

## 2023-06-07 DIAGNOSIS — E78.2 MIXED HYPERLIPIDEMIA: ICD-10-CM

## 2023-06-07 DIAGNOSIS — Z09 FOLLOW-UP EXAM: Primary | ICD-10-CM

## 2023-06-07 DIAGNOSIS — I70.90 ATHEROSCLEROSIS: ICD-10-CM

## 2023-06-07 DIAGNOSIS — M32.9 LUPUS: ICD-10-CM

## 2023-06-07 DIAGNOSIS — I70.1 RENAL ARTERY STENOSIS: ICD-10-CM

## 2023-06-08 RX ORDER — COLCHICINE 0.6 MG/1
0.6 TABLET ORAL DAILY
Qty: 30 TABLET | Refills: 1 | Status: SHIPPED | OUTPATIENT
Start: 2023-06-08

## 2023-06-08 NOTE — PROGRESS NOTES
Chief Complaint  Follow-up and Hyperlipidemia    Subjective        Medical History: has a past medical history of Acid reflux, Arthritis, Disease of thyroid gland, Hypothyroidism, OAB (overactive bladder), Pelvic pain, PONV (postoperative nausea and vomiting), and SOB (shortness of breath) on exertion.     Surgical History: has a past surgical history that includes Appendectomy (1983); Lymph node biopsy (Left, 2001); Cholecystectomy (2012); Laparoscopic tubal ligation (Bilateral, 1994); Abdominoplasty (2016); Knee arthroscopy (Right); Dental surgery (2008); Laparoscopy (Bilateral, 10/17/2016); Colonoscopy (2013); Laparoscopy (Right, 5/7/2018); Esophagogastroduodenoscopy (2013); Blepharoplasty (Bilateral, 12/15/2022); Head/Neck Lesion/Cyst Excision (Left, 12/15/2022); Blepharoplasty (Bilateral, 12/15/2022); and Cardiac catheterization (N/A, 3/6/2023).     Family History: family history includes Colon cancer in her maternal grandfather.     Social History: reports that she has been smoking cigarettes. She has a 15.00 pack-year smoking history. She has never used smokeless tobacco. She reports that she does not currently use alcohol. She reports that she does not use drugs.    Waleska Shah presents to [facility]  Hyperlipidemia  This is a chronic problem. The current episode started more than 1 year ago. The problem is uncontrolled. She has no history of chronic renal disease or obesity. There are no known factors aggravating her hyperlipidemia. Pertinent negatives include no chest pain or leg pain. Current antihyperlipidemic treatment includes statins. Compliance problems: Adherence to taking medication on a regular basis.  Risk factors for coronary artery disease include post-menopausal and dyslipidemia (Atherosclerosis disease).     Patient is a 57-year-old female who comes in for follow-up.  Patient wanted to discuss new referrals and upcoming appointments.  Recent test of the  "renal duplex showed that patient has greater than 60% renal stenosis and bilateral renal artery, have referred over to nephrology, patient states she had received a phone call but wanted to discuss with me if she should be seen at the [facility] or the  [place] office.  Patient has preference to be seen at [place] office.  I did discuss with patient that she can be seen at the [place] office, so I recommend for her to call office back and set up clinic.  Patient verbalized understanding.    Patient has an appointment to see cardiology tomorrow.    Patient has a positive lupus panel at previous visit I referred over to rheumatology as of right now patient does not have an appointment and I will discuss with scheduling to follow back up with her referral and make sure patient gets appointment.        Objective   Vital Signs:   /74   Pulse 67   Temp 97.4 °F (36.3 °C)   Ht 170.2 cm (67\")   Wt 67.3 kg (148 lb 6.4 oz)   SpO2 99%   BMI 23.24 kg/m²       Wt Readings from Last 3 Encounters:   06/07/23 67.3 kg (148 lb 6.4 oz)   05/04/23 62.6 kg (138 lb)   03/15/23 64.9 kg (143 lb)        BP Readings from Last 3 Encounters:   06/07/23 132/74   05/04/23 138/72   03/15/23 152/84        BMI is within normal parameters. No other follow-up for BMI required.       Physical Exam  Vitals reviewed.   Constitutional:       Appearance: Normal appearance. She is well-developed.   HENT:      Head: Normocephalic and atraumatic.   Eyes:      Conjunctiva/sclera: Conjunctivae normal.      Pupils: Pupils are equal, round, and reactive to light.   Cardiovascular:      Rate and Rhythm: Normal rate and regular rhythm.      Heart sounds: No murmur heard.  Pulmonary:      Effort: Pulmonary effort is normal.      Breath sounds: Normal breath sounds. No wheezing or rhonchi.   Skin:     General: Skin is warm and dry.   Neurological:      Mental Status: She is alert and oriented to person, place, and time.   Psychiatric:   "       Mood and Affect: Mood and affect normal.         Behavior: Behavior normal.         Thought Content: Thought content normal.         Judgment: Judgment normal.      Result Review :  {The following data was reviewed by LASHANDA Urban on 06/07/2023.  Common labs          10/20/2022    12:50 12/14/2022    15:09 3/4/2023    08:05   Common Labs   Glucose 72  106  96    BUN 11  16  16    Creatinine 0.93  1.08  0.83    Sodium 138  143  143    Potassium 4.4  4.6  4.4    Chloride 102  106  105    Calcium 10.5  9.9  10.3    Albumin 4.60   4.9    Total Bilirubin 0.2   0.4    Alkaline Phosphatase 97   115    AST (SGOT) 18   14    ALT (SGPT) 18   11    WBC 7.12  6.75  7.19    Hemoglobin 14.8  13.0  15.4    Hematocrit 42.8  38.2  46.2    Platelets 356  297  349    Total Cholesterol 242   250    Triglycerides 232   202    HDL Cholesterol 53   54    LDL Cholesterol  147   159      Data reviewed : Previous office note             Current Outpatient Medications on File Prior to Visit   Medication Sig Dispense Refill    albuterol sulfate  (90 Base) MCG/ACT inhaler Inhale 2 puffs Every 4 (Four) Hours As Needed for Wheezing or Shortness of Air. 8 g 2    estradiol (ESTRACE) 0.1 MG/GM vaginal cream 1 (One) Time Per Week. SATURDAYS      lidocaine (Lidoderm) 5 % Place 1 patch on the skin as directed by provider Daily. Remove & Discard patch within 12 hours or as directed by MD 30 patch 3    losartan-hydrochlorothiazide (HYZAAR) 50-12.5 MG per tablet Take 1 tablet by mouth Daily. 30 tablet 11    rosuvastatin (CRESTOR) 40 MG tablet Take 1 tablet by mouth Every Night. 90 tablet 3    tolterodine LA (DETROL LA) 2 MG 24 hr capsule Take 1 capsule by mouth Daily. PT HOLDING FOR SURGERY      traMADol (ULTRAM) 50 MG tablet Take 1 tablet by mouth Every 6 (Six) Hours As Needed for Moderate Pain. 10 tablet 0    vitamin D (ERGOCALCIFEROL) 1.25 MG (22133 UT) capsule capsule Take 1 capsule by mouth Every 7 (Seven) Days. WEDNESDAYS  13 capsule 1     No current facility-administered medications on file prior to visit.        Assessment and Plan  Diagnoses and all orders for this visit:    1. Follow-up exam (Primary)    2. Mixed hyperlipidemia  Comments:  Discussed with patient LDL goal is actually below 50 due to her atherosclerosis.  She sees cardiology tomorrow    3. Atherosclerosis  Comments:  Long discussion with patient about the atherosclerosis, patient was visually shown via pictures of what plaque looks like buildup on the arteries and veins.  Patient is not obese, likely this is hereditary due to her hyperlipidemia and has been uncontrolled for many years.  Discussed with patient.  Will speak with cardiology, I will order a recheck of lipid panel and adjust medication if needed.  Patient would likely be a candidate for Repatha, will follow cardiology notes.  Patient is agreeable with current treatment plan.    4. Lupus  Comments:  We will talk with scheduling to get patient scheduled with rheumatology, patient is agreeable    5. Renal artery stenosis  Comments:  Encourage patient to call Dr. Salazar's office to set up an appointment at the [place] location.  Patient is agreeable    Other orders  -     colchicine 0.6 MG tablet; Take 1 tablet by mouth Daily.  Dispense: 30 tablet; Refill: 1        Follow Up   Return in about 3 months (around 9/7/2023) for Recheck.  Patient was given instructions and counseling regarding her condition or for health maintenance advice. Please see specific information pulled into the AVS if appropriate.       Part of this note may be electronic transcription/translation of spoken language to printed text using the Dragon dictation system

## 2023-06-14 ENCOUNTER — TELEPHONE (OUTPATIENT)
Dept: FAMILY MEDICINE CLINIC | Facility: CLINIC | Age: 58
End: 2023-06-14
Payer: OTHER GOVERNMENT

## 2023-06-14 NOTE — TELEPHONE ENCOUNTER
I spoke with the patient and advised her I had called the office of [facility] and they had advised me they made a mistake on their end her referral is okay they have all the paperwork they need and they will call her to schedule in the next couple of days.

## 2023-08-25 ENCOUNTER — TELEPHONE (OUTPATIENT)
Dept: FAMILY MEDICINE CLINIC | Facility: CLINIC | Age: 58
End: 2023-08-25

## 2023-08-25 NOTE — TELEPHONE ENCOUNTER
THE HUB CALLED ME AND STATED THAT THE PATIENT WAS ON THE LINE WANTING MEDICAL RECORDS FOR THE LAST THREE MONTHS. THE HUB TOLD ME THAT THEY INFORMED HER THAT SHE WOULD HAVE TO GO TO MEDICAL RECORDS FOR THAT SHE INSISTED THAT SHE TALK TO SOMEONE FROM THE OFFICE . I TOOK THE CALL AND ASKED THE PATIENT WHAT IT WAS THAT SHE NEEDED. SHE STATED SHE NEED ALL HER RECORDS FOR ALL HER LABS AND THE NOTES FROM HER KIDNEY SPECIALIST. I INFORMED THE PATIENT THAT SHE WOULD HAVE TO GO TO MEDICAL RECORDS SHE STATED THAT SHE DID AND THEY TOLD HER IT WOULD BE 7 DAYS  BEFORE THAT WILL BE DONE. THE PATIENT STATED SHE CANNOT WAIT THAT HER DR APPT IS ON TUESDAY. I ASKED WHO HER DR WAS AND THE FAX NUMBER SHE STATED SHE DID NOT KNOW THAT SHE WAS AT WORK AND SHE DID NOT HAVE HER PAPERS WITH HER. SHE ASKED ME YOU WONT DO IT I STATED IT NOT THAT I WONT IT IS BECAUSE I DO NOT HAVE THE DR NAME OR FAX NUMBER TO FAX ANYTHING OVER TO. SHE STATED I AM SORRY BUT I AM AT WORK AND I DO NOT HAVE MY PAPERS WITH ME. I APOLOGIZED AND STATED IF I HAD THAT INFORMATION THEN I WOULD BE HAPPY TO FAX SOME INFORMATION OVER TO THE OFFICE.

## 2023-08-28 ENCOUNTER — OFFICE VISIT (OUTPATIENT)
Dept: SLEEP MEDICINE | Facility: HOSPITAL | Age: 58
End: 2023-08-28
Payer: OTHER GOVERNMENT

## 2023-08-28 VITALS
WEIGHT: 143 LBS | DIASTOLIC BLOOD PRESSURE: 80 MMHG | HEART RATE: 92 BPM | OXYGEN SATURATION: 97 % | SYSTOLIC BLOOD PRESSURE: 153 MMHG | BODY MASS INDEX: 22.44 KG/M2 | HEIGHT: 67 IN

## 2023-08-28 DIAGNOSIS — R06.83 SNORING: ICD-10-CM

## 2023-08-28 DIAGNOSIS — I10 ESSENTIAL HYPERTENSION: ICD-10-CM

## 2023-08-28 DIAGNOSIS — G89.29 OTHER CHRONIC PAIN: ICD-10-CM

## 2023-08-28 DIAGNOSIS — R29.818 SUSPECTED SLEEP APNEA: Primary | ICD-10-CM

## 2023-08-28 DIAGNOSIS — G47.30 SLEEP APNEA, UNSPECIFIED TYPE: ICD-10-CM

## 2023-08-28 PROCEDURE — G0463 HOSPITAL OUTPT CLINIC VISIT: HCPCS | Performed by: FAMILY MEDICINE

## 2023-08-28 RX ORDER — CARBOXYMETHYLCELLULOSE SODIUM 5 MG/ML
2 SOLUTION/ DROPS OPHTHALMIC
COMMUNITY

## 2023-08-28 RX ORDER — ASPIRIN 81 MG/1
81 TABLET ORAL DAILY
COMMUNITY
Start: 2023-03-06

## 2023-08-28 RX ORDER — HYDROCODONE BITARTRATE AND ACETAMINOPHEN 5; 325 MG/1; MG/1
1 TABLET ORAL
COMMUNITY

## 2023-08-28 RX ORDER — AMOXICILLIN 500 MG/1
CAPSULE ORAL
COMMUNITY
Start: 2023-08-21

## 2023-08-28 RX ORDER — ATORVASTATIN CALCIUM 20 MG/1
20 TABLET, FILM COATED ORAL DAILY
Qty: 30 TABLET | Refills: 11 | COMMUNITY
Start: 2023-03-23 | End: 2024-03-22

## 2023-08-28 NOTE — PROGRESS NOTES
[facility]  [address] [place] [address]  [place]   [place] [address]  Phone: [phone]  Fax: [phone]      Waleska MCCAULEY Alyssa  [id number]   1965  57 y.o.  female      Referring physician/provider PCP Erick Sandhu APRN    Type of service: Initial Sleep Medicine Consult.  Date of service: 8/28/2023      Chief Complaint   Patient presents with    Snoring       History of present illness;  Thank you for asking to see Waleska Shah, 57 y.o. w/ PMHx of HTN, Chronic Pain The patient was seen today on 8/28/2023 at [facility].  The patient presents today with symptoms of snoring The symptoms are present for >1 and they are persistent in nature.  The snoring is present in all positions and it is loud.  Patient denies prior surgery namely tonsillectomy, nasal surgery and UPPP.     -States she was instructed by her PCP to be evaluated for sleep apnea never had sleep study in the past  -Her kids say she snores   Denies witnessed apneas, denies excessive daytime sleepiness, denies gasping for air at night, sleep is mostly restorative   -takes hydrocone for chronic pain  >1 year   -States self directed she stopped all her medications except for her hydrocodone   -BP in sleep clinic 153/80   Denies chest-pain/focal-weakness/dyspnea/anuria     Obstructive Sleep Apnea Screening: STOP-BANG Sleep Apnea Questionnaire. Reference: Gage ESPARZA et al. Br J Anaesth, 2012.    Criterion    Yes    No  Do you SNORE loudly?    [ x]    [ ]  Do you often feel TIRED, fatigued, or sleepy during the day?    [ ]    [ x]  Has anyone OBSERVED you stop breathing during your sleep?    [ ]    [ x]  Do you have or are you being treated for high blood PRESSURE?    [x ]    [ ]  BMI >32 kg/m2    [ ]    [x ]  AGE > 50 years    [x ]    [ ]  NECK circumference >16 inches / 40 cm    [ ]    [ x]  GENDER: male    [ ]    [x ]    OMAYRA Probability:  [ ] 1-2 - low  [x ] 3-4 - intermediate  [ ] 5-8 - high        Further  "Sleep History:    Bedtime: 8pm   Rise Time: 2 a.m for work    Sleep Latency: less than 15 minutes   Screens in bed: TV movies in bed   Wake after sleep onset: 1x   Reasons for awakenings: nocturia  Number of naps per day denies  Naps restorative: n/a  Caffeine use: 5 pm will have 12 cups throughout the day     RLS Symptoms: No   Bruxism:No   Current sleep related gastroesophageal reflux symptoms:  No   Cataplexy:  No   Sleep Paralysis:  No   Hypnagogic or hypnopompic hallucinations: No   Parasomnias such as sleep walking or sleep eating No         MEDICAL CONDITIONS (PMH)   HTN  Chronic pain    Social history:  Do you drive a commercial vehicle:  No   Shift work:   Yes - 4:30 a.m to 1 pm to 3:30 pm   Tobacco use: 1 cigar  Alcohol use:  Denies  Occupation: Manager for [employer]     Family Hx (parents and siblings) (pertaining to sleep medicine)  Denies     Medications: reviewed    Review of systems:  Positive symptoms are :  Snoring  Witnessed apnea  Daytime excessive sleepiness with [place] Sleepiness Scale of Total score: 5   Fatigue         Physical exam:  Vitals:    08/28/23 1500   BP: 153/80   Pulse: 92   SpO2: 97%   Weight: 64.9 kg (143 lb)   Height: 170.2 cm (67\")    Body mass index is 22.4 kg/mý.   CONSTITUTIONAL:  Non-toxic, In no overt distress   Head: normocephalic   ENT: Mallampati class III, + macroglossia, no septal defects   NECK:Neck Circumference: 13 inches,no nuchal rigidity  RESPIRATORY SYSTEM: Breath sounds are clear (no rales, no rhonchi, no wheezes), no accessory muscle use  CARDIOVASULAR SYSTEM: Heart sounds are regular rhythm and normal rate, no rub, no gallop, no edema  NEUROLOGICAL SYSTEM: Oriented x 3, No gross focal deficits   PSYCHIATRIC SYSTEM: Goal oriented, affect full range appropriate    Office notes from care team reviewed. Office note dated -6/7/23 LASHANDA Sandhu ,reviewed  Labs reviewed.  -3/4/23  Bicarb 26.2    Assessment and plan:  Suspected sleep apnea [R29.818] patient's " symptoms and examination is consistent with sleep apnea (G47.30). I have talked to the patient about the signs and symptoms of sleep apnea. In addition, I have also discussed pathophysiology of sleep apnea.  I also discussed the complications of untreated sleep apnea including effects on hypertension, diabetes mellitus and nonrestorative sleep with hypersomnia which can increase risk for motor vehicle accidents.  Untreated sleep apnea is also a risk factor for development of atrial fibrillation, hypertension, insulin resistance and cerebrovascular accident.  Discussed in detail of various testing methods including home-based and lab based sleep studies.  Based on history and physical examination and other comorbidities the most appropriate study is SPLIT > 15 in laboratory polysomnography, rather than home sleep apnea testing,to rule out diagnosis of sleep apnea per AASM clinical practice guidelines (doi:[email or web address]/jcsm.6506) secondary to patient's history of  chronic pain on opoid therapy. The order for the sleep study is placed in [facility].  The test will be scheduled after approval from insurance. Treatment and management will be discussed after the test is completed.  Patient was given opportunity to ask questions and all the questions were answered.   Snoring (R06.83), snoring is the sound created by turbulent airflow vibrating upper airway soft tissue due to limitation of inspiratory airflow. I have also discussed factors affecting snoring including sleep deprivation, sleeping on the back and alcohol ingestion. To minimize snoring, patient is advised to have adequate sleep, sleep on the side and avoid alcohol and sedative medications before bedtime  Chronic Pain  Current opoid use for chronic pain is an indication for in laboratory polysomnography. Opoid use places patient at a greater risk for sleep apnea specifically central sleep apnea. I did make patient aware of same.. Follow up with PCP for same for risks  benefits discussion and serial management of same.   HTN,  I counseled her compliance with her home medications and to follow up with PCP for serial monitoring of same.    I have also discussed with the patient the following  Sleep hygiene: Maintaining a regular bedtime and wake time, not to watch television or work in bed, limit caffeine-containing beverages before bed time and avoid naps during the day  Adequate amount of sleep.  Generally most people needs about 7 to 8 hours of sleep.    Return for 31 to 90 days after PAP setup with down load.  Patient's questions were answered      I once again thank you for asking me to see this patient in consultation and I have forwarded my opinion and treatment plan.  Please do not hesitate to call me if you have any questions.       EMR Dragon/Transcription disclaimer:   Much of this encounter note is an electronic transcription/translation of spoken language to printed text. The electronic translation of spoken language may permit erroneous, or at times, nonsensical words or phrases to be inadvertently transcribed; Although I have reviewed the note for such errors, some may still exist.     NPI #: [id number]    Teri De Los Santos, DO  Sleep Medicine  [facility]  08/28/23

## 2023-08-29 ENCOUNTER — TRANSCRIBE ORDERS (OUTPATIENT)
Dept: LAB | Facility: HOSPITAL | Age: 58
End: 2023-08-29
Payer: OTHER GOVERNMENT

## 2023-08-29 ENCOUNTER — LAB (OUTPATIENT)
Dept: LAB | Facility: HOSPITAL | Age: 58
End: 2023-08-29
Payer: OTHER GOVERNMENT

## 2023-08-29 DIAGNOSIS — I10 ESSENTIAL HYPERTENSION, MALIGNANT: ICD-10-CM

## 2023-08-29 DIAGNOSIS — N18.2 CHRONIC KIDNEY DISEASE, STAGE II (MILD): Primary | ICD-10-CM

## 2023-08-29 DIAGNOSIS — N18.2 CHRONIC KIDNEY DISEASE, STAGE II (MILD): ICD-10-CM

## 2023-08-29 DIAGNOSIS — I70.1 ATHEROSCLEROSIS OF RENAL ARTERY: ICD-10-CM

## 2023-08-29 LAB
C3 SERPL-MCNC: 144 MG/DL (ref 82–167)
C4 SERPL-MCNC: 34 MG/DL (ref 14–44)
ERYTHROCYTE [SEDIMENTATION RATE] IN BLOOD: 21 MM/HR (ref 0–30)
HBV SURFACE AG SERPL QL IA: NORMAL
HCV AB SER DONR QL: NORMAL

## 2023-08-29 PROCEDURE — 86225 DNA ANTIBODY NATIVE: CPT

## 2023-08-29 PROCEDURE — 83516 IMMUNOASSAY NONANTIBODY: CPT

## 2023-08-29 PROCEDURE — 86317 IMMUNOASSAY INFECTIOUS AGENT: CPT

## 2023-08-29 PROCEDURE — 82784 ASSAY IGA/IGD/IGG/IGM EACH: CPT

## 2023-08-29 PROCEDURE — 36415 COLL VENOUS BLD VENIPUNCTURE: CPT

## 2023-08-29 PROCEDURE — 86037 ANCA TITER EACH ANTIBODY: CPT

## 2023-08-29 PROCEDURE — 86334 IMMUNOFIX E-PHORESIS SERUM: CPT

## 2023-08-29 PROCEDURE — 86803 HEPATITIS C AB TEST: CPT

## 2023-08-29 PROCEDURE — 87340 HEPATITIS B SURFACE AG IA: CPT

## 2023-08-29 PROCEDURE — 86160 COMPLEMENT ANTIGEN: CPT

## 2023-08-29 PROCEDURE — 85652 RBC SED RATE AUTOMATED: CPT

## 2023-08-29 PROCEDURE — 83521 IG LIGHT CHAINS FREE EACH: CPT

## 2023-08-29 PROCEDURE — 86704 HEP B CORE ANTIBODY TOTAL: CPT

## 2023-08-29 PROCEDURE — 86038 ANTINUCLEAR ANTIBODIES: CPT

## 2023-08-31 LAB
ANA SER QL: NEGATIVE
DSDNA AB SER-ACNC: <1 IU/ML (ref 0–9)
HBV CORE AB SERPL QL IA: NEGATIVE
HBV SURFACE AB SER-ACNC: 4.1 MIU/ML
IGA SERPL-MCNC: 265 MG/DL (ref 87–352)
IGG SERPL-MCNC: 860 MG/DL (ref 586–1602)
IGM SERPL-MCNC: 27 MG/DL (ref 26–217)
KAPPA LC FREE SER-MCNC: 25.7 MG/L (ref 3.3–19.4)
KAPPA LC FREE/LAMBDA FREE SER: 1.49 {RATIO} (ref 0.26–1.65)
LAMBDA LC FREE SERPL-MCNC: 17.3 MG/L (ref 5.7–26.3)
PROT PATTERN SERPL IFE-IMP: NORMAL

## 2023-09-01 LAB
C-ANCA TITR SER IF: NORMAL TITER
GBM AB SER IA-ACNC: <0.2 UNITS (ref 0–0.9)
MYELOPEROXIDASE AB SER IA-ACNC: <0.2 UNITS (ref 0–0.9)
P-ANCA ATYPICAL TITR SER IF: NORMAL TITER
P-ANCA TITR SER IF: NORMAL TITER
PROTEINASE3 AB SER IA-ACNC: <0.2 UNITS (ref 0–0.9)

## 2023-09-15 ENCOUNTER — OFFICE VISIT (OUTPATIENT)
Dept: FAMILY MEDICINE CLINIC | Facility: CLINIC | Age: 58
End: 2023-09-15
Payer: OTHER GOVERNMENT

## 2023-09-15 VITALS
BODY MASS INDEX: 23.04 KG/M2 | HEIGHT: 67 IN | SYSTOLIC BLOOD PRESSURE: 124 MMHG | DIASTOLIC BLOOD PRESSURE: 78 MMHG | TEMPERATURE: 97.2 F | HEART RATE: 92 BPM | OXYGEN SATURATION: 97 % | WEIGHT: 146.8 LBS

## 2023-09-15 DIAGNOSIS — R05.1 ACUTE COUGH: ICD-10-CM

## 2023-09-15 DIAGNOSIS — M32.9 LUPUS: ICD-10-CM

## 2023-09-15 DIAGNOSIS — M25.50 POLYARTHRALGIA: ICD-10-CM

## 2023-09-15 DIAGNOSIS — E55.9 VITAMIN D DEFICIENCY DISEASE: ICD-10-CM

## 2023-09-15 DIAGNOSIS — Z09 FOLLOW-UP EXAM: Primary | ICD-10-CM

## 2023-09-15 RX ORDER — BENZONATATE 200 MG/1
200 CAPSULE ORAL 3 TIMES DAILY PRN
Qty: 30 CAPSULE | Refills: 1 | Status: SHIPPED | OUTPATIENT
Start: 2023-09-15

## 2023-09-15 RX ORDER — AZITHROMYCIN 250 MG/1
TABLET, FILM COATED ORAL
Qty: 6 TABLET | Refills: 0 | Status: SHIPPED | OUTPATIENT
Start: 2023-09-15

## 2023-09-15 RX ORDER — ERGOCALCIFEROL 1.25 MG/1
50000 CAPSULE ORAL
Qty: 13 CAPSULE | Refills: 1 | Status: SHIPPED | OUTPATIENT
Start: 2023-09-15

## 2023-09-15 RX ORDER — TRAMADOL HYDROCHLORIDE 50 MG/1
50 TABLET ORAL EVERY 6 HOURS PRN
Qty: 30 TABLET | Refills: 0 | Status: SHIPPED | OUTPATIENT
Start: 2023-09-15

## 2023-09-15 RX ORDER — ALBUTEROL SULFATE 90 UG/1
2 AEROSOL, METERED RESPIRATORY (INHALATION) EVERY 4 HOURS PRN
Qty: 8 G | Refills: 2 | Status: SHIPPED | OUTPATIENT
Start: 2023-09-15

## 2023-09-15 NOTE — PROGRESS NOTES
Chief Complaint  Flank Pain    Subjective        Medical History: has a past medical history of Acid reflux, Arthritis, Depression, Disease of thyroid gland, Hypothyroidism, OAB (overactive bladder), Pelvic pain, PONV (postoperative nausea and vomiting), and SOB (shortness of breath) on exertion.     Surgical History: has a past surgical history that includes Appendectomy (1983); Lymph node biopsy (Left, 2001); Cholecystectomy (2012); Laparoscopic tubal ligation (Bilateral, 1994); Abdominoplasty (2016); Knee arthroscopy (Right); Dental surgery (2008); Laparoscopy (Bilateral, 10/17/2016); Colonoscopy (2013); Laparoscopy (Right, 5/7/2018); Esophagogastroduodenoscopy (2013); Blepharoplasty (Bilateral, 12/15/2022); Head/Neck Lesion/Cyst Excision (Left, 12/15/2022); Blepharoplasty (Bilateral, 12/15/2022); and Cardiac catheterization (N/A, 3/6/2023).     Family History: family history includes Colon cancer in her maternal grandfather.     Social History: reports that she has been smoking cigarettes. She has a 15.00 pack-year smoking history. She has never used smokeless tobacco. She reports that she does not currently use alcohol. She reports that she does not use drugs.    Waleska Shah presents to [facility]  Flank Pain  This is a chronic problem. The current episode started more than 1 year ago. The problem is unchanged. The pain is present in the lumbar spine. The quality of the pain is described as aching. The pain is at a severity of 5/10. The pain is moderate. The symptoms are aggravated by bending, sitting and standing. Pertinent negatives include no bladder incontinence, bowel incontinence, headaches, pelvic pain or perianal numbness. Risk factors include menopause. She has tried NSAIDs and analgesics for the symptoms. The treatment provided moderate relief.   Cough  This is a new problem. The current episode started 1 to 4 weeks ago. The problem has been waxing and waning.  "The problem occurs every few minutes. The cough is Productive of sputum. Associated symptoms include nasal congestion, postnasal drip, rhinorrhea and a sore throat. Pertinent negatives include no headaches or shortness of breath. The symptoms are aggravated by lying down. Risk factors for lung disease include smoking/tobacco exposure. She has tried OTC cough suppressant for the symptoms. The treatment provided no relief. Her past medical history is significant for COPD.     Objective   Vital Signs:   /78   Pulse 92   Temp 97.2 °F (36.2 °C)   Ht 170.2 cm (67\")   Wt 66.6 kg (146 lb 12.8 oz)   SpO2 97%   BMI 22.99 kg/m²       Wt Readings from Last 3 Encounters:   09/15/23 66.6 kg (146 lb 12.8 oz)   08/28/23 64.9 kg (143 lb)   06/07/23 67.3 kg (148 lb 6.4 oz)        BP Readings from Last 3 Encounters:   09/15/23 124/78   08/28/23 153/80   06/07/23 132/74        BMI is within normal parameters. No other follow-up for BMI required.       Physical Exam  Vitals reviewed.   Constitutional:       Appearance: Normal appearance. She is well-developed.   HENT:      Head: Normocephalic and atraumatic.   Eyes:      Conjunctiva/sclera: Conjunctivae normal.      Pupils: Pupils are equal, round, and reactive to light.   Cardiovascular:      Rate and Rhythm: Normal rate and regular rhythm.      Heart sounds: No murmur heard.  Pulmonary:      Effort: Pulmonary effort is normal.      Breath sounds: Normal breath sounds. No wheezing or rhonchi.   Abdominal:      General: Bowel sounds are normal.      Palpations: Abdomen is soft.   Skin:     General: Skin is warm and dry.   Neurological:      Mental Status: She is alert and oriented to person, place, and time.   Psychiatric:         Mood and Affect: Mood and affect normal.         Behavior: Behavior normal.         Thought Content: Thought content normal.         Judgment: Judgment normal.      Result Review :  {The following data was reviewed by LASHANDA Urban on " 09/15/2023.  Common labs          10/20/2022    12:50 12/14/2022    15:09 3/4/2023    08:05   Common Labs   Glucose 72  106  96    BUN 11  16  16    Creatinine 0.93  1.08  0.83    Sodium 138  143  143    Potassium 4.4  4.6  4.4    Chloride 102  106  105    Calcium 10.5  9.9  10.3    Albumin 4.60   4.9    Total Bilirubin 0.2   0.4    Alkaline Phosphatase 97   115    AST (SGOT) 18   14    ALT (SGPT) 18   11    WBC 7.12  6.75  7.19    Hemoglobin 14.8  13.0  15.4    Hematocrit 42.8  38.2  46.2    Platelets 356  297  349    Total Cholesterol 242   250    Triglycerides 232   202    HDL Cholesterol 53   54    LDL Cholesterol  147   159                  Current Outpatient Medications on File Prior to Visit   Medication Sig Dispense Refill    aspirin 81 MG EC tablet Take 1 tablet by mouth Daily.      colchicine 0.6 MG tablet Take 1 tablet by mouth Daily. 30 tablet 1    estradiol (ESTRACE) 0.1 MG/GM vaginal cream 1 (One) Time Per Week. SATURDAYS      lidocaine (Lidoderm) 5 % Place 1 patch on the skin as directed by provider Daily. Remove & Discard patch within 12 hours or as directed by MD 30 patch 3    losartan-hydrochlorothiazide (HYZAAR) 50-12.5 MG per tablet Take 1 tablet by mouth Daily. 30 tablet 11    rosuvastatin (CRESTOR) 40 MG tablet Take 1 tablet by mouth Every Night. 90 tablet 3    tolterodine LA (DETROL LA) 2 MG 24 hr capsule Take 1 capsule by mouth Daily. PT HOLDING FOR SURGERY      carboxymethylcellulose (REFRESH PLUS) 0.5 % solution 2 drops. (Patient not taking: Reported on 9/15/2023)       No current facility-administered medications on file prior to visit.      Last Urine Toxicity          Latest Ref Rng & Units 10/20/2022   LAST URINE TOXICITY RESULTS   Amphetamine, Urine Qual Negative Negative    Barbiturates Screen, Urine Negative Negative    Benzodiazepine Screen, Urine Negative Negative    Buprenorphine, Screen, Urine Negative Negative    Cocaine Screen, Urine Negative Negative    Methadone Screen ,  Urine Negative Negative    Methamphetamine, Ur Negative Negative     Risks and benefits of opioid medication such as tolerance, dependence, addiction, misuse, abuse, diversion, opioid use disorder, accidental overdose resulting in death, drug interactions, and other systemic side effects were discussed with the patient.  Assessment and Plan  Diagnoses and all orders for this visit:    1. Follow-up exam (Primary)    2. Lupus  Comments:  Pt states she needs a new rheumatologist [facility] we will place nasal, patient is agreeable  Orders:  -     Ambulatory Referral to Rheumatology  -     traMADol (ULTRAM) 50 MG tablet; Take 1 tablet by mouth Every 6 (Six) Hours As Needed for Moderate Pain.  Dispense: 30 tablet; Refill: 0    3. Patient needing a refill for evaluation deviation radially compliancy today.  -     vitamin D (ERGOCALCIFEROL) 1.25 MG (90719 UT) capsule capsule; Take 1 capsule by mouth Every 7 (Seven) Days. WEDNESDAYS  Dispense: 13 capsule; Refill: 1    4. Acute cough  Comments:  I will prescribe albuterol inhaler, azithromycin and Tessalon Perles.  Discussed return precautions.  Patient verbalized understanding.    5. Polyarthralgia  Comments:  We will send refill of tramadol tramadol, UDS v Jonathan appropriate, patient is agreeable    Other orders  -     albuterol sulfate  (90 Base) MCG/ACT inhaler; Inhale 2 puffs Every 4 (Four) Hours As Needed for Wheezing or Shortness of Air.  Dispense: 8 g; Refill: 2  -     benzonatate (TESSALON) 200 MG capsule; Take 1 capsule by mouth 3 (Three) Times a Day As Needed for Cough.  Dispense: 30 capsule; Refill: 1  -     azithromycin (Zithromax Z-Aldo) 250 MG tablet; Take 2 tablets by mouth on day 1, then 1 tablet daily on days 2-5  Dispense: 6 tablet; Refill: 0        Follow Up   Return for If symptoms do not improve new concerning symptoms.  Patient was given instructions and counseling regarding her condition or for  health maintenance advice. Please see specific information pulled into the AVS if appropriate.       Part of this note may be electronic transcription/translation of spoken language to printed text using the Dragon dictation system

## 2023-09-27 ENCOUNTER — OFFICE VISIT (OUTPATIENT)
Dept: FAMILY MEDICINE CLINIC | Facility: CLINIC | Age: 58
End: 2023-09-27
Payer: OTHER GOVERNMENT

## 2023-09-27 VITALS
OXYGEN SATURATION: 98 % | WEIGHT: 147 LBS | DIASTOLIC BLOOD PRESSURE: 80 MMHG | SYSTOLIC BLOOD PRESSURE: 130 MMHG | TEMPERATURE: 97.4 F | HEART RATE: 82 BPM | BODY MASS INDEX: 23.07 KG/M2 | HEIGHT: 67 IN

## 2023-09-27 DIAGNOSIS — M51.35 DDD (DEGENERATIVE DISC DISEASE), THORACOLUMBAR: ICD-10-CM

## 2023-09-27 DIAGNOSIS — M54.6 LOWER THORACIC BACK PAIN: Primary | ICD-10-CM

## 2023-09-27 PROCEDURE — 99214 OFFICE O/P EST MOD 30 MIN: CPT | Performed by: NURSE PRACTITIONER

## 2023-09-27 NOTE — PROGRESS NOTES
Chief Complaint  Pain (Bilateral sciatic pain 8/10 pain scale) and Nephrology Update    Subjective        Medical History: has a past medical history of Acid reflux, Arthritis, Depression, Disease of thyroid gland, Hypothyroidism, OAB (overactive bladder), Pelvic pain, PONV (postoperative nausea and vomiting), and SOB (shortness of breath) on exertion.     Surgical History: has a past surgical history that includes Appendectomy (1983); Lymph node biopsy (Left, 2001); Cholecystectomy (2012); Laparoscopic tubal ligation (Bilateral, 1994); Abdominoplasty (2016); Knee arthroscopy (Right); Dental surgery (2008); Laparoscopy (Bilateral, 10/17/2016); Colonoscopy (2013); Laparoscopy (Right, 5/7/2018); Esophagogastroduodenoscopy (2013); Blepharoplasty (Bilateral, 12/15/2022); Head/Neck Lesion/Cyst Excision (Left, 12/15/2022); Blepharoplasty (Bilateral, 12/15/2022); and Cardiac catheterization (N/A, 3/6/2023).     Family History: family history includes Colon cancer in her maternal grandfather.     Social History: reports that she has been smoking cigarettes. She has a 15.00 pack-year smoking history. She has never used smokeless tobacco. She reports that she does not currently use alcohol. She reports that she does not use drugs.    Waleska Shah presents to [facility]  History of Present Illness  Back Pain  This is a chronic problem. The current episode started more than 1 year ago. The problem has been waxing and waning since onset. The pain is present in the thoracic spine. The pain is moderate. The pain is the same all the time. The symptoms are aggravated by bending and twisting. Stiffness is present in the morning. Pertinent negatives include no bladder incontinence, bowel incontinence or pelvic pain. Risk factors include poor posture (Macromastia). She has tried NSAIDs and heat for the symptoms. The treatment provided mild relief.  Patient was seen in March and was given lidocaine  "patches she states that they work, but she continues to have pain, she is currently seeing cardiology and pulmonology.  She was seen in urology I was reviewing patient's last CT abdomen and pelvis, when noted that it was shown patient had moderate to severe renal atherosclerosis bilaterally.  Renal functions have been normal.  Lab work.  It is concerning because patient also has severe atherosclerosis in her aorta and currently sees cardiology.  Patient is not overweight, she eats healthy, and exercises when she can, she does have physical mobility at this time due to the chronic right lower thoracic back pain.  Patient does not like to take medication, she states heat does help with the pain.  We will send over for a EMR TENS unit to help with with the pain.  Since pain has been ongoing and chronic, hoping patient will have positive results with the TENS unit and get some relief with the pain and stiffness.  Discussed with patient plan of care and what to expect, discussed if she needs help once the unit comes in to stop by the office and we can help her with that.  Patient verbalized understanding agreeable current treatment plan.  Objective   Vital Signs:   /80 (BP Location: Right arm, Patient Position: Sitting, Cuff Size: Adult)   Pulse 82   Temp 97.4 °F (36.3 °C) (Temporal)   Ht 170.2 cm (67\")   Wt 66.7 kg (147 lb)   SpO2 98%   BMI 23.02 kg/m²       Wt Readings from Last 3 Encounters:   09/27/23 66.7 kg (147 lb)   09/15/23 66.6 kg (146 lb 12.8 oz)   08/28/23 64.9 kg (143 lb)        BP Readings from Last 3 Encounters:   09/27/23 130/80   09/15/23 124/78   08/28/23 153/80        BMI is within normal parameters. No other follow-up for BMI required.       Physical Exam  Vitals reviewed.   Constitutional:       Appearance: Normal appearance. She is well-developed.   HENT:      Head: Normocephalic and atraumatic.   Eyes:      Conjunctiva/sclera: Conjunctivae normal.      Pupils: Pupils are equal, round, " and reactive to light.   Cardiovascular:      Rate and Rhythm: Normal rate and regular rhythm.      Heart sounds: No murmur heard.  Pulmonary:      Effort: Pulmonary effort is normal.      Breath sounds: Normal breath sounds. No wheezing or rhonchi.   Musculoskeletal:      Thoracic back: Spasms and tenderness present.        Back:    Skin:     General: Skin is warm and dry.   Neurological:      Mental Status: She is alert and oriented to person, place, and time.   Psychiatric:         Mood and Affect: Mood and affect normal.         Behavior: Behavior normal.         Thought Content: Thought content normal.         Judgment: Judgment normal.      Result Review :  {The following data was reviewed by LASHANDA Urban on 09/27/2023.  Common labs          10/20/2022    12:50 12/14/2022    15:09 3/4/2023    08:05   Common Labs   Glucose 72  106  96    BUN 11  16  16    Creatinine 0.93  1.08  0.83    Sodium 138  143  143    Potassium 4.4  4.6  4.4    Chloride 102  106  105    Calcium 10.5  9.9  10.3    Albumin 4.60   4.9    Total Bilirubin 0.2   0.4    Alkaline Phosphatase 97   115    AST (SGOT) 18   14    ALT (SGPT) 18   11    WBC 7.12  6.75  7.19    Hemoglobin 14.8  13.0  15.4    Hematocrit 42.8  38.2  46.2    Platelets 356  297  349    Total Cholesterol 242   250    Triglycerides 232   202    HDL Cholesterol 53   54    LDL Cholesterol  147   159      Data reviewed : Previous office note and x-ray of the thoracic spine.  X-ray of thoracic spine showed degenerative changes but no other acute bony abnormalities         XR Spine Thoracic 3 View [[id number]] (Order [id number])  Order  Status: Final result     Appointment Information    PACS Images     Radiology Images  Study Result    Narrative & Impression   PROCEDURE:  XR SPINE THORACIC 3 VW     COMPARISON: None     INDICATIONS:  right sided back pain x 1 year, nki     FINDINGS:          AP lateral and swimmer's views are submitted.  The thoracic spine is well  aligned.  The vertebral   body heights are maintained.  There is mild disc space narrowing at the T8-9, T9-10 and T10-11   levels with small non bridging endplate osteophytes.  No paraspinal widening.  No fractures or   focal osseous lesions.     IMPRESSION:               Mild degenerative changes in the lower thoracic spine.            SHASHI MCLAIN DO         Electronically Signed and Approved By: SHASHI MCLAIN DO on 3/16/2023 at 11:26                        Current Outpatient Medications on File Prior to Visit   Medication Sig Dispense Refill    albuterol sulfate  (90 Base) MCG/ACT inhaler Inhale 2 puffs Every 4 (Four) Hours As Needed for Wheezing or Shortness of Air. 8 g 2    azithromycin (Zithromax Z-Aldo) 250 MG tablet Take 2 tablets by mouth on day 1, then 1 tablet daily on days 2-5 6 tablet 0    benzonatate (TESSALON) 200 MG capsule Take 1 capsule by mouth 3 (Three) Times a Day As Needed for Cough. 30 capsule 1    carboxymethylcellulose (REFRESH PLUS) 0.5 % solution 2 drops.      colchicine 0.6 MG tablet Take 1 tablet by mouth Daily. 30 tablet 1    Cyanocobalamin (B-12 PO) Take  by mouth Daily.      estradiol (ESTRACE) 0.1 MG/GM vaginal cream 1 (One) Time Per Week. SATURDAYS      lidocaine (Lidoderm) 5 % Place 1 patch on the skin as directed by provider Daily. Remove & Discard patch within 12 hours or as directed by MD 30 patch 3    losartan-hydrochlorothiazide (HYZAAR) 50-12.5 MG per tablet Take 1 tablet by mouth Daily. 30 tablet 11    rosuvastatin (CRESTOR) 40 MG tablet Take 1 tablet by mouth Every Night. 90 tablet 3    tolterodine LA (DETROL LA) 2 MG 24 hr capsule Take 1 capsule by mouth Daily. PT HOLDING FOR SURGERY      traMADol (ULTRAM) 50 MG tablet Take 1 tablet by mouth Every 6 (Six) Hours As Needed for Moderate Pain. 30 tablet 0    vitamin D (ERGOCALCIFEROL) 1.25 MG (36627 UT) capsule capsule Take 1 capsule by mouth Every 7 (Seven) Days. WEDNESDAYS 13 capsule 1    aspirin 81 MG EC tablet  Take 1 tablet by mouth Daily. (Patient not taking: Reported on 9/27/2023)       No current facility-administered medications on file prior to visit.        Assessment and Plan  Diagnoses and all orders for this visit:    1. Lower thoracic back pain (Primary)  Comments:  We will do a trial with the TENS/EMR unit, will have patient follow-up in 2 months and see how she is doing, patient verbalized understanding.    2. DDD (degenerative disc disease), thoracolumbar        Follow Up   Return in about 6 weeks (around 11/8/2023) for Recheck.  Patient was given instructions and counseling regarding her condition or for health maintenance advice. Please see specific information pulled into the AVS if appropriate.       Part of this note may be electronic transcription/translation of spoken language to printed text using the Dragon dictation system

## 2023-11-03 ENCOUNTER — OFFICE VISIT (OUTPATIENT)
Dept: FAMILY MEDICINE CLINIC | Facility: CLINIC | Age: 58
End: 2023-11-03
Payer: OTHER GOVERNMENT

## 2023-11-03 VITALS
HEIGHT: 67 IN | HEART RATE: 74 BPM | OXYGEN SATURATION: 97 % | TEMPERATURE: 98.1 F | BODY MASS INDEX: 24.33 KG/M2 | DIASTOLIC BLOOD PRESSURE: 70 MMHG | WEIGHT: 155 LBS | SYSTOLIC BLOOD PRESSURE: 126 MMHG

## 2023-11-03 DIAGNOSIS — M54.50 CHRONIC RIGHT-SIDED LOW BACK PAIN WITHOUT SCIATICA: ICD-10-CM

## 2023-11-03 DIAGNOSIS — R05.8 COUGH PRESENT FOR GREATER THAN 3 WEEKS: Primary | ICD-10-CM

## 2023-11-03 DIAGNOSIS — G89.29 CHRONIC RIGHT-SIDED LOW BACK PAIN WITHOUT SCIATICA: ICD-10-CM

## 2023-11-03 DIAGNOSIS — M79.10 MYALGIA: ICD-10-CM

## 2023-11-03 RX ORDER — CLONAZEPAM 0.5 MG/1
TABLET ORAL
COMMUNITY
Start: 2023-10-30

## 2023-11-03 RX ORDER — GUAIFENESIN 200 MG/1
400 TABLET ORAL EVERY 4 HOURS PRN
Qty: 60 TABLET | Refills: 1 | Status: SHIPPED | OUTPATIENT
Start: 2023-11-03

## 2023-11-03 RX ORDER — FLUTICASONE FUROATE, UMECLIDINIUM BROMIDE AND VILANTEROL TRIFENATATE 100; 62.5; 25 UG/1; UG/1; UG/1
1 POWDER RESPIRATORY (INHALATION)
Qty: 30 EACH | Refills: 1 | Status: SHIPPED | OUTPATIENT
Start: 2023-11-03

## 2023-11-03 RX ORDER — DOXYCYCLINE HYCLATE 100 MG/1
100 CAPSULE ORAL 2 TIMES DAILY
Qty: 20 CAPSULE | Refills: 0 | Status: SHIPPED | OUTPATIENT
Start: 2023-11-03

## 2023-11-03 NOTE — PROGRESS NOTES
"  ENCOUNTER DATE:  11/03/2023    Waleska Shah / 58 y.o. / female      CHIEF COMPLAINT     Flank Pain (Right side) and Cough      VITALS     Visit Vitals  /70 (BP Location: Right arm, Patient Position: Sitting, Cuff Size: Adult)   Pulse 74   Temp 98.1 °F (36.7 °C) (Temporal)   Ht 170.2 cm (67\")   Wt 70.3 kg (155 lb)   LMP 04/08/2018   SpO2 97%   BMI 24.28 kg/m²       BP Readings from Last 3 Encounters:   11/03/23 126/70   09/27/23 130/80   09/15/23 124/78     Wt Readings from Last 3 Encounters:   11/03/23 70.3 kg (155 lb)   09/27/23 66.7 kg (147 lb)   09/15/23 66.6 kg (146 lb 12.8 oz)      Body mass index is 24.28 kg/m².    MEDICATIONS     Current Outpatient Medications on File Prior to Visit   Medication Sig Dispense Refill    albuterol sulfate  (90 Base) MCG/ACT inhaler Inhale 2 puffs Every 4 (Four) Hours As Needed for Wheezing or Shortness of Air. 8 g 2    carboxymethylcellulose (REFRESH PLUS) 0.5 % solution 2 drops.      clonazePAM (KlonoPIN) 0.5 MG tablet take 1/2 to ONE TABLET BY MOUTH TWICE DAILY AS NEEDED      colchicine 0.6 MG tablet Take 1 tablet by mouth Daily. 30 tablet 1    Cyanocobalamin (B-12 PO) Take  by mouth Daily.      estradiol (ESTRACE) 0.1 MG/GM vaginal cream 1 (One) Time Per Week. SATURDAYS      lidocaine (Lidoderm) 5 % Place 1 patch on the skin as directed by provider Daily. Remove & Discard patch within 12 hours or as directed by MD 30 patch 3    losartan-hydrochlorothiazide (HYZAAR) 50-12.5 MG per tablet Take 1 tablet by mouth Daily. 30 tablet 11    rosuvastatin (CRESTOR) 40 MG tablet Take 1 tablet by mouth Every Night. 90 tablet 3    tolterodine LA (DETROL LA) 2 MG 24 hr capsule Take 1 capsule by mouth Daily. PT HOLDING FOR SURGERY      traMADol (ULTRAM) 50 MG tablet Take 1 tablet by mouth Every 6 (Six) Hours As Needed for Moderate Pain. 30 tablet 0    vitamin D (ERGOCALCIFEROL) 1.25 MG (95759 UT) capsule capsule Take 1 capsule by mouth Every 7 (Seven) Days. WEDNESDAYS 13 " "capsule 1     No current facility-administered medications on file prior to visit.         HISTORY OF PRESENT ILLNESS     Waleska presents for annual health maintenance visit.  No results found for: \"HPVAPTIMA\"   Last health maintenance visit: approximately 1 year ago  General health: some medical problems  Lifestyle:  Attempting to lose weight?: No   Diet: eats decently  Exercise: generally stays active (but no regular exercise)  Tobacco: Regular use (~1/2pack/day)   Alcohol: does not drink  Work: Part-time  Reproductive health:  Terri/Postmenopausal?: Yes   Domestic abuse concerns: No   Depression Screening:          PHQ-9 Depression Screening  Little interest or pleasure in doing things?     Feeling down, depressed, or hopeless?     Trouble falling or staying asleep, or sleeping too much?     Feeling tired or having little energy?     Poor appetite or overeating?     Feeling bad about yourself - or that you are a failure or have let yourself or your family down?     Trouble concentrating on things, such as reading the newspaper or watching television?     Moving or speaking so slowly that other people could have noticed? Or the opposite - being so fidgety or restless that you have been moving around a lot more than usual?     Thoughts that you would be better off dead, or of hurting yourself in some way?     PHQ-9 Total Score     If you checked off any problems, how difficult have these problems made it for you to do your work, take care of things at home, or get along with other people?           TOMAS-7           Patient Care Team:  Erick Sandhu APRN as PCP - General (Nurse Practitioner)  Lara Bullock MD as Consulting Physician (Urology)      ALLERGIES  Allergies   Allergen Reactions    Amoxicillin Itching    Hydrocodone GI Intolerance     PT STATES SHE IS BOTHERED ONLY BY \"GENERIC\" HYDROCODONE.  ALSO GETS A HEADACHE AND BLURRED VISION WITH \"GENERIC\" HYDROCODONE    Ibuprofen Nausea And Vomiting    " Percocet [Oxycodone-Acetaminophen] Itching and Rash    Tylenol With Codeine #3 [Acetaminophen-Codeine] Nausea And Vomiting and Other (See Comments)     Vision blurry    Hydrocodone-Acetaminophen Other (See Comments)     HA AND BLURRED VISION IF USES GENERIC        PFSH:     The following portions of the patient's history were reviewed and updated as appropriate: Allergies / Current Medications / Past Medical History / Surgical History / Social History / Family History    PROBLEM LIST   Patient Active Problem List   Diagnosis    Right ovarian cyst    Exertional dyspnea    Chest pain    Coronary artery disease, non-occlusive       PAST MEDICAL HISTORY  Past Medical History:   Diagnosis Date    Acid reflux     no meds    Arthritis     rt knee    Depression     Disease of thyroid gland     low    Hypothyroidism     OAB (overactive bladder)     Pelvic pain     right side    PONV (postoperative nausea and vomiting)     SOB (shortness of breath) on exertion        SURGICAL HISTORY  Past Surgical History:   Procedure Laterality Date    ABDOMINOPLASTY  2016    APPENDECTOMY  1983    BLEPHAROPLASTY Bilateral 12/15/2022    Procedure: BILATERAL UPPER EYELID BLEPHAROPLASTY;  Surgeon: Royer Coyle MD;  Location: [facility] OR;  Service: Ophthalmology;  Laterality: Bilateral;    BLEPHAROPLASTY Bilateral 12/15/2022    Procedure: BILATERAL LOWER EYELID BLEPHAROPLASTY;  Surgeon: Royer Coyle MD;  Location: [facility] OR;  Service: Ophthalmology;  Laterality: Bilateral;  NEW IMAGE    CARDIAC CATHETERIZATION N/A 3/6/2023    Procedure: Left Heart Cath;  Surgeon: Bj Che MD;  Location: [facility] INVASIVE LOCATION;  Service: Cardiovascular;  Laterality: N/A;    CHOLECYSTECTOMY  2012    COLONOSCOPY  2013    DENTAL PROCEDURE  2008    DIAGNOSTIC LAPAROSCOPY Bilateral 10/17/2016    Procedure: LAPAROSCOPY W/ JAMAL SALPINGECTOMY LT SALPINGO-OOPHORECTOMY;  Surgeon: Bisi Donnelly MD;  Location: [facility]  OR;  Service:     DIAGNOSTIC LAPAROSCOPY Right 5/7/2018    Procedure: LAPAROSCOPIC RIGHT OVARIAN CYSTECTOMY LYSIS OF ADHESIONS LEFT ABDOMINAL WALL;  Surgeon: Bisi Donnelly MD;  Location: [facility] OR;  Service: Gynecology    ENDOSCOPY  2013    HEAD/NECK LESION/CYST EXCISION Left 12/15/2022    Procedure: LEFT EXCISION AND REPAIR OF BROW CYST;  Surgeon: Royer Coyle MD;  Location: [facility] OR;  Service: Ophthalmology;  Laterality: Left;    KNEE ARTHROSCOPY Right     X 3    LAPAROSCOPIC TUBAL LIGATION Bilateral 1994    LYMPH NODE BIOPSY Left 2001    GROIN       SOCIAL HISTORY  Social History     Socioeconomic History    Marital status:    Tobacco Use    Smoking status: Every Day     Packs/day: 0.50     Years: 30.00     Additional pack years: 0.00     Total pack years: 15.00     Types: Cigarettes    Smokeless tobacco: Never   Vaping Use    Vaping Use: Never used   Substance and Sexual Activity    Alcohol use: Not Currently    Drug use: Never    Sexual activity: Defer       FAMILY HISTORY  Family History   Problem Relation Age of Onset    Colon cancer Maternal Grandfather         50'S    Malig Hyperthermia Neg Hx        IMMUNIZATION HISTORY  Immunization History   Administered Date(s) Administered    COVID-19 (PFIZER) Purple Cap Monovalent 05/18/2021, 06/03/2021    Hepatitis B 09/17/1998, 10/19/1998, 04/09/1999       HPI  Cough  This is a new problem. The current episode started more than 1 month ago. The problem has been waxing and waning. The problem occurs every few minutes. The cough is Productive of sputum. Associated symptoms include nasal congestion, postnasal drip, rhinorrhea and shortness of breath. Pertinent negatives include no chest pain, ear congestion or ear pain. Nothing aggravates the symptoms. Risk factors for lung disease include smoking/tobacco exposure. The treatment provided no relief. Her past medical history is significant for COPD.   Flank Pain  This is a chronic  problem. The current episode started more than 1 year ago. The problem is unchanged. The pain is present in the lumbar spine. The quality of the pain is described as aching. The pain is at a severity of 8/10. The pain is moderate-severe. The symptoms are aggravated by bending, sitting and standing. Pertinent negatives include no bladder incontinence, bowel incontinence, headaches, pelvic pain or perianal numbness. Risk factors include menopause. She has tried NSAIDs and analgesics for the symptoms. The treatment provided moderate relief.       Physical Exam  Vitals reviewed.   Constitutional:       Appearance: Normal appearance. She is well-developed. She is not ill-appearing or toxic-appearing.   HENT:      Head: Normocephalic and atraumatic.      Right Ear: External ear normal.      Left Ear: External ear normal.   Eyes:      Conjunctiva/sclera: Conjunctivae normal.      Pupils: Pupils are equal, round, and reactive to light.   Cardiovascular:      Rate and Rhythm: Normal rate and regular rhythm.      Heart sounds: No murmur heard.  Pulmonary:      Effort: Pulmonary effort is normal.      Breath sounds: Normal breath sounds. No wheezing or rhonchi.   Skin:     General: Skin is warm and dry.   Neurological:      Mental Status: She is alert and oriented to person, place, and time.   Psychiatric:         Mood and Affect: Mood and affect normal.         Behavior: Behavior normal.         Thought Content: Thought content normal.         Judgment: Judgment normal.         REVIEWED DATA      Labs:    Lab Results   Component Value Date     03/04/2023    K 4.4 03/04/2023    CALCIUM 10.3 03/04/2023    AST 14 03/04/2023    ALT 11 03/04/2023    BUN 16 03/04/2023    CREATININE 0.83 03/04/2023    CREATININE 1.08 (H) 12/14/2022    CREATININE 0.93 10/20/2022    EGFRIFNONA 67 09/10/2021       Lab Results   Component Value Date    HGBA1C 5.20 09/10/2021    TSH 1.350 09/10/2021    FREET4 1.6 05/08/2020       Lab Results    Component Value Date     (H) 03/04/2023    HDL 54 03/04/2023    TRIG 202 (H) 03/04/2023    CHOLHDLRATIO 4.8 05/08/2020       Lab Results   Component Value Date    DPME80BC 55.5 10/20/2022        Lab Results   Component Value Date    WBC 7.19 03/04/2023    HGB 15.4 03/04/2023    MCV 93.5 03/04/2023     03/04/2023       Lab Results   Component Value Date    LEUKOCYTESUR Negative 05/04/2023          Lab Results   Component Value Date    HEPCVIRUSABY Non-Reactive 08/29/2023           ASSESSMENT & PLAN     Diagnoses and all orders for this visit:    1. Cough present for greater than 3 weeks (Primary)  Comments:  We will treat with doxycycline, inhaler, and Mucinex patient is agreeable  Orders:  -     XR Chest PA & Lateral; Future    2. Myalgia    3. Chronic right-sided low back pain without sciatica  Comments:  Patient brought TENS unit to office assisted patient on how to use the device.    Other orders  -     doxycycline (VIBRAMYCIN) 100 MG capsule; Take 1 capsule by mouth 2 (Two) Times a Day.  Dispense: 20 capsule; Refill: 0  -     Fluticasone-Umeclidin-Vilant (Trelegy Ellipta) 100-62.5-25 MCG/ACT inhaler; Inhale 1 puff Daily.  Dispense: 30 each; Refill: 1  -     guaiFENesin 200 MG tablet; Take 2 tablets by mouth Every 4 (Four) Hours As Needed for Cough or Congestion.  Dispense: 60 tablet; Refill: 1        HEALTHCARE MAINTENANCE ISSUES     Cancer Screening:  Colon: Initial/Next screening at age: CURRENT  Repeat colon cancer screening: every 3 years  Breast: Recommended monthly self exams; annual professional exam  Mammogram: every 1 year  Cervical: pelvic exam as recommended by gyne  Skin: Monthly self skin examination, annual exam by health professional      Lifestyle Counseling:  Lifestyle Modifications: Attempt to lose weight, Continue good lifestyle choices/modifications, and Improve dietary compliance  Safety Issues: Always wear seatbelt, Avoid texting while driving   Use sunscreen, regular skin  examination  Recommended annual dental/vision examination.  Emotional/Stress/Sleep: Reviewed and  given when appropriate    Part of this note may be electronic transcription/translation of spoken language to printed text using the Dragon dictation system

## 2023-11-03 NOTE — LETTER
November 3, 2023     Patient: Waleska Shah   YOB: 1965   Date of Visit: 11/1/2023       To Whom It May Concern:    It is my medical opinion that Waleska Shah may return to work 11/30/2023         Sincerely,        LASHANDA Urban

## 2023-11-15 ENCOUNTER — HOSPITAL ENCOUNTER (OUTPATIENT)
Dept: SLEEP MEDICINE | Facility: HOSPITAL | Age: 58
Discharge: HOME | End: 2023-11-15
Admitting: FAMILY MEDICINE
Payer: OTHER GOVERNMENT

## 2023-11-15 DIAGNOSIS — G47.30 SLEEP APNEA, UNSPECIFIED TYPE: ICD-10-CM

## 2023-11-15 DIAGNOSIS — G89.29 OTHER CHRONIC PAIN: ICD-10-CM

## 2023-11-15 DIAGNOSIS — R06.83 SNORING: ICD-10-CM

## 2023-11-15 PROCEDURE — 95810 POLYSOM 6/> YRS 4/> PARAM: CPT | Performed by: FAMILY MEDICINE

## 2023-11-15 PROCEDURE — 95810 POLYSOM 6/> YRS 4/> PARAM: CPT

## 2023-11-22 ENCOUNTER — TELEPHONE (OUTPATIENT)
Dept: SLEEP MEDICINE | Facility: HOSPITAL | Age: 58
End: 2023-11-22
Payer: OTHER GOVERNMENT

## 2023-11-22 NOTE — TELEPHONE ENCOUNTER
Called patient twice 11/22/23 around 10:36am, home phone and mobile regarding result.Could not leave message.

## 2023-12-15 DIAGNOSIS — R05.8 COUGH PRESENT FOR GREATER THAN 3 WEEKS: ICD-10-CM

## 2024-01-15 ENCOUNTER — OFFICE VISIT (OUTPATIENT)
Dept: FAMILY MEDICINE CLINIC | Facility: CLINIC | Age: 59
End: 2024-01-15
Payer: OTHER GOVERNMENT

## 2024-01-15 VITALS
SYSTOLIC BLOOD PRESSURE: 142 MMHG | BODY MASS INDEX: 23.07 KG/M2 | WEIGHT: 147 LBS | HEIGHT: 67 IN | HEART RATE: 88 BPM | OXYGEN SATURATION: 99 % | TEMPERATURE: 97.6 F | DIASTOLIC BLOOD PRESSURE: 88 MMHG

## 2024-01-15 DIAGNOSIS — N81.9 OVERACTIVE BLADDER DUE TO PROLAPSE OF FEMALE GENITAL ORGAN: ICD-10-CM

## 2024-01-15 DIAGNOSIS — J40 BRONCHITIS: ICD-10-CM

## 2024-01-15 DIAGNOSIS — N32.81 OVERACTIVE BLADDER DUE TO PROLAPSE OF FEMALE GENITAL ORGAN: ICD-10-CM

## 2024-01-15 DIAGNOSIS — R68.89 FLU-LIKE SYMPTOMS: Primary | ICD-10-CM

## 2024-01-15 DIAGNOSIS — R19.7 DIARRHEA OF PRESUMED INFECTIOUS ORIGIN: ICD-10-CM

## 2024-01-15 PROCEDURE — 87428 SARSCOV & INF VIR A&B AG IA: CPT | Performed by: NURSE PRACTITIONER

## 2024-01-15 PROCEDURE — 99214 OFFICE O/P EST MOD 30 MIN: CPT | Performed by: NURSE PRACTITIONER

## 2024-01-15 RX ORDER — IPRATROPIUM BROMIDE AND ALBUTEROL SULFATE 2.5; .5 MG/3ML; MG/3ML
3 SOLUTION RESPIRATORY (INHALATION) EVERY 4 HOURS PRN
Qty: 360 ML | Refills: 1 | Status: SHIPPED | OUTPATIENT
Start: 2024-01-15

## 2024-01-15 RX ORDER — LEVOFLOXACIN 750 MG/1
750 TABLET, FILM COATED ORAL DAILY
Qty: 7 TABLET | Refills: 0 | Status: SHIPPED | OUTPATIENT
Start: 2024-01-15

## 2024-01-15 RX ORDER — GUAIFENESIN 200 MG/1
400 TABLET ORAL EVERY 4 HOURS PRN
Qty: 60 TABLET | Refills: 1 | Status: SHIPPED | OUTPATIENT
Start: 2024-01-15

## 2024-01-15 RX ORDER — DEXTROMETHORPHAN HYDROBROMIDE AND PROMETHAZINE HYDROCHLORIDE 15; 6.25 MG/5ML; MG/5ML
5 SYRUP ORAL NIGHTLY PRN
Qty: 100 ML | Refills: 0 | Status: SHIPPED | OUTPATIENT
Start: 2024-01-15

## 2024-01-15 NOTE — PROGRESS NOTES
Chief Complaint  Cough (Persistent), Sneezing (Pt reports taking OTC Mucinex), Shortness of Breath, Headache, Diarrhea, and Nausea    Subjective        Medical History: has a past medical history of Acid reflux, Arthritis, Depression, Disease of thyroid gland, Hypothyroidism, OAB (overactive bladder), Pelvic pain, PONV (postoperative nausea and vomiting), and SOB (shortness of breath) on exertion.     Surgical History: has a past surgical history that includes Appendectomy (1983); Lymph node biopsy (Left, 2001); Cholecystectomy (2012); Laparoscopic tubal ligation (Bilateral, 1994); Abdominoplasty (2016); Knee arthroscopy (Right); Dental surgery (2008); Laparoscopy (Bilateral, 10/17/2016); Colonoscopy (2013); Laparoscopy (Right, 5/7/2018); Esophagogastroduodenoscopy (2013); Blepharoplasty (Bilateral, 12/15/2022); Head/Neck Lesion/Cyst Excision (Left, 12/15/2022); Blepharoplasty (Bilateral, 12/15/2022); and Cardiac catheterization (N/A, 3/6/2023).     Family History: family history includes Colon cancer in her maternal grandfather.     Social History: reports that she has been smoking cigarettes. She has a 15.00 pack-year smoking history. She has never used smokeless tobacco. She reports that she does not currently use alcohol. She reports that she does not use drugs.    Waleska Shah presents to [facility]  Cough  This is a new problem. The current episode started in the past 7 days. The problem has been worsening. The problem occurs every few minutes. The cough is Productive of green sputum. Associated symptoms include nasal congestion, rhinorrhea and shortness of breath. Pertinent negatives include no chest pain or fever. Treatments tried: mucinex. Her past medical history is significant for COPD.   Shortness of Breath  This is a new problem. The current episode started in the past 7 days. The problem occurs intermittently. The problem has been waxing and waning. Associated  "symptoms include orthopnea, rhinorrhea and sputum production. Pertinent negatives include no chest pain, fever, leg pain, leg swelling, syncope or vomiting. The symptoms are aggravated by weather changes and exercise. Risk factors include smoking. She has tried steroid inhalers and beta agonist inhalers for the symptoms. Her past medical history is significant for COPD.   Diarrhea   This is a new problem. The current episode started in the past 7 days. The problem occurs less than 2 times per day. The stool consistency is described as Mucous and watery. Associated symptoms include coughing and a URI. Pertinent negatives include no bloating, fever or vomiting. Nothing aggravates the symptoms. She has tried nothing for the symptoms. The treatment provided no relief. There is no history of inflammatory bowel disease or malabsorption.       Objective   Vital Signs:   /88 (BP Location: Right arm, Patient Position: Sitting, Cuff Size: Adult)   Pulse 88   Temp 97.6 °F (36.4 °C) (Oral)   Ht 170.2 cm (67\")   Wt 66.7 kg (147 lb)   SpO2 99%   BMI 23.02 kg/m²       Wt Readings from Last 3 Encounters:   01/15/24 66.7 kg (147 lb)   11/03/23 70.3 kg (155 lb)   09/27/23 66.7 kg (147 lb)        BP Readings from Last 3 Encounters:   01/15/24 142/88   11/03/23 126/70   09/27/23 130/80        BMI is within normal parameters. No other follow-up for BMI required.       Physical Exam  Vitals reviewed.   Constitutional:       Appearance: Normal appearance. She is well-developed.   HENT:      Head: Normocephalic and atraumatic.      Nose: Congestion present.      Mouth/Throat:      Comments: Post nasal drip  Eyes:      Conjunctiva/sclera: Conjunctivae normal.      Pupils: Pupils are equal, round, and reactive to light.   Cardiovascular:      Rate and Rhythm: Normal rate and regular rhythm.      Heart sounds: No murmur heard.  Pulmonary:      Effort: Pulmonary effort is normal. No accessory muscle usage, prolonged expiration or " respiratory distress.      Breath sounds: Examination of the right-upper field reveals decreased breath sounds. Examination of the right-middle field reveals decreased breath sounds. Examination of the right-lower field reveals decreased breath sounds. Decreased breath sounds present. No wheezing or rhonchi.      Comments: Coughing on exam  Skin:     General: Skin is warm and dry.   Neurological:      Mental Status: She is alert and oriented to person, place, and time.   Psychiatric:         Mood and Affect: Mood and affect normal.         Behavior: Behavior normal.         Thought Content: Thought content normal.         Judgment: Judgment normal.        Result Review :  {The following data was reviewed by LASHANDA Urban on 01/15/2024.  Common labs          3/4/2023    08:05   Common Labs   Glucose 96    BUN 16    Creatinine 0.83    Sodium 143    Potassium 4.4    Chloride 105    Calcium 10.3    Albumin 4.9    Total Bilirubin 0.4    Alkaline Phosphatase 115    AST (SGOT) 14    ALT (SGPT) 11    WBC 7.19    Hemoglobin 15.4    Hematocrit 46.2    Platelets 349    Total Cholesterol 250    Triglycerides 202    HDL Cholesterol 54    LDL Cholesterol  159                Component  Ref Range & Units 13:35  (1/15/24) 8 mo ago  (5/4/23) 1 yr ago  (11/4/22) 1 yr ago  (10/20/22) 1 yr ago  (10/20/22) 1 yr ago  (9/19/22) 1 yr ago  (8/12/22)   SARS Antigen  Not Detected, Presumptive Negative Not Detected  Not Detected       Influenza A Antigen YUSRA  Not Detected Not Detected  Not Detected       Influenza B Antigen YUSRA  Not Detected Not Detected  Not Detected       Internal Control  Passed Passed  Passed       Lot Number [id number]   Expiration Date 11/10/2024 04/31/2024 03/02/2023 07/31/2          Current Outpatient Medications on File Prior to Visit   Medication Sig Dispense Refill    albuterol sulfate  (90 Base) MCG/ACT inhaler Inhale 2 puffs Every 4 (Four) Hours  As Needed for Wheezing or Shortness of Air. 8 g 2    clonazePAM (KlonoPIN) 0.5 MG tablet take 1/2 to ONE TABLET BY MOUTH TWICE DAILY AS NEEDED      Cyanocobalamin (B-12 PO) Take  by mouth Daily.      estradiol (ESTRACE) 0.1 MG/GM vaginal cream 1 (One) Time Per Week. SATURDAYS      Fluticasone-Umeclidin-Vilant (Trelegy Ellipta) 100-62.5-25 MCG/ACT inhaler Inhale 1 puff Daily. 30 each 1    lidocaine (Lidoderm) 5 % Place 1 patch on the skin as directed by provider Daily. Remove & Discard patch within 12 hours or as directed by MD 30 patch 3    losartan-hydrochlorothiazide (HYZAAR) 50-12.5 MG per tablet Take 1 tablet by mouth Daily. 30 tablet 11    rosuvastatin (CRESTOR) 40 MG tablet Take 1 tablet by mouth Every Night. 90 tablet 3    tolterodine LA (DETROL LA) 2 MG 24 hr capsule Take 1 capsule by mouth Daily. PT HOLDING FOR SURGERY      traMADol (ULTRAM) 50 MG tablet Take 1 tablet by mouth Every 6 (Six) Hours As Needed for Moderate Pain. 30 tablet 0    vitamin D (ERGOCALCIFEROL) 1.25 MG (22412 UT) capsule capsule Take 1 capsule by mouth Every 7 (Seven) Days. WEDNESDAYS 13 capsule 1    doxycycline (VIBRAMYCIN) 100 MG capsule Take 1 capsule by mouth 2 (Two) Times a Day. 20 capsule 0    [DISCONTINUED] carboxymethylcellulose (REFRESH PLUS) 0.5 % solution 2 drops. (Patient not taking: Reported on 1/15/2024)      [DISCONTINUED] colchicine 0.6 MG tablet Take 1 tablet by mouth Daily. (Patient not taking: Reported on 1/15/2024) 30 tablet 1    [DISCONTINUED] guaiFENesin 200 MG tablet Take 2 tablets by mouth Every 4 (Four) Hours As Needed for Cough or Congestion. (Patient not taking: Reported on 1/15/2024) 60 tablet 1     No current facility-administered medications on file prior to visit.        Assessment and Plan  Diagnoses and all orders for this visit:    1. Flu-like symptoms (Primary)  -     POCT SARS-CoV-2 + Flu Antigen YUSRA    2. Overactive bladder due to prolapse of female genital organ  Comments:  Patient needing a new  [place] referral to her urogynecology  Orders:  -     Ambulatory Referral to Pediatric Obstetrics / Gynecology    3. Bronchitis    4. Diarrhea of presumed infectious origin  -     Enteric Bacterial Panel - Stool, Per Rectum; Future  -     Clostridioides difficile Toxin, PCR - Stool, Per Rectum; Future  -     Fecal Lactoferrin Qual. - Stool, Per Rectum; Future  -     H. Pylori Antigen, Stool - Stool, Per Rectum; Future  -     Enteric Bacterial Panel - Stool, Per Rectum  -     Clostridioides difficile Toxin, PCR - Stool, Per Rectum  -     Fecal Lactoferrin Qual. - Stool, Per Rectum  -     H. Pylori Antigen, Stool - Stool, Per Rectum    Other orders  -     ipratropium-albuterol (DUO-NEB) 0.5-2.5 mg/3 ml nebulizer; Take 3 mL by nebulization Every 4 (Four) Hours As Needed for Wheezing or Shortness of Air.  Dispense: 360 mL; Refill: 1  -     promethazine-dextromethorphan (PROMETHAZINE-DM) 6.25-15 MG/5ML syrup; Take 5 mL by mouth At Night As Needed for Cough (cough).  Dispense: 100 mL; Refill: 0  -     levoFLOXacin (Levaquin) 750 MG tablet; Take 1 tablet by mouth Daily.  Dispense: 7 tablet; Refill: 0  -     guaiFENesin 200 MG tablet; Take 2 tablets by mouth Every 4 (Four) Hours As Needed for Congestion or Cough.  Dispense: 60 tablet; Refill: 1    Will send patient home with a stool culture kit to collect and bring back to rule out any infectious agents.  Discussed avoiding dairy products, bland diet, patient is understanding.    Patient negative for flu and COVID, she is taking her trilogy and albuterol inhaler as instructed.  She is also been taking Mucinex at home we will send over refill Mucinex start on Levaquin 757 days Promethazine DM for nighttime relief nebulizer for DuoNebs as needed.  Discussed with patient if symptoms get worse for any reason please call office, patient verbalized understanding and agreed to treatment plan.    Follow Up   No follow-ups on file.  Patient was given instructions and counseling  regarding her condition or for health maintenance advice. Please see specific information pulled into the AVS if appropriate.       Part of this note may be electronic transcription/translation of spoken language to printed text using the Dragon dictation system

## 2024-01-16 LAB
027 TOXIN: NORMAL
C DIFF TOX GENS STL QL NAA+PROBE: NEGATIVE
H. PYLORI ANTIGEN STOOL: NEGATIVE
LACTOFERRIN STL QL LA: NEGATIVE

## 2024-01-16 PROCEDURE — 83630 LACTOFERRIN FECAL (QUAL): CPT | Performed by: NURSE PRACTITIONER

## 2024-01-16 PROCEDURE — 87493 C DIFF AMPLIFIED PROBE: CPT | Performed by: NURSE PRACTITIONER

## 2024-01-16 PROCEDURE — 87338 HPYLORI STOOL AG IA: CPT | Performed by: NURSE PRACTITIONER

## 2024-01-16 PROCEDURE — 87505 NFCT AGENT DETECTION GI: CPT | Performed by: NURSE PRACTITIONER

## 2024-01-17 LAB
C COLI+JEJ+UPSA DNA STL QL NAA+NON-PROBE: NOT DETECTED
EC STX1+STX2 GENES STL QL NAA+NON-PROBE: NOT DETECTED
S ENT+BONG DNA STL QL NAA+NON-PROBE: NOT DETECTED
SHIGELLA SP+EIEC IPAH ST NAA+NON-PROBE: NOT DETECTED

## 2024-01-17 NOTE — PROGRESS NOTES
Called Waleska hSah at [phone] (M) to relay result, however was unable to reach patient. Unable to LVM asking to please return call, patient's VM box is full and can not receive msgs at this time.

## 2024-01-18 NOTE — PROGRESS NOTES
Called and spoke to Waleska Shah to relay results. Pt verbalized understanding. No further questions/concerns at this time.

## 2024-03-07 ENCOUNTER — TRANSCRIBE ORDERS (OUTPATIENT)
Dept: ADMINISTRATIVE | Facility: HOSPITAL | Age: 59
End: 2024-03-07
Payer: OTHER GOVERNMENT

## 2024-03-07 DIAGNOSIS — J44.1 CHRONIC OBSTRUCTIVE PULMONARY DISEASE WITH ACUTE EXACERBATION: Primary | ICD-10-CM

## 2024-03-07 DIAGNOSIS — R06.00 DYSPNEA, UNSPECIFIED TYPE: ICD-10-CM

## 2024-03-13 ENCOUNTER — OFFICE VISIT (OUTPATIENT)
Dept: FAMILY MEDICINE CLINIC | Facility: CLINIC | Age: 59
End: 2024-03-13
Payer: OTHER GOVERNMENT

## 2024-03-13 VITALS
OXYGEN SATURATION: 99 % | HEART RATE: 70 BPM | WEIGHT: 141 LBS | SYSTOLIC BLOOD PRESSURE: 142 MMHG | TEMPERATURE: 97.2 F | HEIGHT: 67 IN | BODY MASS INDEX: 22.13 KG/M2 | DIASTOLIC BLOOD PRESSURE: 90 MMHG

## 2024-03-13 DIAGNOSIS — M54.6 LOWER THORACIC BACK PAIN: ICD-10-CM

## 2024-03-13 DIAGNOSIS — E78.2 MIXED HYPERLIPIDEMIA: ICD-10-CM

## 2024-03-13 DIAGNOSIS — N62 MACROMASTIA: ICD-10-CM

## 2024-03-13 DIAGNOSIS — Z00.00 ANNUAL PHYSICAL EXAM: Primary | ICD-10-CM

## 2024-03-13 DIAGNOSIS — F32.A ANXIETY AND DEPRESSION: ICD-10-CM

## 2024-03-13 DIAGNOSIS — E55.9 VITAMIN D DEFICIENCY: ICD-10-CM

## 2024-03-13 DIAGNOSIS — Z13.29 THYROID DISORDER SCREENING: ICD-10-CM

## 2024-03-13 DIAGNOSIS — F41.9 ANXIETY AND DEPRESSION: ICD-10-CM

## 2024-03-13 DIAGNOSIS — M54.2 NECK PAIN: ICD-10-CM

## 2024-03-13 LAB
25(OH)D3 SERPL-MCNC: 36.4 NG/ML (ref 30–100)
ALBUMIN SERPL-MCNC: 4.9 G/DL (ref 3.5–5.2)
ALBUMIN/GLOB SERPL: 1.7 G/DL
ALP SERPL-CCNC: 123 U/L (ref 39–117)
ALT SERPL W P-5'-P-CCNC: 6 U/L (ref 1–33)
ANION GAP SERPL CALCULATED.3IONS-SCNC: 11 MMOL/L (ref 5–15)
AST SERPL-CCNC: 12 U/L (ref 1–32)
BASOPHILS # BLD AUTO: 0.05 10*3/MM3 (ref 0–0.2)
BASOPHILS NFR BLD AUTO: 0.6 % (ref 0–1.5)
BILIRUB SERPL-MCNC: 0.4 MG/DL (ref 0–1.2)
BUN SERPL-MCNC: 14 MG/DL (ref 6–20)
BUN/CREAT SERPL: 13 (ref 7–25)
CALCIUM SPEC-SCNC: 10.7 MG/DL (ref 8.6–10.5)
CHLORIDE SERPL-SCNC: 103 MMOL/L (ref 98–107)
CHOLEST SERPL-MCNC: 288 MG/DL (ref 0–200)
CO2 SERPL-SCNC: 27 MMOL/L (ref 22–29)
CREAT SERPL-MCNC: 1.08 MG/DL (ref 0.57–1)
DEPRECATED RDW RBC AUTO: 42.9 FL (ref 37–54)
EGFRCR SERPLBLD CKD-EPI 2021: 60 ML/MIN/1.73
EOSINOPHIL # BLD AUTO: 0.04 10*3/MM3 (ref 0–0.4)
EOSINOPHIL NFR BLD AUTO: 0.5 % (ref 0.3–6.2)
ERYTHROCYTE [DISTWIDTH] IN BLOOD BY AUTOMATED COUNT: 12.5 % (ref 12.3–15.4)
GLOBULIN UR ELPH-MCNC: 2.9 GM/DL
GLUCOSE SERPL-MCNC: 96 MG/DL (ref 65–99)
HCT VFR BLD AUTO: 42.2 % (ref 34–46.6)
HDLC SERPL-MCNC: 44 MG/DL (ref 40–60)
HGB BLD-MCNC: 14.2 G/DL (ref 12–15.9)
IMM GRANULOCYTES # BLD AUTO: 0.01 10*3/MM3 (ref 0–0.05)
IMM GRANULOCYTES NFR BLD AUTO: 0.1 % (ref 0–0.5)
LDLC SERPL CALC-MCNC: 180 MG/DL (ref 0–100)
LDLC/HDLC SERPL: 4.05 {RATIO}
LYMPHOCYTES # BLD AUTO: 2.79 10*3/MM3 (ref 0.7–3.1)
LYMPHOCYTES NFR BLD AUTO: 35 % (ref 19.6–45.3)
MCH RBC QN AUTO: 31.3 PG (ref 26.6–33)
MCHC RBC AUTO-ENTMCNC: 33.6 G/DL (ref 31.5–35.7)
MCV RBC AUTO: 93 FL (ref 79–97)
MONOCYTES # BLD AUTO: 0.47 10*3/MM3 (ref 0.1–0.9)
MONOCYTES NFR BLD AUTO: 5.9 % (ref 5–12)
NEUTROPHILS NFR BLD AUTO: 4.62 10*3/MM3 (ref 1.7–7)
NEUTROPHILS NFR BLD AUTO: 57.9 % (ref 42.7–76)
NRBC BLD AUTO-RTO: 0 /100 WBC (ref 0–0.2)
PLATELET # BLD AUTO: 363 10*3/MM3 (ref 140–450)
PMV BLD AUTO: 10.2 FL (ref 6–12)
POTASSIUM SERPL-SCNC: 4.4 MMOL/L (ref 3.5–5.2)
PROT SERPL-MCNC: 7.8 G/DL (ref 6–8.5)
RBC # BLD AUTO: 4.54 10*6/MM3 (ref 3.77–5.28)
SODIUM SERPL-SCNC: 141 MMOL/L (ref 136–145)
TRIGL SERPL-MCNC: 330 MG/DL (ref 0–150)
TSH SERPL DL<=0.05 MIU/L-ACNC: 1.5 UIU/ML (ref 0.27–4.2)
VLDLC SERPL-MCNC: 64 MG/DL (ref 5–40)
WBC NRBC COR # BLD AUTO: 7.98 10*3/MM3 (ref 3.4–10.8)

## 2024-03-13 PROCEDURE — 80061 LIPID PANEL: CPT | Performed by: NURSE PRACTITIONER

## 2024-03-13 PROCEDURE — 85025 COMPLETE CBC W/AUTO DIFF WBC: CPT | Performed by: NURSE PRACTITIONER

## 2024-03-13 PROCEDURE — 80053 COMPREHEN METABOLIC PANEL: CPT | Performed by: NURSE PRACTITIONER

## 2024-03-13 PROCEDURE — 84443 ASSAY THYROID STIM HORMONE: CPT | Performed by: NURSE PRACTITIONER

## 2024-03-13 PROCEDURE — 82306 VITAMIN D 25 HYDROXY: CPT | Performed by: NURSE PRACTITIONER

## 2024-03-13 RX ORDER — DESVENLAFAXINE 25 MG/1
25 TABLET, EXTENDED RELEASE ORAL DAILY
Qty: 30 TABLET | Refills: 2 | Status: SHIPPED | OUTPATIENT
Start: 2024-03-13

## 2024-03-13 NOTE — PROGRESS NOTES
"  ENCOUNTER DATE:  03/13/2024    Waleska Shah / 58 y.o. / female      CHIEF COMPLAINT     Flank Pain and Annual Exam      VITALS     Visit Vitals  /90   Pulse 70   Temp 97.2 °F (36.2 °C)   Ht 170.2 cm (67\")   Wt 64 kg (141 lb)   LMP 04/08/2018   SpO2 99%   BMI 22.08 kg/m²       BP Readings from Last 3 Encounters:   03/13/24 142/90   01/15/24 142/88   11/03/23 126/70     Wt Readings from Last 3 Encounters:   03/13/24 64 kg (141 lb)   01/15/24 66.7 kg (147 lb)   11/03/23 70.3 kg (155 lb)      Body mass index is 22.08 kg/m².    MEDICATIONS     Current Outpatient Medications on File Prior to Visit   Medication Sig Dispense Refill    albuterol sulfate  (90 Base) MCG/ACT inhaler Inhale 2 puffs Every 4 (Four) Hours As Needed for Wheezing or Shortness of Air. 8 g 2    clonazePAM (KlonoPIN) 0.5 MG tablet take 1/2 to ONE TABLET BY MOUTH TWICE DAILY AS NEEDED      Cyanocobalamin (B-12 PO) Take  by mouth Daily.      estradiol (ESTRACE) 0.1 MG/GM vaginal cream 1 (One) Time Per Week. SATURDAYS      ipratropium-albuterol (DUO-NEB) 0.5-2.5 mg/3 ml nebulizer Take 3 mL by nebulization Every 4 (Four) Hours As Needed for Wheezing or Shortness of Air. 360 mL 1    lidocaine (Lidoderm) 5 % Place 1 patch on the skin as directed by provider Daily. Remove & Discard patch within 12 hours or as directed by MD 30 patch 3    losartan-hydrochlorothiazide (HYZAAR) 50-12.5 MG per tablet Take 1 tablet by mouth Daily. 30 tablet 11    rosuvastatin (CRESTOR) 40 MG tablet Take 1 tablet by mouth Every Night. 90 tablet 3    tolterodine LA (DETROL LA) 2 MG 24 hr capsule Take 1 capsule by mouth Daily. PT HOLDING FOR SURGERY      traMADol (ULTRAM) 50 MG tablet Take 1 tablet by mouth Every 6 (Six) Hours As Needed for Moderate Pain. 30 tablet 0    vitamin D (ERGOCALCIFEROL) 1.25 MG (82130 UT) capsule capsule Take 1 capsule by mouth Every 7 (Seven) Days. WEDNESDAYS 13 capsule 1    promethazine-dextromethorphan (PROMETHAZINE-DM) 6.25-15 MG/5ML " "syrup Take 5 mL by mouth At Night As Needed for Cough (cough). 100 mL 0    [DISCONTINUED] doxycycline (VIBRAMYCIN) 100 MG capsule Take 1 capsule by mouth 2 (Two) Times a Day. 20 capsule 0    [DISCONTINUED] Fluticasone-Umeclidin-Vilant (Trelegy Ellipta) 100-62.5-25 MCG/ACT inhaler Inhale 1 puff Daily. 30 each 1    [DISCONTINUED] guaiFENesin 200 MG tablet Take 2 tablets by mouth Every 4 (Four) Hours As Needed for Congestion or Cough. 60 tablet 1    [DISCONTINUED] levoFLOXacin (Levaquin) 750 MG tablet Take 1 tablet by mouth Daily. 7 tablet 0     No current facility-administered medications on file prior to visit.         HISTORY OF PRESENT ILLNESS     Waleska presents for annual health maintenance visit.  No results found for: \"HPVAPTIMA\"   Last health maintenance visit: approximately 1 year ago  General health: some medical problems  Lifestyle:  Attempting to lose weight?: No   Diet: eats moderately healthy  Exercise: limited physical activity due to health/physical limitations  Tobacco: Regular use (~ 1/4 pack/day)   Alcohol: does not drink  Work: Retired  Domestic abuse concerns: No   Depression Screening:          PHQ-9 Depression Screening  Little interest or pleasure in doing things?     Feeling down, depressed, or hopeless?     Trouble falling or staying asleep, or sleeping too much?     Feeling tired or having little energy?     Poor appetite or overeating?     Feeling bad about yourself - or that you are a failure or have let yourself or your family down?     Trouble concentrating on things, such as reading the newspaper or watching television?     Moving or speaking so slowly that other people could have noticed? Or the opposite - being so fidgety or restless that you have been moving around a lot more than usual?     Thoughts that you would be better off dead, or of hurting yourself in some way?     PHQ-9 Total Score     If you checked off any problems, how difficult have these problems made it for you to do " "your work, take care of things at home, or get along with other people?           TOMAS-7           Patient Care Team:  Erick Sandhu APRN as PCP - General (Nurse Practitioner)  Lara Bullock MD as Consulting Physician (Urology)      ALLERGIES  Allergies   Allergen Reactions    Amoxicillin Itching    Hydrocodone GI Intolerance     PT STATES SHE IS BOTHERED ONLY BY \"GENERIC\" HYDROCODONE.  ALSO GETS A HEADACHE AND BLURRED VISION WITH \"GENERIC\" HYDROCODONE    Ibuprofen Nausea And Vomiting    Percocet [Oxycodone-Acetaminophen] Itching and Rash    Tylenol With Codeine #3 [Acetaminophen-Codeine] Nausea And Vomiting and Other (See Comments)     Vision blurry    Hydrocodone-Acetaminophen Other (See Comments)     HA AND BLURRED VISION IF USES GENERIC        PFSH:     The following portions of the patient's history were reviewed and updated as appropriate: Allergies / Current Medications / Past Medical History / Surgical History / Social History / Family History    PROBLEM LIST   Patient Active Problem List   Diagnosis    Right ovarian cyst    Exertional dyspnea    Chest pain    Coronary artery disease, non-occlusive       PAST MEDICAL HISTORY  Past Medical History:   Diagnosis Date    Acid reflux     no meds    Arthritis     rt knee    Depression     Disease of thyroid gland     low    Hypothyroidism     OAB (overactive bladder)     Pelvic pain     right side    PONV (postoperative nausea and vomiting)     SOB (shortness of breath) on exertion        SURGICAL HISTORY  Past Surgical History:   Procedure Laterality Date    ABDOMINOPLASTY  2016    APPENDECTOMY  1983    BLEPHAROPLASTY Bilateral 12/15/2022    Procedure: BILATERAL UPPER EYELID BLEPHAROPLASTY;  Surgeon: Royer Coyle MD;  Location: [facility] OR;  Service: Ophthalmology;  Laterality: Bilateral;    BLEPHAROPLASTY Bilateral 12/15/2022    Procedure: BILATERAL LOWER EYELID BLEPHAROPLASTY;  Surgeon: Royer Coyle MD;  Location: [facility]" MAIN OR;  Service: Ophthalmology;  Laterality: Bilateral;  NEW IMAGE    CARDIAC CATHETERIZATION N/A 3/6/2023    Procedure: Left Heart Cath;  Surgeon: Bj Che MD;  Location: [facility] CATH INVASIVE LOCATION;  Service: Cardiovascular;  Laterality: N/A;    CHOLECYSTECTOMY  2012    COLONOSCOPY  2013    DENTAL PROCEDURE  2008    DIAGNOSTIC LAPAROSCOPY Bilateral 10/17/2016    Procedure: LAPAROSCOPY W/ JAMAL SALPINGECTOMY LT SALPINGO-OOPHORECTOMY;  Surgeon: Bisi Donnelly MD;  Location: [facility] OR;  Service:     DIAGNOSTIC LAPAROSCOPY Right 5/7/2018    Procedure: LAPAROSCOPIC RIGHT OVARIAN CYSTECTOMY LYSIS OF ADHESIONS LEFT ABDOMINAL WALL;  Surgeon: Bisi Donnelly MD;  Location: [facility] MAIN OR;  Service: Gynecology    ENDOSCOPY  2013    HEAD/NECK LESION/CYST EXCISION Left 12/15/2022    Procedure: LEFT EXCISION AND REPAIR OF BROW CYST;  Surgeon: Royer Coyle MD;  Location: [facility] OR;  Service: Ophthalmology;  Laterality: Left;    KNEE ARTHROSCOPY Right     X 3    LAPAROSCOPIC TUBAL LIGATION Bilateral 1994    LYMPH NODE BIOPSY Left 2001    GROIN       SOCIAL HISTORY  Social History     Socioeconomic History    Marital status:    Tobacco Use    Smoking status: Every Day     Current packs/day: 0.50     Average packs/day: 0.5 packs/day for 30.0 years (15.0 ttl pk-yrs)     Types: Cigarettes    Smokeless tobacco: Never   Vaping Use    Vaping status: Never Used   Substance and Sexual Activity    Alcohol use: Not Currently    Drug use: Never    Sexual activity: Defer       FAMILY HISTORY  Family History   Problem Relation Age of Onset    Colon cancer Maternal Grandfather         50'S    Malig Hyperthermia Neg Hx        IMMUNIZATION HISTORY  Immunization History   Administered Date(s) Administered    COVID-19 (PFIZER) Purple Cap Monovalent 05/18/2021, 06/03/2021    Hepatitis B 09/17/1998, 10/19/1998, 04/09/1999       HPI  Anxiety  Presents for initial visit. Onset was more than 5 years ago.  The problem has been rapidly worsening. Symptoms include excessive worry, muscle tension, nervous/anxious behavior and restlessness. Patient reports no suicidal ideas. Symptoms occur constantly. The severity of symptoms is causing significant distress. The symptoms are aggravated by family issues.     Risk factors include a major life event and recent illness (daughter in the hospital, just found out best friend has cancer). Past treatments include nothing.     Hyperlipidemia  This is a chronic problem. The current episode started more than 1 year ago. The problem is uncontrolled. She has no history of chronic renal disease or obesity. There are no known factors aggravating her hyperlipidemia. Pertinent negatives include no chest pain or leg pain. Current antihyperlipidemic treatment includes statins. Compliance problems: Adherence to taking medication on a regular basis.  Risk factors for coronary artery disease include post-menopausal and dyslipidemia (Atherosclerosis disease).   Vitamin D deficiency  This is a chronic problem.  Current episode has been for longer than 6 months.  Problem has gradually been improving since she has been on vitamin D supplements.  She denies any excessive fatigue, hair loss, skin changes due to the vitamin D deficiency.  She tries to eat a well-balanced diet.  She has been on the vitamin D weekly, no compliance problems.  Condition is stable.      Physical Exam  Vitals reviewed.   Constitutional:       Appearance: Normal appearance. She is well-developed.   HENT:      Head: Normocephalic and atraumatic.   Eyes:      Conjunctiva/sclera: Conjunctivae normal.      Pupils: Pupils are equal, round, and reactive to light.   Cardiovascular:      Rate and Rhythm: Normal rate and regular rhythm.      Heart sounds: No murmur heard.  Pulmonary:      Effort: Pulmonary effort is normal.      Breath sounds: Normal breath sounds. No wheezing or rhonchi.   Skin:     General: Skin is warm and dry.    Neurological:      Mental Status: She is alert and oriented to person, place, and time.   Psychiatric:         Mood and Affect: Mood and affect normal.         Behavior: Behavior normal.         Thought Content: Thought content normal.         Judgment: Judgment normal.         REVIEWED DATA      Labs:    Lab Results   Component Value Date     03/04/2023    K 4.4 03/04/2023    CALCIUM 10.3 03/04/2023    AST 14 03/04/2023    ALT 11 03/04/2023    BUN 16 03/04/2023    CREATININE 0.83 03/04/2023    CREATININE 1.08 (H) 12/14/2022    CREATININE 0.93 10/20/2022    EGFRIFNONA 67 09/10/2021       Lab Results   Component Value Date    HGBA1C 5.20 09/10/2021    TSH 1.350 09/10/2021    FREET4 1.6 05/08/2020       Lab Results   Component Value Date     (H) 03/04/2023    HDL 54 03/04/2023    TRIG 202 (H) 03/04/2023    CHOLHDLRATIO 4.8 05/08/2020       Lab Results   Component Value Date    RKIJ97DK 55.5 10/20/2022        Lab Results   Component Value Date    WBC 7.19 03/04/2023    HGB 15.4 03/04/2023    MCV 93.5 03/04/2023     03/04/2023       Lab Results   Component Value Date    LEUKOCYTESUR Negative 05/04/2023          Lab Results   Component Value Date    HEPCVIRUSABY Non-Reactive 08/29/2023           ASSESSMENT & PLAN     Diagnoses and all orders for this visit:    1. Annual physical exam (Primary)    2. Mixed hyperlipidemia  -     CBC & Differential  -     Comprehensive Metabolic Panel  -     Lipid Panel    3. Vitamin D deficiency  -     Vitamin D,25-Hydroxy    4. Thyroid disorder screening  -     TSH    5. Anxiety and depression    6. Lower thoracic back pain  -     Ambulatory Referral to Plastic Surgery    7. Macromastia  -     Ambulatory Referral to Plastic Surgery    8. Neck pain  -     Ambulatory Referral to Plastic Surgery    Other orders  -     Desvenlafaxine Succinate ER 25 MG tablet sustained-release 24 hour; Take 1 tablet by mouth Daily.  Dispense: 30 tablet; Refill: 2    Blood pressure  slightly elevated today at 142/90 she denies any headache, chest pain or change in vision.  Patient states she is under a lot more stress, her daughter is currently in the hospital and very ill and she was just notified that her best friend has stage IV metastatic cancer.  Patient was tearful in the room.  She is inquiring about medication to help her with the depression and anxiety.  She states she feels overwhelmed a lot.  No suicidal ideation.  Will start on Pristiq 25 mg discussed contacting office if needs to be increased, patient verbalized understanding.    Patient needs a updated  referral to plastic surgery she has an appointment coming up next month.  She is seeing plastic surgery due to her macromastia, she has thoracic back pain, neck pain due to the macromastia.    Patient is due for lab work will recheck labs today, adjust medications if needed.  Discussed with patient continue to keep an eye on her blood pressure.  Patient verbalized understanding agreeable treatment plan.    HEALTHCARE MAINTENANCE ISSUES     Cancer Screening:  Colon: Initial/Next screening at age: PATIENT DEFERS COLONOSCOPY OR COLOGUARD AT THIS TIME  Repeat colon cancer screening: N/A at this time  Breast: Recommended monthly self exams; annual professional exam  Mammogram: Overdue (patient declines)  Cervical: pelvic exam as recommended by gyne  Skin: Monthly self skin examination, annual exam by health professional      Lifestyle Counseling:  Lifestyle Modifications: Attempt to lose weight, Continue good lifestyle choices/modifications, and Improve dietary compliance  Safety Issues: Always wear seatbelt, Avoid texting while driving   Use sunscreen, regular skin examination  Recommended annual dental/vision examination.  Emotional/Stress/Sleep: Reviewed and  given when appropriate    Part of this note may be electronic transcription/translation of spoken language to printed text using the Dragon dictation system

## 2024-03-18 DIAGNOSIS — E78.5 HYPERLIPIDEMIA LDL GOAL <70: ICD-10-CM

## 2024-03-18 DIAGNOSIS — I25.10 CORONARY ARTERY CALCIFICATION SEEN ON CT SCAN: ICD-10-CM

## 2024-03-18 RX ORDER — EVOLOCUMAB 420 MG/3.5
420 KIT SUBCUTANEOUS
Qty: 10.5 ML | Refills: 1 | Status: SHIPPED | OUTPATIENT
Start: 2024-03-18

## 2024-03-19 ENCOUNTER — PRIOR AUTHORIZATION (OUTPATIENT)
Dept: FAMILY MEDICINE CLINIC | Facility: CLINIC | Age: 59
End: 2024-03-19
Payer: OTHER GOVERNMENT

## 2024-03-19 NOTE — TELEPHONE ENCOUNTER
CaseId:[id number];Status:Approved;Review Type:Prior Auth;Coverage Start Date:02/18/2024;Coverage End Date:12/31/2099;. Authorization Expiration Date: December 31, 2099.

## 2024-04-08 ENCOUNTER — TELEPHONE (OUTPATIENT)
Dept: FAMILY MEDICINE CLINIC | Facility: CLINIC | Age: 59
End: 2024-04-08

## 2024-04-12 ENCOUNTER — HOSPITAL ENCOUNTER (OUTPATIENT)
Dept: RESPIRATORY THERAPY | Facility: HOSPITAL | Age: 59
Discharge: HOME | End: 2024-04-12
Payer: OTHER GOVERNMENT

## 2024-04-12 ENCOUNTER — HOSPITAL ENCOUNTER (OUTPATIENT)
Dept: CT IMAGING | Facility: HOSPITAL | Age: 59
Discharge: HOME | End: 2024-04-12
Payer: OTHER GOVERNMENT

## 2024-04-12 DIAGNOSIS — R06.00 DYSPNEA, UNSPECIFIED TYPE: ICD-10-CM

## 2024-04-12 DIAGNOSIS — J44.1 CHRONIC OBSTRUCTIVE PULMONARY DISEASE WITH ACUTE EXACERBATION: ICD-10-CM

## 2024-04-12 PROCEDURE — 94729 DIFFUSING CAPACITY: CPT

## 2024-04-12 PROCEDURE — 71250 CT THORAX DX C-: CPT

## 2024-04-12 PROCEDURE — 94726 PLETHYSMOGRAPHY LUNG VOLUMES: CPT

## 2024-04-12 PROCEDURE — 94060 EVALUATION OF WHEEZING: CPT

## 2024-04-12 RX ORDER — ALBUTEROL SULFATE 2.5 MG/3ML
2.5 SOLUTION RESPIRATORY (INHALATION) ONCE
Status: COMPLETED | OUTPATIENT
Start: 2024-04-12 | End: 2024-04-12

## 2024-04-12 RX ADMIN — ALBUTEROL SULFATE 2.5 MG: 2.5 SOLUTION RESPIRATORY (INHALATION) at 12:23

## 2024-05-15 ENCOUNTER — OFFICE VISIT (OUTPATIENT)
Dept: FAMILY MEDICINE CLINIC | Facility: CLINIC | Age: 59
End: 2024-05-15
Payer: OTHER GOVERNMENT

## 2024-05-15 VITALS
HEART RATE: 79 BPM | WEIGHT: 141 LBS | OXYGEN SATURATION: 99 % | DIASTOLIC BLOOD PRESSURE: 88 MMHG | HEIGHT: 67 IN | BODY MASS INDEX: 22.13 KG/M2 | TEMPERATURE: 98 F | SYSTOLIC BLOOD PRESSURE: 138 MMHG

## 2024-05-15 DIAGNOSIS — I25.10 CORONARY ARTERY CALCIFICATION SEEN ON CT SCAN: ICD-10-CM

## 2024-05-15 DIAGNOSIS — J01.00 ACUTE NON-RECURRENT MAXILLARY SINUSITIS: Primary | ICD-10-CM

## 2024-05-15 DIAGNOSIS — E78.5 HYPERLIPIDEMIA LDL GOAL <70: ICD-10-CM

## 2024-05-15 DIAGNOSIS — M25.511 ACUTE PAIN OF RIGHT SHOULDER: ICD-10-CM

## 2024-05-15 PROCEDURE — 99214 OFFICE O/P EST MOD 30 MIN: CPT | Performed by: NURSE PRACTITIONER

## 2024-05-15 RX ORDER — DULOXETIN HYDROCHLORIDE 20 MG/1
1 CAPSULE, DELAYED RELEASE ORAL NIGHTLY
COMMUNITY
Start: 2024-04-15

## 2024-05-15 RX ORDER — AZITHROMYCIN 250 MG/1
TABLET, FILM COATED ORAL
Qty: 6 TABLET | Refills: 0 | Status: SHIPPED | OUTPATIENT
Start: 2024-05-15

## 2024-05-15 RX ORDER — FLUTICASONE PROPIONATE 50 MCG
2 SPRAY, SUSPENSION (ML) NASAL DAILY
Qty: 16 G | Refills: 1 | Status: SHIPPED | OUTPATIENT
Start: 2024-05-15

## 2024-05-15 NOTE — PROGRESS NOTES
Chief Complaint  Medication Follow Up (Desvenlafaxine Succinate ER 25 MG tablet sustained-release 24 hour), Med Management (Evolocumab with Infusor (Repatha Pushtronex System) solution cartridge), Shoulder Pain (Right intermittent, 7-8/10), and Allergies (Right side nasal pressure)    Subjective        Medical History: has a past medical history of Acid reflux, Arthritis, Depression, Disease of thyroid gland, Hypothyroidism, OAB (overactive bladder), Pelvic pain, PONV (postoperative nausea and vomiting), and SOB (shortness of breath) on exertion.     Surgical History: has a past surgical history that includes Appendectomy (1983); Lymph node biopsy (Left, 2001); Cholecystectomy (2012); Laparoscopic tubal ligation (Bilateral, 1994); Abdominoplasty (2016); Knee arthroscopy (Right); Dental surgery (2008); Laparoscopy (Bilateral, 10/17/2016); Colonoscopy (2013); Laparoscopy (Right, 5/7/2018); Esophagogastroduodenoscopy (2013); Blepharoplasty (Bilateral, 12/15/2022); Head/Neck Lesion/Cyst Excision (Left, 12/15/2022); Blepharoplasty (Bilateral, 12/15/2022); and Cardiac catheterization (N/A, 3/6/2023).     Family History: family history includes Colon cancer in her maternal grandfather.     Social History: reports that she quit smoking about 5 weeks ago. Her smoking use included cigarettes. She has a 15 pack-year smoking history. She has never used smokeless tobacco. She reports that she does not currently use alcohol. She reports that she does not use drugs.    Waleska Shah presents to [facility]  Allergies  This is a new problem. The current episode started 1 to 4 weeks ago. The problem occurs intermittently. The problem has been waxing and waning. Pertinent negatives include no abdominal pain, arthralgias, change in bowel habit or numbness. Treatments tried: right pain and pressure on right side. The treatment provided no relief.   Shoulder Injury   The right shoulder is affected.  "There was no injury mechanism. The quality of the pain is described as shooting and stabbing. The pain radiates to the right neck. The pain is mild. Pertinent negatives include no muscle weakness, numbness or tingling. Treatments tried: lidocaine patches, hot compresses. The treatment provided moderate relief.       Objective   Vital Signs:   /88 (BP Location: Left arm, Patient Position: Sitting, Cuff Size: Adult)   Pulse 79   Temp 98 °F (36.7 °C) (Infrared)   Ht 170.2 cm (67\")   Wt 64 kg (141 lb)   SpO2 99%   BMI 22.08 kg/m²       Wt Readings from Last 3 Encounters:   05/15/24 64 kg (141 lb)   03/13/24 64 kg (141 lb)   01/15/24 66.7 kg (147 lb)        BP Readings from Last 3 Encounters:   05/15/24 138/88   03/13/24 142/90   01/15/24 142/88        BMI is within normal parameters. No other follow-up for BMI required.       Physical Exam  Vitals reviewed.   Constitutional:       Appearance: Normal appearance. She is well-developed.   HENT:      Head: Normocephalic and atraumatic.      Nose: Nasal tenderness and mucosal edema present.      Right Sinus: Frontal sinus tenderness present.   Eyes:      Conjunctiva/sclera: Conjunctivae normal.      Pupils: Pupils are equal, round, and reactive to light.   Cardiovascular:      Rate and Rhythm: Normal rate and regular rhythm.      Heart sounds: No murmur heard.  Pulmonary:      Effort: Pulmonary effort is normal.      Breath sounds: Normal breath sounds. No wheezing or rhonchi.   Musculoskeletal:        Arms:       Comments: Right hand dominant   Skin:     General: Skin is warm and dry.   Neurological:      Mental Status: She is alert and oriented to person, place, and time.   Psychiatric:         Mood and Affect: Mood and affect normal.         Behavior: Behavior normal.         Thought Content: Thought content normal.         Judgment: Judgment normal.        Result Review :  {The following data was reviewed by LASHANDA Urban on 05/15/2024.  Common " labs          3/13/2024    10:50   Common Labs   Glucose 96    BUN 14    Creatinine 1.08    Sodium 141    Potassium 4.4    Chloride 103    Calcium 10.7    Albumin 4.9    Total Bilirubin 0.4    Alkaline Phosphatase 123    AST (SGOT) 12    ALT (SGPT) 6    WBC 7.98    Hemoglobin 14.2    Hematocrit 42.2    Platelets 363    Total Cholesterol 288    Triglycerides 330    HDL Cholesterol 44    LDL Cholesterol  180      Data reviewed : previous office note             Current Outpatient Medications on File Prior to Visit   Medication Sig Dispense Refill    albuterol sulfate  (90 Base) MCG/ACT inhaler Inhale 2 puffs Every 4 (Four) Hours As Needed for Wheezing or Shortness of Air. 8 g 2    clonazePAM (KlonoPIN) 0.5 MG tablet take 1/2 to ONE TABLET BY MOUTH TWICE DAILY AS NEEDED      Cyanocobalamin (B-12 PO) Take  by mouth Daily.      DULoxetine (CYMBALTA) 20 MG capsule Take 1 capsule by mouth Daily.      ipratropium-albuterol (DUO-NEB) 0.5-2.5 mg/3 ml nebulizer Take 3 mL by nebulization Every 4 (Four) Hours As Needed for Wheezing or Shortness of Air. 360 mL 1    lidocaine (Lidoderm) 5 % Place 1 patch on the skin as directed by provider Daily. Remove & Discard patch within 12 hours or as directed by MD 30 patch 3    promethazine-dextromethorphan (PROMETHAZINE-DM) 6.25-15 MG/5ML syrup Take 5 mL by mouth At Night As Needed for Cough (cough). 100 mL 0    traMADol (ULTRAM) 50 MG tablet Take 1 tablet by mouth Every 6 (Six) Hours As Needed for Moderate Pain. 30 tablet 0    vitamin D (ERGOCALCIFEROL) 1.25 MG (71627 UT) capsule capsule Take 1 capsule by mouth Every 7 (Seven) Days. WEDNESDAYS 13 capsule 1    Desvenlafaxine Succinate ER 25 MG tablet sustained-release 24 hour Take 1 tablet by mouth Daily. (Patient not taking: Reported on 5/15/2024) 30 tablet 2    estradiol (ESTRACE) 0.1 MG/GM vaginal cream 1 (One) Time Per Week. SATURDAYS (Patient not taking: Reported on 5/15/2024)      Evolocumab with Infusor (Repatha Pushtronex  System) solution cartridge Inject 3.5 mL under the skin into the appropriate area as directed Every 30 (Thirty) Days. (Patient not taking: Reported on 5/15/2024) 10.5 mL 1    losartan-hydrochlorothiazide (HYZAAR) 50-12.5 MG per tablet Take 1 tablet by mouth Daily. 30 tablet 11    rosuvastatin (CRESTOR) 40 MG tablet Take 1 tablet by mouth Every Night. (Patient not taking: Reported on 5/15/2024) 90 tablet 3    tolterodine LA (DETROL LA) 2 MG 24 hr capsule Take 1 capsule by mouth Daily. PT HOLDING FOR SURGERY (Patient not taking: Reported on 5/15/2024)       No current facility-administered medications on file prior to visit.        Assessment and Plan  Diagnoses and all orders for this visit:    1. Acute non-recurrent maxillary sinusitis (Primary)    2. Acute pain of right shoulder    3. Coronary artery calcification seen on CT scan    4. Hyperlipidemia LDL goal <70    Other orders  -     Diclofenac Sodium (VOLTAREN) 1 % gel gel; Apply 4 g topically to the appropriate area as directed 4 (Four) Times a Day As Needed (pain).  Dispense: 100 g; Refill: 1  -     fluticasone (FLONASE) 50 MCG/ACT nasal spray; 2 sprays into the nostril(s) as directed by provider Daily.  Dispense: 16 g; Refill: 1  -     azithromycin (Zithromax Z-Aldo) 250 MG tablet; Take 2 tablets by mouth on day 1, then 1 tablet daily on days 2-5  Dispense: 6 tablet; Refill: 0    Patient brought Repatha with her that was prescribed due to her continuing and hyperlipidemia despite being maxed out on Crestor.  Patient has coronary artery calcification seen on CT, discussed with patient that we started her on the Repatha to get her LDL down as low as possible.  Patient verbalized understanding.  Dose of Repatha was given to patient in office today, patient was shown how to use it she states she will just come in monthly and have medical assistant assist with injection of Repatha.    Discussed options of right shoulder pain, will hold on x-ray, no trauma was  experienced to cause the pain.  Patient given exercises of shoulder rehab, discussed to continue with lidocaine patches, will prescribe diclofenac gel to see if it assists with pain.      Patient is currently on allergy medication daily, still experiencing right-sided sinusitis pain and pressure.  Will prescribe Flonase and Z-Aldo, discussed return precaution, patient verbalized understanding.    Follow Up   Return for If symptoms do not improve new concerning symptoms.  Patient was given instructions and counseling regarding her condition or for health maintenance advice. Please see specific information pulled into the AVS if appropriate.       Part of this note may be electronic transcription/translation of spoken language to printed text using the Dragon dictation system

## 2024-05-17 ENCOUNTER — PRE-ADMISSION TESTING (OUTPATIENT)
Dept: PREADMISSION TESTING | Facility: HOSPITAL | Age: 59
End: 2024-05-17
Payer: OTHER GOVERNMENT

## 2024-05-17 VITALS
HEART RATE: 77 BPM | TEMPERATURE: 97.8 F | OXYGEN SATURATION: 99 % | WEIGHT: 141.3 LBS | RESPIRATION RATE: 20 BRPM | BODY MASS INDEX: 22.18 KG/M2 | SYSTOLIC BLOOD PRESSURE: 149 MMHG | HEIGHT: 67 IN | DIASTOLIC BLOOD PRESSURE: 83 MMHG

## 2024-05-17 LAB
ANION GAP SERPL CALCULATED.3IONS-SCNC: 12.8 MMOL/L (ref 5–15)
BACTERIA UR QL AUTO: ABNORMAL /HPF
BASOPHILS # BLD AUTO: 0.06 10*3/MM3 (ref 0–0.2)
BASOPHILS NFR BLD AUTO: 0.9 % (ref 0–1.5)
BILIRUB UR QL STRIP: NEGATIVE
BUN SERPL-MCNC: 17 MG/DL (ref 6–20)
BUN/CREAT SERPL: 21.5 (ref 7–25)
CALCIUM SPEC-SCNC: 10 MG/DL (ref 8.6–10.5)
CHLORIDE SERPL-SCNC: 105 MMOL/L (ref 98–107)
CLARITY UR: CLEAR
CO2 SERPL-SCNC: 23.2 MMOL/L (ref 22–29)
COLOR UR: YELLOW
CREAT SERPL-MCNC: 0.79 MG/DL (ref 0.57–1)
DEPRECATED RDW RBC AUTO: 42.3 FL (ref 37–54)
EGFRCR SERPLBLD CKD-EPI 2021: 87.4 ML/MIN/1.73
EOSINOPHIL # BLD AUTO: 0.06 10*3/MM3 (ref 0–0.4)
EOSINOPHIL NFR BLD AUTO: 0.9 % (ref 0.3–6.2)
ERYTHROCYTE [DISTWIDTH] IN BLOOD BY AUTOMATED COUNT: 12.7 % (ref 12.3–15.4)
GLUCOSE SERPL-MCNC: 102 MG/DL (ref 65–99)
GLUCOSE UR STRIP-MCNC: NEGATIVE MG/DL
HCT VFR BLD AUTO: 39.4 % (ref 34–46.6)
HGB BLD-MCNC: 13.3 G/DL (ref 12–15.9)
HGB UR QL STRIP.AUTO: ABNORMAL
HYALINE CASTS UR QL AUTO: ABNORMAL /LPF
IMM GRANULOCYTES # BLD AUTO: 0.01 10*3/MM3 (ref 0–0.05)
IMM GRANULOCYTES NFR BLD AUTO: 0.1 % (ref 0–0.5)
KETONES UR QL STRIP: NEGATIVE
LEUKOCYTE ESTERASE UR QL STRIP.AUTO: NEGATIVE
LYMPHOCYTES # BLD AUTO: 2.68 10*3/MM3 (ref 0.7–3.1)
LYMPHOCYTES NFR BLD AUTO: 38.9 % (ref 19.6–45.3)
MCH RBC QN AUTO: 31.1 PG (ref 26.6–33)
MCHC RBC AUTO-ENTMCNC: 33.8 G/DL (ref 31.5–35.7)
MCV RBC AUTO: 92.3 FL (ref 79–97)
MONOCYTES # BLD AUTO: 0.44 10*3/MM3 (ref 0.1–0.9)
MONOCYTES NFR BLD AUTO: 6.4 % (ref 5–12)
NEUTROPHILS NFR BLD AUTO: 3.64 10*3/MM3 (ref 1.7–7)
NEUTROPHILS NFR BLD AUTO: 52.8 % (ref 42.7–76)
NITRITE UR QL STRIP: NEGATIVE
NRBC BLD AUTO-RTO: 0 /100 WBC (ref 0–0.2)
PH UR STRIP.AUTO: 7 [PH] (ref 5–8)
PLATELET # BLD AUTO: 292 10*3/MM3 (ref 140–450)
PMV BLD AUTO: 10 FL (ref 6–12)
POTASSIUM SERPL-SCNC: 4.5 MMOL/L (ref 3.5–5.2)
PROT UR QL STRIP: NEGATIVE
QT INTERVAL: 399 MS
QTC INTERVAL: 409 MS
RBC # BLD AUTO: 4.27 10*6/MM3 (ref 3.77–5.28)
RBC # UR STRIP: ABNORMAL /HPF
REF LAB TEST METHOD: ABNORMAL
SODIUM SERPL-SCNC: 141 MMOL/L (ref 136–145)
SP GR UR STRIP: 1.01 (ref 1–1.03)
SQUAMOUS #/AREA URNS HPF: ABNORMAL /HPF
UROBILINOGEN UR QL STRIP: ABNORMAL
WBC # UR STRIP: ABNORMAL /HPF
WBC NRBC COR # BLD AUTO: 6.89 10*3/MM3 (ref 3.4–10.8)

## 2024-05-17 PROCEDURE — 85025 COMPLETE CBC W/AUTO DIFF WBC: CPT

## 2024-05-17 PROCEDURE — 36415 COLL VENOUS BLD VENIPUNCTURE: CPT

## 2024-05-17 PROCEDURE — 93005 ELECTROCARDIOGRAM TRACING: CPT

## 2024-05-17 PROCEDURE — 80048 BASIC METABOLIC PNL TOTAL CA: CPT

## 2024-05-17 PROCEDURE — 81001 URINALYSIS AUTO W/SCOPE: CPT

## 2024-05-17 NOTE — DISCHARGE INSTRUCTIONS
Take the following medications the morning of surgery:    NONE      If you are on prescription narcotic pain medication to control your pain you may also take that medication the morning of surgery.    General Instructions:  Do not eat solid food after midnight the night before surgery.  You may drink clear liquids day of surgery but must stop at least one hour before your hospital arrival time.  It is beneficial for you to have a clear drink that contains carbohydrates the day of surgery.  We suggest a 12 to 20 ounce bottle of Gatorade or Powerade for non-diabetic patients or a 12 to 20 ounce bottle of G2 or Powerade Zero for diabetic patients. (Pediatric patients, are not advised to drink a 12 to 20 ounce carbohydrate drink)    Clear liquids are liquids you can see through.  Nothing red in color.     Plain water                               Sports drinks  Sodas                                   Gelatin (Jell-O)  Fruit juices without pulp such as white grape juice and apple juice  Popsicles that contain no fruit or yogurt  Tea or coffee (no cream or milk added)  Gatorade / Powerade  G2 / Powerade Zero    Infants may have breast milk up to four hours before surgery.  Infants drinking formula may drink formula up to six hours before surgery.   Patients who avoid smoking, chewing tobacco and alcohol for 4 weeks prior to surgery have a reduced risk of post-operative complications.  Quit smoking as many days before surgery as you can.  Do not smoke, use chewing tobacco or drink alcohol the day of surgery.   If applicable bring your C-PAP/ BI-PAP machine in with you to preop day of surgery.  Bring any papers given to you in the doctor’s office.  Wear clean comfortable clothes.  Do not wear contact lenses, false eyelashes or make-up.  Bring a case for your glasses.   Bring crutches or walker if applicable.  Remove all piercings.  Leave jewelry and any other valuables at home.  Hair extensions with metal clips must be  removed prior to surgery.  The Pre-Admission Testing nurse will instruct you to bring medications if unable to obtain an accurate list in Pre-Admission Testing.      Preventing a Surgical Site Infection:  For 2 to 3 days before surgery, avoid shaving with a razor because the razor can irritate skin and make it easier to develop an infection.    Any areas of open skin can increase the risk of a post-operative wound infection by allowing bacteria to enter and travel throughout the body.  Notify your surgeon if you have any skin wounds / rashes even if it is not near the expected surgical site.  The area will need assessed to determine if surgery should be delayed until it is healed.  The night prior to surgery shower using a fresh bar of anti-bacterial soap (such as Dial) and clean washcloth.  Sleep in a clean bed with clean clothing.  Do not allow pets to sleep with you.  Shower on the morning of surgery using a fresh bar of anti-bacterial soap (such as Dial) and clean washcloth.  Dry with a clean towel and dress in clean clothing.  Ask your surgeon if you will be receiving antibiotics prior to surgery.  Make sure you, your family, and all healthcare providers clean their hands with soap and water or an alcohol based hand  before caring for you or your wound.    Day of surgery:  Your arrival time is approximately two hours before your scheduled surgery time.  Upon arrival, a Pre-op nurse and Anesthesiologist will review your health history, obtain vital signs, and answer questions you may have.  The only belongings needed at this time will be a list of your home medications and if applicable your C-PAP/BI-PAP machine.  A Pre-op nurse will start an IV and you may receive medication in preparation for surgery, including something to help you relax.     Please be aware that surgery does come with discomfort.  We want to make every effort to control your discomfort so please discuss any uncontrolled symptoms  with your nurse.   Your doctor will most likely have prescribed pain medications.      If you are going home after surgery you will receive individualized written care instructions before being discharged.  A responsible adult must drive you to and from the hospital on the day of your surgery and ideally stay with you through the night.   .  Discharge prescriptions can be filled by the hospital pharmacy during regular pharmacy hours.  If you are having surgery late in the day/evening your prescription may be e-prescribed to your pharmacy.  Please verify your pharmacy hours or chose a 24 hour pharmacy to avoid not having access to your prescription because your pharmacy has closed for the day.    If you are staying overnight following surgery, you will be transported to your hospital room following the recovery period.  [facility] has all private rooms.    If you have any questions please call Pre-Admission Testing at [phone].  Deductibles and co-payments are collected on the day of service. Please be prepared to pay the required co-pay, deductible or deposit on the day of service as defined by your plan.    Call your surgeon immediately if you experience any of the following symptoms:  Sore Throat  Shortness of Breath or difficulty breathing  Cough  Chills  Body soreness or muscle pain  Headache  Fever  New loss of taste or smell  Do not arrive for your surgery ill.  Your procedure will need to be rescheduled to another time.  You will need to call your physician before the day of surgery to avoid any unnecessary exposure to hospital staff as well as other patients.

## 2024-05-30 ENCOUNTER — OFFICE VISIT (OUTPATIENT)
Dept: FAMILY MEDICINE CLINIC | Facility: CLINIC | Age: 59
End: 2024-05-30
Payer: OTHER GOVERNMENT

## 2024-05-30 VITALS
HEART RATE: 69 BPM | WEIGHT: 141 LBS | OXYGEN SATURATION: 98 % | HEIGHT: 66 IN | DIASTOLIC BLOOD PRESSURE: 94 MMHG | BODY MASS INDEX: 22.66 KG/M2 | TEMPERATURE: 97.2 F | SYSTOLIC BLOOD PRESSURE: 158 MMHG

## 2024-05-30 DIAGNOSIS — M10.9 ACUTE GOUT OF RIGHT FOOT, UNSPECIFIED CAUSE: Primary | ICD-10-CM

## 2024-05-30 PROCEDURE — 99213 OFFICE O/P EST LOW 20 MIN: CPT | Performed by: NURSE PRACTITIONER

## 2024-05-30 RX ORDER — ALLOPURINOL 100 MG/1
100 TABLET ORAL DAILY
Qty: 30 TABLET | Refills: 1 | Status: SHIPPED | OUTPATIENT
Start: 2024-05-30 | End: 2024-06-03 | Stop reason: SDUPTHER

## 2024-05-30 RX ORDER — COLCHICINE 0.6 MG/1
0.6 TABLET ORAL 2 TIMES DAILY
Qty: 20 TABLET | Refills: 0 | Status: SHIPPED | OUTPATIENT
Start: 2024-05-30 | End: 2024-06-09

## 2024-05-30 NOTE — PROGRESS NOTES
"Chief Complaint  Foot Pain (Right Foot Pain started Monday morning 05/27/2024, pt describes foot pain as though \"somebody has went in there and cut it\", UC did imaging and prescribed crutches and prednisone. )    Subjective        Medical History: has a past medical history of Acid reflux, Arthritis, Depression, Disease of thyroid gland, Gout attack, Hypothyroidism, OAB (overactive bladder), Pelvic pain, PONV (postoperative nausea and vomiting), SOB (shortness of breath) on exertion, and Stress incontinence.     Surgical History: has a past surgical history that includes Appendectomy (1983); Lymph node biopsy (Left, 2001); Cholecystectomy (2012); Laparoscopic tubal ligation (Bilateral, 1994); Abdominoplasty (2016); Knee arthroscopy (Right); Dental surgery (2008); Laparoscopy (Bilateral, 10/17/2016); Colonoscopy (2013); Laparoscopy (Right, 5/7/2018); Esophagogastroduodenoscopy (2013); Blepharoplasty (Bilateral, 12/15/2022); Head/Neck Lesion/Cyst Excision (Left, 12/15/2022); Blepharoplasty (Bilateral, 12/15/2022); and Cardiac catheterization (N/A, 3/6/2023).     Family History: family history includes Colon cancer in her maternal grandfather.     Social History: reports that she quit smoking about 7 weeks ago. Her smoking use included cigarettes. She started smoking about 39 years ago. She has a 58.9 pack-year smoking history. She has never used smokeless tobacco. She reports that she does not currently use alcohol. She reports that she does not use drugs.    Waleska Shah presents to [facility]  History of Present Illness  Right great toe foot pain  Patient was seen at urgent care On 5/28/2024 diagnosed with pain and swelling of the right toe and gout.  Patient was given a prednisone, she comes in today for follow-up.  Patient states that she is still having pain and swelling although its only been 2 days since patient has been on steroids.  She states the pain has improved just " "slightly.  She has never had a gout flareup in the past.  She does have polyarthralgia, this is chronic in nature.    Objective   Vital Signs:   /94 (BP Location: Right arm, Patient Position: Sitting, Cuff Size: Adult)   Pulse 69   Temp 97.2 °F (36.2 °C) (Infrared)   Ht 167.6 cm (66\")   Wt 64 kg (141 lb)   SpO2 98%   BMI 22.76 kg/m²       Wt Readings from Last 3 Encounters:   05/30/24 64 kg (141 lb)   05/28/24 64 kg (141 lb)   05/17/24 64.1 kg (141 lb 4.8 oz)        BP Readings from Last 3 Encounters:   05/30/24 158/94   05/28/24 (!) 145/103   05/17/24 149/83        BMI is within normal parameters. No other follow-up for BMI required.       Physical Exam  Vitals reviewed.   Constitutional:       Appearance: Normal appearance. She is well-developed.   HENT:      Head: Normocephalic and atraumatic.   Eyes:      Conjunctiva/sclera: Conjunctivae normal.      Pupils: Pupils are equal, round, and reactive to light.   Cardiovascular:      Rate and Rhythm: Normal rate and regular rhythm.      Heart sounds: No murmur heard.  Pulmonary:      Effort: Pulmonary effort is normal.      Breath sounds: Normal breath sounds. No wheezing.   Musculoskeletal:      Comments: Pain, swelling, redness right great toe extending to the medial lateral foot, resembling gout flare   Skin:     General: Skin is warm and dry.   Neurological:      Mental Status: She is alert and oriented to person, place, and time.   Psychiatric:         Mood and Affect: Mood and affect normal.         Behavior: Behavior normal.         Thought Content: Thought content normal.         Judgment: Judgment normal.        Result Review :  {The following data was reviewed by LASHANDA Urban on 05/30/2024.  Common labs          3/13/2024    10:50 5/17/2024    09:35   Common Labs   Glucose 96  102    BUN 14  17    Creatinine 1.08  0.79    Sodium 141  141    Potassium 4.4  4.5    Chloride 103  105    Calcium 10.7  10.0    Albumin 4.9     Total " Bilirubin 0.4     Alkaline Phosphatase 123     AST (SGOT) 12     ALT (SGPT) 6     WBC 7.98  6.89    Hemoglobin 14.2  13.3    Hematocrit 42.2  39.4    Platelets 363  292    Total Cholesterol 288     Triglycerides 330     HDL Cholesterol 44     LDL Cholesterol  180       Data reviewed : Urgent care note             Current Outpatient Medications on File Prior to Visit   Medication Sig Dispense Refill    albuterol sulfate  (90 Base) MCG/ACT inhaler Inhale 2 puffs Every 4 (Four) Hours As Needed for Wheezing or Shortness of Air. 8 g 2    Cyanocobalamin (B-12 PO) Take 1 tablet by mouth Daily.      Diclofenac Sodium (VOLTAREN) 1 % gel gel Apply 4 g topically to the appropriate area as directed 4 (Four) Times a Day As Needed (pain). 100 g 1    DULoxetine (CYMBALTA) 20 MG capsule Take 1 capsule by mouth Every Night.      Evolocumab with Infusor (Repatha Pushtronex System) solution cartridge Inject 3.5 mL under the skin into the appropriate area as directed Every 30 (Thirty) Days. 10.5 mL 1    fluticasone (FLONASE) 50 MCG/ACT nasal spray 2 sprays into the nostril(s) as directed by provider Daily. (Patient taking differently: 2 sprays into the nostril(s) as directed by provider As Needed.) 16 g 1    ipratropium-albuterol (DUO-NEB) 0.5-2.5 mg/3 ml nebulizer Take 3 mL by nebulization Every 4 (Four) Hours As Needed for Wheezing or Shortness of Air. 360 mL 1    lidocaine (Lidoderm) 5 % Place 1 patch on the skin as directed by provider Daily. Remove & Discard patch within 12 hours or as directed by MD (Patient taking differently: Place 1 patch on the skin as directed by provider As Needed. Remove & Discard patch within 12 hours or as directed by MD) 30 patch 3    PREDNISOLONE PO Take 40 mg by mouth Take As Directed. TAKE FOR 5 DAYS FOR GOUT      predniSONE (DELTASONE) 20 MG tablet Take 2 tablets by mouth Daily for 5 days. 10 tablet 0    promethazine-dextromethorphan (PROMETHAZINE-DM) 6.25-15 MG/5ML syrup Take 5 mL by mouth  At Night As Needed for Cough (cough). 100 mL 0    traMADol (ULTRAM) 50 MG tablet Take 1 tablet by mouth Every 6 (Six) Hours As Needed for Moderate Pain. (Patient taking differently: Take 0.5 tablets by mouth Every 6 (Six) Hours As Needed for Moderate Pain.) 30 tablet 0    vitamin D (ERGOCALCIFEROL) 1.25 MG (32690 UT) capsule capsule Take 1 capsule by mouth Every 7 (Seven) Days. WEDNESDAYS 13 capsule 1    losartan-hydrochlorothiazide (HYZAAR) 50-12.5 MG per tablet Take 1 tablet by mouth Daily. 30 tablet 11     No current facility-administered medications on file prior to visit.        Assessment and Plan  Diagnoses and all orders for this visit:    1. Acute gout of right foot, unspecified cause (Primary)    Other orders  -     colchicine 0.6 MG tablet; Take 1 tablet by mouth 2 (Two) Times a Day for 10 days.  Dispense: 20 tablet; Refill: 0  -     allopurinol (Zyloprim) 100 MG tablet; Take 1 tablet by mouth Daily.  Dispense: 30 tablet; Refill: 1    Will start patient on colchicine twice daily for 5 days and then start allopurinol afterwards.  Discussed with patient to finish prednisone, if pain gets worse please call office or follow back up.  Patient verbalized understanding agreeable with treatment plan.    Follow Up   Return for If symptoms do not improve new concerning symptoms.  Patient was given instructions and counseling regarding her condition or for health maintenance advice. Please see specific information pulled into the AVS if appropriate.       Part of this note may be electronic transcription/translation of spoken language to printed text using the Dragon dictation system

## 2024-06-03 ENCOUNTER — ANESTHESIA EVENT (OUTPATIENT)
Dept: PERIOP | Facility: HOSPITAL | Age: 59
End: 2024-06-03
Payer: OTHER GOVERNMENT

## 2024-06-03 ENCOUNTER — HOSPITAL ENCOUNTER (OUTPATIENT)
Facility: HOSPITAL | Age: 59
Setting detail: HOSPITAL OUTPATIENT SURGERY
Discharge: HOME | End: 2024-06-03
Attending: OBSTETRICS & GYNECOLOGY | Admitting: OBSTETRICS & GYNECOLOGY
Payer: OTHER GOVERNMENT

## 2024-06-03 ENCOUNTER — TELEPHONE (OUTPATIENT)
Dept: FAMILY MEDICINE CLINIC | Facility: CLINIC | Age: 59
End: 2024-06-03
Payer: OTHER GOVERNMENT

## 2024-06-03 ENCOUNTER — ANESTHESIA (OUTPATIENT)
Dept: PERIOP | Facility: HOSPITAL | Age: 59
End: 2024-06-03
Payer: OTHER GOVERNMENT

## 2024-06-03 VITALS
BODY MASS INDEX: 23.27 KG/M2 | DIASTOLIC BLOOD PRESSURE: 79 MMHG | RESPIRATION RATE: 16 BRPM | TEMPERATURE: 97.5 F | WEIGHT: 144.8 LBS | HEART RATE: 68 BPM | SYSTOLIC BLOOD PRESSURE: 156 MMHG | OXYGEN SATURATION: 96 % | HEIGHT: 66 IN

## 2024-06-03 DIAGNOSIS — M32.8 OTHER FORMS OF SYSTEMIC LUPUS ERYTHEMATOSUS, UNSPECIFIED ORGAN INVOLVEMENT STATUS: ICD-10-CM

## 2024-06-03 PROCEDURE — 25010000002 LIDOCAINE 1 % SOLUTION: Performed by: OBSTETRICS & GYNECOLOGY

## 2024-06-03 PROCEDURE — 25010000002 MIDAZOLAM PER 1 MG: Performed by: STUDENT IN AN ORGANIZED HEALTH CARE EDUCATION/TRAINING PROGRAM

## 2024-06-03 PROCEDURE — 25010000002 KETOROLAC TROMETHAMINE PER 15 MG: Performed by: ANESTHESIOLOGY

## 2024-06-03 PROCEDURE — 25810000003 LACTATED RINGERS PER 1000 ML: Performed by: STUDENT IN AN ORGANIZED HEALTH CARE EDUCATION/TRAINING PROGRAM

## 2024-06-03 PROCEDURE — 25010000002 CEFAZOLIN PER 500 MG: Performed by: OBSTETRICS & GYNECOLOGY

## 2024-06-03 PROCEDURE — 25010000002 PROPOFOL 200 MG/20ML EMULSION: Performed by: ANESTHESIOLOGY

## 2024-06-03 PROCEDURE — 25010000002 FENTANYL CITRATE (PF) 50 MCG/ML SOLUTION: Performed by: ANESTHESIOLOGY

## 2024-06-03 PROCEDURE — C1771 REP DEV, URINARY, W/SLING: HCPCS | Performed by: OBSTETRICS & GYNECOLOGY

## 2024-06-03 PROCEDURE — 25010000002 DEXAMETHASONE SODIUM PHOSPHATE 20 MG/5ML SOLUTION: Performed by: ANESTHESIOLOGY

## 2024-06-03 DEVICE — SIS SYSTEM
Type: IMPLANTABLE DEVICE | Site: VAGINA | Status: FUNCTIONAL
Brand: SOLYX™ BLUE

## 2024-06-03 RX ORDER — SODIUM CHLORIDE 0.9 % (FLUSH) 0.9 %
3 SYRINGE (ML) INJECTION EVERY 12 HOURS SCHEDULED
Status: DISCONTINUED | OUTPATIENT
Start: 2024-06-03 | End: 2024-06-03 | Stop reason: HOSPADM

## 2024-06-03 RX ORDER — PROMETHAZINE HYDROCHLORIDE 25 MG/1
25 TABLET ORAL ONCE AS NEEDED
Status: DISCONTINUED | OUTPATIENT
Start: 2024-06-03 | End: 2024-06-03 | Stop reason: HOSPADM

## 2024-06-03 RX ORDER — DEXAMETHASONE SODIUM PHOSPHATE 4 MG/ML
INJECTION, SOLUTION INTRA-ARTICULAR; INTRALESIONAL; INTRAMUSCULAR; INTRAVENOUS; SOFT TISSUE AS NEEDED
Status: DISCONTINUED | OUTPATIENT
Start: 2024-06-03 | End: 2024-06-03 | Stop reason: SURG

## 2024-06-03 RX ORDER — FENTANYL CITRATE 50 UG/ML
INJECTION, SOLUTION INTRAMUSCULAR; INTRAVENOUS AS NEEDED
Status: DISCONTINUED | OUTPATIENT
Start: 2024-06-03 | End: 2024-06-03 | Stop reason: SURG

## 2024-06-03 RX ORDER — EPHEDRINE SULFATE 50 MG/ML
5 INJECTION, SOLUTION INTRAVENOUS ONCE AS NEEDED
Status: DISCONTINUED | OUTPATIENT
Start: 2024-06-03 | End: 2024-06-03 | Stop reason: HOSPADM

## 2024-06-03 RX ORDER — KETOROLAC TROMETHAMINE 30 MG/ML
INJECTION, SOLUTION INTRAMUSCULAR; INTRAVENOUS AS NEEDED
Status: DISCONTINUED | OUTPATIENT
Start: 2024-06-03 | End: 2024-06-03 | Stop reason: SURG

## 2024-06-03 RX ORDER — DIPHENHYDRAMINE HYDROCHLORIDE 50 MG/ML
12.5 INJECTION INTRAMUSCULAR; INTRAVENOUS
Status: DISCONTINUED | OUTPATIENT
Start: 2024-06-03 | End: 2024-06-03 | Stop reason: HOSPADM

## 2024-06-03 RX ORDER — DROPERIDOL 2.5 MG/ML
0.62 INJECTION, SOLUTION INTRAMUSCULAR; INTRAVENOUS
Status: DISCONTINUED | OUTPATIENT
Start: 2024-06-03 | End: 2024-06-03 | Stop reason: HOSPADM

## 2024-06-03 RX ORDER — FLUMAZENIL 0.1 MG/ML
0.2 INJECTION INTRAVENOUS AS NEEDED
Status: DISCONTINUED | OUTPATIENT
Start: 2024-06-03 | End: 2024-06-03 | Stop reason: HOSPADM

## 2024-06-03 RX ORDER — LIDOCAINE HYDROCHLORIDE 10 MG/ML
INJECTION, SOLUTION INFILTRATION; PERINEURAL AS NEEDED
Status: DISCONTINUED | OUTPATIENT
Start: 2024-06-03 | End: 2024-06-03 | Stop reason: HOSPADM

## 2024-06-03 RX ORDER — MAGNESIUM HYDROXIDE 1200 MG/15ML
LIQUID ORAL AS NEEDED
Status: DISCONTINUED | OUTPATIENT
Start: 2024-06-03 | End: 2024-06-03 | Stop reason: HOSPADM

## 2024-06-03 RX ORDER — PROPOFOL 10 MG/ML
INJECTION, EMULSION INTRAVENOUS AS NEEDED
Status: DISCONTINUED | OUTPATIENT
Start: 2024-06-03 | End: 2024-06-03 | Stop reason: SURG

## 2024-06-03 RX ORDER — INDOMETHACIN 25 MG/1
25-50 CAPSULE ORAL 2 TIMES DAILY WITH MEALS
Qty: 60 CAPSULE | Refills: 2 | Status: SHIPPED | OUTPATIENT
Start: 2024-06-03

## 2024-06-03 RX ORDER — IPRATROPIUM BROMIDE AND ALBUTEROL SULFATE 2.5; .5 MG/3ML; MG/3ML
3 SOLUTION RESPIRATORY (INHALATION) ONCE AS NEEDED
Status: DISCONTINUED | OUTPATIENT
Start: 2024-06-03 | End: 2024-06-03 | Stop reason: HOSPADM

## 2024-06-03 RX ORDER — HYDROMORPHONE HYDROCHLORIDE 1 MG/ML
0.5 INJECTION, SOLUTION INTRAMUSCULAR; INTRAVENOUS; SUBCUTANEOUS
Status: DISCONTINUED | OUTPATIENT
Start: 2024-06-03 | End: 2024-06-03 | Stop reason: HOSPADM

## 2024-06-03 RX ORDER — LIDOCAINE HYDROCHLORIDE 20 MG/ML
INJECTION, SOLUTION INFILTRATION; PERINEURAL AS NEEDED
Status: DISCONTINUED | OUTPATIENT
Start: 2024-06-03 | End: 2024-06-03 | Stop reason: SURG

## 2024-06-03 RX ORDER — FENTANYL CITRATE 50 UG/ML
50 INJECTION, SOLUTION INTRAMUSCULAR; INTRAVENOUS
Status: DISCONTINUED | OUTPATIENT
Start: 2024-06-03 | End: 2024-06-03 | Stop reason: HOSPADM

## 2024-06-03 RX ORDER — PROMETHAZINE HYDROCHLORIDE 25 MG/1
25 SUPPOSITORY RECTAL ONCE AS NEEDED
Status: DISCONTINUED | OUTPATIENT
Start: 2024-06-03 | End: 2024-06-03 | Stop reason: HOSPADM

## 2024-06-03 RX ORDER — LIDOCAINE HYDROCHLORIDE 10 MG/ML
0.5 INJECTION, SOLUTION INFILTRATION; PERINEURAL ONCE AS NEEDED
Status: DISCONTINUED | OUTPATIENT
Start: 2024-06-03 | End: 2024-06-03 | Stop reason: HOSPADM

## 2024-06-03 RX ORDER — ULTRASOUND COUPLING MEDIUM
GEL (GRAM) TOPICAL AS NEEDED
Status: DISCONTINUED | OUTPATIENT
Start: 2024-06-03 | End: 2024-06-03 | Stop reason: HOSPADM

## 2024-06-03 RX ORDER — SODIUM CHLORIDE, SODIUM LACTATE, POTASSIUM CHLORIDE, CALCIUM CHLORIDE 600; 310; 30; 20 MG/100ML; MG/100ML; MG/100ML; MG/100ML
9 INJECTION, SOLUTION INTRAVENOUS CONTINUOUS
Status: DISCONTINUED | OUTPATIENT
Start: 2024-06-03 | End: 2024-06-03 | Stop reason: HOSPADM

## 2024-06-03 RX ORDER — LABETALOL HYDROCHLORIDE 5 MG/ML
5 INJECTION, SOLUTION INTRAVENOUS
Status: DISCONTINUED | OUTPATIENT
Start: 2024-06-03 | End: 2024-06-03 | Stop reason: HOSPADM

## 2024-06-03 RX ORDER — NALOXONE HCL 0.4 MG/ML
0.2 VIAL (ML) INJECTION AS NEEDED
Status: DISCONTINUED | OUTPATIENT
Start: 2024-06-03 | End: 2024-06-03 | Stop reason: HOSPADM

## 2024-06-03 RX ORDER — ALLOPURINOL 100 MG/1
100 TABLET ORAL 2 TIMES DAILY
Qty: 60 TABLET | Refills: 1 | Status: SHIPPED | OUTPATIENT
Start: 2024-06-03

## 2024-06-03 RX ORDER — SCOLOPAMINE TRANSDERMAL SYSTEM 1 MG/1
1 PATCH, EXTENDED RELEASE TRANSDERMAL ONCE
Status: DISCONTINUED | OUTPATIENT
Start: 2024-06-03 | End: 2024-06-03 | Stop reason: HOSPADM

## 2024-06-03 RX ORDER — HYDRALAZINE HYDROCHLORIDE 20 MG/ML
5 INJECTION INTRAMUSCULAR; INTRAVENOUS
Status: DISCONTINUED | OUTPATIENT
Start: 2024-06-03 | End: 2024-06-03 | Stop reason: HOSPADM

## 2024-06-03 RX ORDER — SODIUM CHLORIDE 0.9 % (FLUSH) 0.9 %
3-10 SYRINGE (ML) INJECTION AS NEEDED
Status: DISCONTINUED | OUTPATIENT
Start: 2024-06-03 | End: 2024-06-03 | Stop reason: HOSPADM

## 2024-06-03 RX ORDER — MIDAZOLAM HYDROCHLORIDE 1 MG/ML
1 INJECTION INTRAMUSCULAR; INTRAVENOUS
Status: DISCONTINUED | OUTPATIENT
Start: 2024-06-03 | End: 2024-06-03 | Stop reason: HOSPADM

## 2024-06-03 RX ADMIN — Medication 3 ML: at 06:52

## 2024-06-03 RX ADMIN — FENTANYL CITRATE 50 MCG: 50 INJECTION, SOLUTION INTRAMUSCULAR; INTRAVENOUS at 07:05

## 2024-06-03 RX ADMIN — PROPOFOL 200 MG: 10 INJECTION, EMULSION INTRAVENOUS at 07:06

## 2024-06-03 RX ADMIN — KETOROLAC TROMETHAMINE 30 MG: 30 INJECTION, SOLUTION INTRAMUSCULAR at 07:06

## 2024-06-03 RX ADMIN — DEXAMETHASONE SODIUM PHOSPHATE 4 MG: 4 INJECTION, SOLUTION INTRAMUSCULAR; INTRAVENOUS at 07:12

## 2024-06-03 RX ADMIN — MIDAZOLAM 1 MG: 1 INJECTION INTRAMUSCULAR; INTRAVENOUS at 06:51

## 2024-06-03 RX ADMIN — SCOPALAMINE 1 PATCH: 1 PATCH, EXTENDED RELEASE TRANSDERMAL at 06:50

## 2024-06-03 RX ADMIN — SODIUM CHLORIDE, POTASSIUM CHLORIDE, SODIUM LACTATE AND CALCIUM CHLORIDE 9 ML/HR: 600; 310; 30; 20 INJECTION, SOLUTION INTRAVENOUS at 06:50

## 2024-06-03 RX ADMIN — SODIUM CHLORIDE 2000 MG: 900 INJECTION INTRAVENOUS at 06:53

## 2024-06-03 RX ADMIN — LIDOCAINE HYDROCHLORIDE 60 MG: 20 INJECTION, SOLUTION INFILTRATION; PERINEURAL at 07:06

## 2024-06-03 NOTE — ANESTHESIA PROCEDURE NOTES
Airway  Urgency: elective    Date/Time: 6/3/2024 7:08 AM  Airway not difficult    General Information and Staff    Patient location during procedure: OR  Anesthesiologist: Kady Aguero MD    Indications and Patient Condition  Indications for airway management: airway protection    Preoxygenated: yes  Mask difficulty assessment: 0 - not attempted    Final Airway Details  Final airway type: supraglottic airway      Successful airway: classic  Size 4     Number of attempts at approach: 1  Assessment: lips, teeth, and gum same as pre-op and atraumatic intubation

## 2024-06-03 NOTE — TELEPHONE ENCOUNTER
Pt's spouse came into the office for pt stating foot is not any better and would like these medications Indomethacin 25 mg , allopurinol 300mg and Prednisone 20mg to be sent in .

## 2024-06-03 NOTE — ANESTHESIA POSTPROCEDURE EVALUATION
"Patient: Waleska Shah    Procedure Summary       Date: 06/03/24 Room / Location: [facility] OR [address] [place] [address] MAIN OR    Anesthesia Start: 0658 Anesthesia Stop: 0741    Procedure: TENSION FREE VAGINAL TAPING (Vagina) Diagnosis:     Surgeons: Julissa Robin MD Provider: Kady Aguero MD    Anesthesia Type: general ASA Status: 2            Anesthesia Type: general    Vitals  Vitals Value Taken Time   /93 06/03/24 0800   Temp 36.4 °C (97.5 °F) 06/03/24 0742   Pulse 74 06/03/24 0807   Resp 16 06/03/24 0800   SpO2 99 % 06/03/24 0807   Vitals shown include unfiled device data.        Post Anesthesia Care and Evaluation    Patient location during evaluation: PACU  Patient participation: complete - patient participated  Level of consciousness: awake and alert  Pain management: adequate    Airway patency: patent  Anesthetic complications: No anesthetic complications  PONV Status: controlled  Cardiovascular status: acceptable and hemodynamically stable  Respiratory status: acceptable  Hydration status: acceptable    Comments: /84   Pulse 72   Temp 36.4 °C (97.5 °F) (Oral)   Resp 16   Ht 167.6 cm (66\")   Wt 65.7 kg (144 lb 12.8 oz)   LMP 04/08/2018   SpO2 98%   BMI 23.37 kg/m²     "

## 2024-06-03 NOTE — ANESTHESIA PREPROCEDURE EVALUATION
Anesthesia Evaluation     Patient summary reviewed and Nursing notes reviewed   history of anesthetic complications:  PONV  NPO Solid Status: > 8 hours  NPO Liquid Status: > 2 hours           Airway   Mallampati: II  TM distance: >3 FB  Neck ROM: full  Dental    (+) upper dentures and lower dentures    Pulmonary    (+) a smoker Former,  Cardiovascular     ECG reviewed    (+) CAD  (-) hypertension, valvular problems/murmurs, past MI, dysrhythmias, angina, CHF, cardiac stents, CABG    ROS comment: Cardiac Cath - mild nonobstructive CAD, Normal EF    Neuro/Psych  (+) psychiatric history Depression  GI/Hepatic/Renal/Endo    (+) GERD well controlled, thyroid problem hypothyroidism    Musculoskeletal     Abdominal    Substance History - negative use     OB/GYN negative ob/gyn ROS         Other   arthritis,                       Anesthesia Plan    ASA 2     general     intravenous induction     Anesthetic plan, risks, benefits, and alternatives have been provided, discussed and informed consent has been obtained with: patient.        CODE STATUS:

## 2024-06-03 NOTE — DISCHARGE INSTRUCTIONS
Scopolamine Patch  This patch has been applied to the skin behind one of your ears.  It may stay in place up to 24 hours. You may remove it at any time after your surgery; however, it should be removed after you are up and walking around the next day.  This medicine reduces stomach upset. Side effects may include: dry mouth, dizziness, sleepiness, constipation, or upset stomach.  An allergy would show up as: a rash, itching, wheezing or shortness of breath.  Follow these instructions:  Do not drink alcohol, drive or operate machinery while taking this medicine.  Wear only 1 patch at a time. You can leave the patch on for up to 24 hours.  When you remove the patch, fold it in half with the sticky sides together and throw it away. Wash your hands and the area under the patch.  Do not touch your eye with your hand if it has touched the patch.  Wash your hands well before and after touching the patch.  Sit or stand slowly to avoid dizziness.  Call your doctor if you have:  Any sign of allergy  No relief  Trouble passing urine  Any new or severe symptoms      YOU WILL BE ON PELVIC REST FOR THE NEXT 4  WEEKS OR UNTIL SPECIFIED BY YOUR PHYSICIAN. PELVIC REST INCLUDES:  Avoiding long periods of standing.  Avoiding heavy lifting, pushing or pulling.  DO NOT lift anything heavier than 10 pounds (4.5 kg)  DO NOT douche, use tampons, or have sex (intercourse) for 4 weeks after the procedure.

## 2024-06-03 NOTE — H&P
Patient Care Team:  Erick Sandhu APRN as PCP - General (Nurse Practitioner)  Lara Bullock MD as Consulting Physician (Urology)    Chief complaint   Pt with long hx of GSUI confirmed with urodynamic testing.   History  Past Medical History:   Diagnosis Date    Acid reflux     no meds    Arthritis     rt knee    Depression     Disease of thyroid gland     low    Gout attack     RT TOE    Hypothyroidism     OAB (overactive bladder)     Pelvic pain     right side    PONV (postoperative nausea and vomiting)     STATES WOULD LIKE SCOP PATCH AM OF SURGERY    SOB (shortness of breath) on exertion     Stress incontinence     DAILY PAD     Past Surgical History:   Procedure Laterality Date    ABDOMINOPLASTY  2016    APPENDECTOMY  1983    BLEPHAROPLASTY Bilateral 12/15/2022    Procedure: BILATERAL UPPER EYELID BLEPHAROPLASTY;  Surgeon: Royer Coyle MD;  Location: [facility];  Service: Ophthalmology;  Laterality: Bilateral;    BLEPHAROPLASTY Bilateral 12/15/2022    Procedure: BILATERAL LOWER EYELID BLEPHAROPLASTY;  Surgeon: Royer Coyle MD;  Location: [facility];  Service: Ophthalmology;  Laterality: Bilateral;  NEW IMAGE    CARDIAC CATHETERIZATION N/A 3/6/2023    Procedure: Left Heart Cath;  Surgeon: Bj Che MD;  Location: [facility] CATH INVASIVE [facility];  Service: Cardiovascular;  Laterality: N/A;    CHOLECYSTECTOMY  2012    COLONOSCOPY  2013    DENTAL PROCEDURE  2008    DIAGNOSTIC LAPAROSCOPY Bilateral 10/17/2016    Procedure: LAPAROSCOPY W/ JAMAL SALPINGECTOMY LT SALPINGO-OOPHORECTOMY;  Surgeon: Bisi Donnelly MD;  Location: [facility];  Service:     DIAGNOSTIC LAPAROSCOPY Right 5/7/2018    Procedure: LAPAROSCOPIC RIGHT OVARIAN CYSTECTOMY LYSIS OF ADHESIONS LEFT ABDOMINAL WALL;  Surgeon: Bisi Donnelly MD;  Location: [facility];  Service: Gynecology    ENDOSCOPY  2013    HEAD/NECK LESION/CYST EXCISION Left 12/15/2022    Procedure: LEFT EXCISION AND  REPAIR OF BROW CYST;  Surgeon: Royer Coyle MD;  Location: [facility] MAIN OR;  Service: Ophthalmology;  Laterality: Left;    KNEE ARTHROSCOPY Right     X 3    LAPAROSCOPIC TUBAL LIGATION Bilateral     LYMPH NODE BIOPSY Left     GROIN     Medications Prior to Admission   Medication Sig Dispense Refill Last Dose    albuterol sulfate  (90 Base) MCG/ACT inhaler Inhale 2 puffs Every 4 (Four) Hours As Needed for Wheezing or Shortness of Air. 8 g 2 Past Month    colchicine 0.6 MG tablet Take 1 tablet by mouth 2 (Two) Times a Day for 10 days. 20 tablet 0 2024    Cyanocobalamin (B-12 PO) Take 1 tablet by mouth Daily.   2024    Diclofenac Sodium (VOLTAREN) 1 % gel gel Apply 4 g topically to the appropriate area as directed 4 (Four) Times a Day As Needed (pain). 100 g 1 Past Month    DULoxetine (CYMBALTA) 20 MG capsule Take 1 capsule by mouth Every Night.   2024    Evolocumab with Infusor (Repatha Pushtronex System) solution cartridge Inject 3.5 mL under the skin into the appropriate area as directed Every 30 (Thirty) Days. 10.5 mL 1 5/15/2024    fluticasone (FLONASE) 50 MCG/ACT nasal spray 2 sprays into the nostril(s) as directed by provider Daily. (Patient taking differently: 2 sprays into the nostril(s) as directed by provider As Needed.) 16 g 1 Past Month    ipratropium-albuterol (DUO-NEB) 0.5-2.5 mg/3 ml nebulizer Take 3 mL by nebulization Every 4 (Four) Hours As Needed for Wheezing or Shortness of Air. 360 mL 1     lidocaine (Lidoderm) 5 % Place 1 patch on the skin as directed by provider Daily. Remove & Discard patch within 12 hours or as directed by MD (Patient taking differently: Place 1 patch on the skin as directed by provider As Needed. Remove & Discard patch within 12 hours or as directed by MD) 30 patch 3 Past Month    [] predniSONE (DELTASONE) 20 MG tablet Take 2 tablets by mouth Daily for 5 days. 10 tablet 0 2024    promethazine-dextromethorphan (PROMETHAZINE-DM)  6.25-15 MG/5ML syrup Take 5 mL by mouth At Night As Needed for Cough (cough). 100 mL 0     traMADol (ULTRAM) 50 MG tablet Take 1 tablet by mouth Every 6 (Six) Hours As Needed for Moderate Pain. (Patient taking differently: Take 0.5 tablets by mouth Every 6 (Six) Hours As Needed for Moderate Pain.) 30 tablet 0     vitamin D (ERGOCALCIFEROL) 1.25 MG (26636 UT) capsule capsule Take 1 capsule by mouth Every 7 (Seven) Days. WEDNESDAYS 13 capsule 1 5/15/2024    allopurinol (Zyloprim) 100 MG tablet Take 1 tablet by mouth Daily. 30 tablet 1 Unknown    losartan-hydrochlorothiazide (HYZAAR) 50-12.5 MG per tablet Take 1 tablet by mouth Daily. 30 tablet 11     PREDNISOLONE PO Take 40 mg by mouth Take As Directed. TAKE FOR 5 DAYS FOR GOUT        Allergies:  Amoxicillin, Hydrocodone, Ibuprofen, Percocet [oxycodone-acetaminophen], and Tylenol with codeine #3 [acetaminophen-codeine]    Objective     Vital Signs  Temp:  [98.1 °F (36.7 °C)] 98.1 °F (36.7 °C)  Heart Rate:  [64] 64  Resp:  [15] 15  BP: (147)/(94) 147/94    Physical Exam:    No exam performed today,    Results Review:    I reviewed the patient's new clinical results.    Assessment & Plan       * No active hospital problems. *    GSUI here for definitive therapy with TVT. She understands risks/recovery. She understands a mesh is being placed.    I discussed the patients findings and my recommendations with patient.     Julissa Robin MD  06/03/24  06:53 EDT

## 2024-06-04 NOTE — OP NOTE
Subjective     Date of Service:   6/3/2024      Surgical Staff: Surgeons and Role:     * Julissa Robin MD - Primary       Pre-operative diagnosis(es): TONY     Post-operative diagnosis(es): * No Diagnosis Codes entered *  SAME   Procedure(s): Procedure(s):  TENSION FREE VAGINAL TAPING, cystocopy     Antibiotics: cefazolin (Ancef) ordered on call to OR     Anesthesia: Type: General  ASA:  II     Objective      Operative findings: Normal bladder mucosa   Specimens removed: None         Output: Documented Output  Est. Blood Loss 50mL  Urine Output mL  Other Output mL  NG/OG Output mL    I/O this shift:  In: -   Out: 400 [Urine:400]     Blood products used: No   Drains: [REMOVED] Urethral Catheter Silicone 16 Fr. (Removed)      Implant Information: Implant Name Type Inv. Item Serial No.  Lot No. LRB No. Used Action   SYS SLNG MID/URETH SOLYX SGL/INCSN JUAN - [id number] Implant SYS SLNG MID/URETH SOLYX SGL/INCSN JUAN  [id number] [id number] N/A 1 Implanted      Complications: none   Intraoperative consult(s):    Condition: stable   Disposition:          Assessment & Plan     Patient was taken to the operating room where LMA was obtained without difficulty. She was placed in the dorsal lithotomy position in universal destiney stirrups and prepped and draped in the standard sterile fashion. 16 french catheter was been placed in the bladder. 2 allis clamps were placed on either side of mid urethra. 1 percent plain lidocaine was used to inject the anterior vagina. a 15 blade scalpel was used to make vaginal flaps. metzenbaum scissors were used to make a tunnel underneath the pubic bone. . Using a Sistemiciv Solyx TVT tape, the tracers were placed in the flaps that had been created and pushed through the periurethral tissue until the balck line was at the mucosal edge.  The tracer was placed in the contralateral position and angle 45 and pushed through to the obturator internus muscle.  This was  done in a similar fashion on the opposite side. 70 degree cystoscope was placed in the bladder and the bladder mucosa was completely normal. Care was taken to make sure there was no pressure under the urethra by aid of heavy scissors. vaginal incision was closed with a 2'0 monocryl. all counts were correct.               Julissa Robin MD  06/04/24  18:21 EDT

## 2024-06-12 ENCOUNTER — TREATMENT (OUTPATIENT)
Dept: FAMILY MEDICINE CLINIC | Facility: CLINIC | Age: 59
End: 2024-06-12
Payer: OTHER GOVERNMENT

## 2024-06-13 ENCOUNTER — OFFICE VISIT (OUTPATIENT)
Dept: FAMILY MEDICINE CLINIC | Facility: CLINIC | Age: 59
End: 2024-06-13
Payer: OTHER GOVERNMENT

## 2024-06-13 ENCOUNTER — CLINICAL SUPPORT (OUTPATIENT)
Dept: FAMILY MEDICINE CLINIC | Facility: CLINIC | Age: 59
End: 2024-06-13
Payer: OTHER GOVERNMENT

## 2024-06-13 VITALS
WEIGHT: 142 LBS | HEART RATE: 83 BPM | HEIGHT: 66 IN | BODY MASS INDEX: 22.82 KG/M2 | OXYGEN SATURATION: 97 % | SYSTOLIC BLOOD PRESSURE: 128 MMHG | TEMPERATURE: 98.9 F | DIASTOLIC BLOOD PRESSURE: 78 MMHG

## 2024-06-13 DIAGNOSIS — E78.2 MIXED HYPERLIPIDEMIA: Primary | ICD-10-CM

## 2024-06-13 DIAGNOSIS — E55.9 VITAMIN D DEFICIENCY DISEASE: ICD-10-CM

## 2024-06-13 DIAGNOSIS — R50.9 FEVER, UNSPECIFIED FEVER CAUSE: Primary | ICD-10-CM

## 2024-06-13 DIAGNOSIS — M79.671 RIGHT FOOT PAIN: ICD-10-CM

## 2024-06-13 DIAGNOSIS — I73.9 PVD (PERIPHERAL VASCULAR DISEASE): ICD-10-CM

## 2024-06-13 DIAGNOSIS — I25.10 CORONARY ARTERY DISEASE INVOLVING NATIVE HEART WITHOUT ANGINA PECTORIS, UNSPECIFIED VESSEL OR LESION TYPE: ICD-10-CM

## 2024-06-13 DIAGNOSIS — M25.50 POLYARTHRALGIA: ICD-10-CM

## 2024-06-13 DIAGNOSIS — R26.81 UNSTABLE GAIT: ICD-10-CM

## 2024-06-13 LAB
BACTERIA UR QL AUTO: ABNORMAL /HPF
BASOPHILS # BLD AUTO: 0.05 10*3/MM3 (ref 0–0.2)
BASOPHILS NFR BLD AUTO: 0.8 % (ref 0–1.5)
BILIRUB UR QL STRIP: NEGATIVE
CLARITY UR: CLEAR
COLOR UR: YELLOW
CRP SERPL-MCNC: <0.3 MG/DL (ref 0–0.5)
DEPRECATED RDW RBC AUTO: 42.6 FL (ref 37–54)
EOSINOPHIL # BLD AUTO: 0.09 10*3/MM3 (ref 0–0.4)
EOSINOPHIL NFR BLD AUTO: 1.4 % (ref 0.3–6.2)
ERYTHROCYTE [DISTWIDTH] IN BLOOD BY AUTOMATED COUNT: 12.8 % (ref 12.3–15.4)
EXPIRATION DATE: NORMAL
FLUAV AG UPPER RESP QL IA.RAPID: NOT DETECTED
FLUBV AG UPPER RESP QL IA.RAPID: NOT DETECTED
GLUCOSE UR STRIP-MCNC: NEGATIVE MG/DL
HCT VFR BLD AUTO: 41 % (ref 34–46.6)
HGB BLD-MCNC: 14.1 G/DL (ref 12–15.9)
HGB UR QL STRIP.AUTO: ABNORMAL
HOLD SPECIMEN: NORMAL
HYALINE CASTS UR QL AUTO: ABNORMAL /LPF
IMM GRANULOCYTES # BLD AUTO: 0.01 10*3/MM3 (ref 0–0.05)
IMM GRANULOCYTES NFR BLD AUTO: 0.2 % (ref 0–0.5)
INTERNAL CONTROL: NORMAL
KETONES UR QL STRIP: NEGATIVE
LEUKOCYTE ESTERASE UR QL STRIP.AUTO: ABNORMAL
LYMPHOCYTES # BLD AUTO: 2.74 10*3/MM3 (ref 0.7–3.1)
LYMPHOCYTES NFR BLD AUTO: 41.4 % (ref 19.6–45.3)
Lab: NORMAL
MCH RBC QN AUTO: 31.6 PG (ref 26.6–33)
MCHC RBC AUTO-ENTMCNC: 34.4 G/DL (ref 31.5–35.7)
MCV RBC AUTO: 91.9 FL (ref 79–97)
MONOCYTES # BLD AUTO: 0.47 10*3/MM3 (ref 0.1–0.9)
MONOCYTES NFR BLD AUTO: 7.1 % (ref 5–12)
NEUTROPHILS NFR BLD AUTO: 3.26 10*3/MM3 (ref 1.7–7)
NEUTROPHILS NFR BLD AUTO: 49.1 % (ref 42.7–76)
NITRITE UR QL STRIP: NEGATIVE
NRBC BLD AUTO-RTO: 0 /100 WBC (ref 0–0.2)
PH UR STRIP.AUTO: 6.5 [PH] (ref 5–8)
PLATELET # BLD AUTO: 326 10*3/MM3 (ref 140–450)
PMV BLD AUTO: 10.4 FL (ref 6–12)
PROT UR QL STRIP: NEGATIVE
RBC # BLD AUTO: 4.46 10*6/MM3 (ref 3.77–5.28)
RBC # UR STRIP: ABNORMAL /HPF
REF LAB TEST METHOD: ABNORMAL
SARS-COV-2 AG UPPER RESP QL IA.RAPID: NOT DETECTED
SP GR UR STRIP: 1.01 (ref 1–1.03)
SQUAMOUS #/AREA URNS HPF: ABNORMAL /HPF
URATE SERPL-MCNC: 6.9 MG/DL (ref 2.4–5.7)
UROBILINOGEN UR QL STRIP: ABNORMAL
WBC # UR STRIP: ABNORMAL /HPF
WBC NRBC COR # BLD AUTO: 6.62 10*3/MM3 (ref 3.4–10.8)

## 2024-06-13 PROCEDURE — 81001 URINALYSIS AUTO W/SCOPE: CPT | Performed by: NURSE PRACTITIONER

## 2024-06-13 PROCEDURE — 86140 C-REACTIVE PROTEIN: CPT | Performed by: NURSE PRACTITIONER

## 2024-06-13 PROCEDURE — 85025 COMPLETE CBC W/AUTO DIFF WBC: CPT | Performed by: NURSE PRACTITIONER

## 2024-06-13 PROCEDURE — 84550 ASSAY OF BLOOD/URIC ACID: CPT | Performed by: NURSE PRACTITIONER

## 2024-06-13 PROCEDURE — 96372 THER/PROPH/DIAG INJ SC/IM: CPT | Performed by: NURSE PRACTITIONER

## 2024-06-13 PROCEDURE — 36415 COLL VENOUS BLD VENIPUNCTURE: CPT | Performed by: NURSE PRACTITIONER

## 2024-06-13 PROCEDURE — 87428 SARSCOV & INF VIR A&B AG IA: CPT | Performed by: NURSE PRACTITIONER

## 2024-06-13 PROCEDURE — 99214 OFFICE O/P EST MOD 30 MIN: CPT | Performed by: NURSE PRACTITIONER

## 2024-06-13 RX ORDER — LIDOCAINE 50 MG/G
1 PATCH TOPICAL EVERY 24 HOURS
Qty: 30 PATCH | Refills: 3 | Status: SHIPPED | OUTPATIENT
Start: 2024-06-13

## 2024-06-13 RX ORDER — FLUTICASONE PROPIONATE 50 MCG
2 SPRAY, SUSPENSION (ML) NASAL DAILY
Qty: 16 G | Refills: 1 | Status: SHIPPED | OUTPATIENT
Start: 2024-06-13

## 2024-06-13 RX ORDER — ERGOCALCIFEROL 1.25 MG/1
50000 CAPSULE ORAL
Qty: 13 CAPSULE | Refills: 1 | Status: SHIPPED | OUTPATIENT
Start: 2024-06-13

## 2024-06-13 RX ORDER — ALLOPURINOL 100 MG/1
100 TABLET ORAL 2 TIMES DAILY
Qty: 60 TABLET | Refills: 1 | Status: SHIPPED | OUTPATIENT
Start: 2024-06-13 | End: 2024-06-14 | Stop reason: SDUPTHER

## 2024-06-13 RX ORDER — INDOMETHACIN 25 MG/1
25-50 CAPSULE ORAL 2 TIMES DAILY WITH MEALS
Qty: 60 CAPSULE | Refills: 2 | Status: SHIPPED | OUTPATIENT
Start: 2024-06-13

## 2024-06-13 RX ORDER — DULOXETIN HYDROCHLORIDE 20 MG/1
20 CAPSULE, DELAYED RELEASE ORAL NIGHTLY
Qty: 90 CAPSULE | Refills: 1 | Status: SHIPPED | OUTPATIENT
Start: 2024-06-13

## 2024-06-13 RX ORDER — IPRATROPIUM BROMIDE AND ALBUTEROL SULFATE 2.5; .5 MG/3ML; MG/3ML
3 SOLUTION RESPIRATORY (INHALATION) EVERY 4 HOURS PRN
Qty: 360 ML | Refills: 1 | Status: SHIPPED | OUTPATIENT
Start: 2024-06-13

## 2024-06-13 RX ORDER — ALBUTEROL SULFATE 90 UG/1
2 AEROSOL, METERED RESPIRATORY (INHALATION) EVERY 4 HOURS PRN
Qty: 8 G | Refills: 2 | Status: SHIPPED | OUTPATIENT
Start: 2024-06-13

## 2024-06-13 RX ORDER — EVOLOCUMAB 420 MG/3.5
420 KIT SUBCUTANEOUS
Qty: 10.5 ML | Refills: 1 | Status: SHIPPED | OUTPATIENT
Start: 2024-06-13

## 2024-06-13 NOTE — PROGRESS NOTES
Chief Complaint  Hyperlipidemia and Gout    Subjective        Medical History: has a past medical history of Acid reflux, Arthritis, Depression, Disease of thyroid gland, Gout attack, Hypothyroidism, OAB (overactive bladder), Pelvic pain, PONV (postoperative nausea and vomiting), SOB (shortness of breath) on exertion, and Stress incontinence.     Surgical History: has a past surgical history that includes Appendectomy (1983); Lymph node biopsy (Left, 2001); Cholecystectomy (2012); Laparoscopic tubal ligation (Bilateral, 1994); Abdominoplasty (2016); Knee arthroscopy (Right); Dental surgery (2008); Laparoscopy (Bilateral, 10/17/2016); Colonoscopy (2013); Laparoscopy (Right, 5/7/2018); Esophagogastroduodenoscopy (2013); Blepharoplasty (Bilateral, 12/15/2022); Head/Neck Lesion/Cyst Excision (Left, 12/15/2022); Blepharoplasty (Bilateral, 12/15/2022); Cardiac catheterization (N/A, 3/6/2023); and transvaginal taping suspension (N/A, 6/3/2024).     Family History: family history includes Colon cancer in her maternal grandfather.     Social History: reports that she quit smoking about 2 months ago. Her smoking use included cigarettes. She started smoking about 39 years ago. She has a 58.9 pack-year smoking history. She has never used smokeless tobacco. She reports that she does not currently use alcohol. She reports that she does not use drugs.    Waleska Shah presents to [facility]  Flank Pain  This is a chronic problem. The current episode started more than 1 year ago. The problem is unchanged. The pain is present in the lumbar spine. The quality of the pain is described as aching. The pain is at a severity of 8/10. The pain is moderate-severe. The symptoms are aggravated by bending, sitting and standing. Pertinent negatives include no bladder incontinence, bowel incontinence, headaches, pelvic pain or perianal numbness. Risk factors include menopause. She has tried NSAIDs and  analgesics for the symptoms. The treatment provided moderate relief.   Back Pain  This is a chronic problem. The current episode started more than 1 year ago. The problem has been waxing and waning since onset. The pain is present in the thoracic spine. The pain is moderate. The pain is the same all the time. The symptoms are aggravated by bending and twisting. Stiffness is present in the morning. Pertinent negatives include no bladder incontinence, bowel incontinence or pelvic pain. Risk factors include poor posture (Macromastia). She has tried NSAIDs and heat for the symptoms. The treatment provided mild relief.  Patient was seen in March and was given lidocaine patches she states that they work, but she continues to have pain, she is currently seeing cardiology and pulmonology.  She was seen in urology I was reviewing patient's last CT abdomen and pelvis, when noted that it was shown patient had moderate to severe renal atherosclerosis bilaterally.  Renal functions have been normal.  Lab work.  It is concerning because patient also has severe atherosclerosis in her aorta and currently sees cardiology.  Patient is not overweight, she eats healthy, and exercises when she can, she does have physical mobility at this time due to the chronic right lower thoracic back pain.  Patient does not like to take medication, she states heat does help with the pain.  We will send over for a EMR TENS unit to help with with the pain.  Since pain has been ongoing and chronic, hoping patient will have positive results with the TENS unit and get some relief with the pain and stiffness.  Discussed with patient plan of care and what to expect, discussed if she needs help once the unit comes in to stop by the office and we can help her with that.  Patient verbalized understanding agreeable current treatment plan.  BMI is within normal parameters. No other follow-up for BMI required.     Patient had vaginal taping 6/3/2024 procedure was  "done vaginally, no new abdominal pain, nausea, vomiting, she follows back up with . 7/11/2024      Objective   Vital Signs:   /78   Pulse 83   Temp 98.9 °F (37.2 °C) (Oral)   Ht 167.6 cm (66\")   Wt 64.4 kg (142 lb)   SpO2 97%   BMI 22.92 kg/m²       Wt Readings from Last 3 Encounters:   06/13/24 64.4 kg (142 lb)   06/03/24 65.7 kg (144 lb 12.8 oz)   05/30/24 64 kg (141 lb)        BP Readings from Last 3 Encounters:   06/13/24 128/78   06/03/24 156/79   05/30/24 158/94            Physical Exam  Vitals reviewed.   Constitutional:       General: She is not in acute distress.     Appearance: Normal appearance. She is well-developed and normal weight. She is not ill-appearing.   HENT:      Head: Normocephalic and atraumatic.   Eyes:      Conjunctiva/sclera: Conjunctivae normal.      Pupils: Pupils are equal, round, and reactive to light.   Cardiovascular:      Rate and Rhythm: Normal rate and regular rhythm.      Heart sounds: No murmur heard.  Pulmonary:      Effort: Pulmonary effort is normal.      Breath sounds: Normal breath sounds. No wheezing.   Musculoskeletal:         General: Swelling (right foot) and tenderness present.      Right lower leg: No edema.      Left lower leg: No edema.   Skin:     General: Skin is warm and dry.   Neurological:      Mental Status: She is alert and oriented to person, place, and time.   Psychiatric:         Mood and Affect: Mood and affect normal.         Behavior: Behavior normal.         Thought Content: Thought content normal.         Judgment: Judgment normal.        Result Review :  {The following data was reviewed by LASHANDA Urban on 06/13/2024.  Common labs          3/13/2024    10:50 5/17/2024    09:35   Common Labs   Glucose 96  102    BUN 14  17    Creatinine 1.08  0.79    Sodium 141  141    Potassium 4.4  4.5    Chloride 103  105    Calcium 10.7  10.0    Albumin 4.9     Total Bilirubin 0.4     Alkaline Phosphatase 123     AST (SGOT) 12     ALT " (SGPT) 6     WBC 7.98  6.89    Hemoglobin 14.2  13.3    Hematocrit 42.2  39.4    Platelets 363  292    Total Cholesterol 288     Triglycerides 330     HDL Cholesterol 44     LDL Cholesterol  180       Lipid Panel          3/13/2024    10:50   Lipid Panel   Total Cholesterol 288    Triglycerides 330    HDL Cholesterol 44    VLDL Cholesterol 64    LDL Cholesterol  180    LDL/HDL Ratio 4.05      Renal Profile          3/13/2024    10:50 5/17/2024    09:35   Renal Profile   BUN 14  17    Creatinine 1.08  0.79      Data reviewed : Previous office note             Current Outpatient Medications on File Prior to Visit   Medication Sig Dispense Refill    Cyanocobalamin (B-12 PO) Take 1 tablet by mouth Daily.      promethazine-dextromethorphan (PROMETHAZINE-DM) 6.25-15 MG/5ML syrup Take 5 mL by mouth At Night As Needed for Cough (cough). 100 mL 0    traMADol (ULTRAM) 50 MG tablet Take 1 tablet by mouth Every 6 (Six) Hours As Needed for Moderate Pain. 30 tablet 0    [DISCONTINUED] albuterol sulfate  (90 Base) MCG/ACT inhaler Inhale 2 puffs Every 4 (Four) Hours As Needed for Wheezing or Shortness of Air. 8 g 2    [DISCONTINUED] allopurinol (Zyloprim) 100 MG tablet Take 1 tablet by mouth 2 (Two) Times a Day. 60 tablet 1    [DISCONTINUED] Diclofenac Sodium (VOLTAREN) 1 % gel gel Apply 4 g topically to the appropriate area as directed 4 (Four) Times a Day As Needed (pain). 100 g 1    [DISCONTINUED] DULoxetine (CYMBALTA) 20 MG capsule Take 1 capsule by mouth Every Night.      [DISCONTINUED] Evolocumab with Infusor (Repatha Pushtronex System) solution cartridge Inject 3.5 mL under the skin into the appropriate area as directed Every 30 (Thirty) Days. 10.5 mL 1    [DISCONTINUED] fluticasone (FLONASE) 50 MCG/ACT nasal spray 2 sprays into the nostril(s) as directed by provider Daily. 16 g 1    [DISCONTINUED] indomethacin (INDOCIN) 25 MG capsule Take 1-2 capsules by mouth 2 (Two) Times a Day With Meals. 60 capsule 2     [DISCONTINUED] ipratropium-albuterol (DUO-NEB) 0.5-2.5 mg/3 ml nebulizer Take 3 mL by nebulization Every 4 (Four) Hours As Needed for Wheezing or Shortness of Air. 360 mL 1    [DISCONTINUED] lidocaine (Lidoderm) 5 % Place 1 patch on the skin as directed by provider Daily. Remove & Discard patch within 12 hours or as directed by MD 30 patch 3    [DISCONTINUED] vitamin D (ERGOCALCIFEROL) 1.25 MG (58173 UT) capsule capsule Take 1 capsule by mouth Every 7 (Seven) Days. WEDNESDAYS 13 capsule 1     No current facility-administered medications on file prior to visit.        Assessment and Plan  Diagnoses and all orders for this visit:    1. Fever, unspecified fever cause (Primary)  -     POCT SARS-CoV-2 Antigen YUSRA + Flu  -     CBC & Differential  -     Urinalysis With Culture If Indicated - Urine, Clean Catch    2. Patient needing a refill for evaluation deviation radially compliancy today.  -     vitamin D (ERGOCALCIFEROL) 1.25 MG (20163 UT) capsule capsule; Take 1 capsule by mouth Every 7 (Seven) Days. WEDNESDAYS  Dispense: 13 capsule; Refill: 1    3. PVD (peripheral vascular disease)  -     Ambulatory Referral to Genetic Counseling/Testing    4. Coronary artery disease involving native heart without angina pectoris, unspecified vessel or lesion type  -     Ambulatory Referral to Genetic Counseling/Testing    5. Polyarthralgia  -     C-reactive Protein  -     Ambulatory Referral to Genetic Counseling/Testing    6. Right foot pain  -     Uric Acid  -     C-reactive Protein  -     Ambulatory Referral to Podiatry    7. Unstable gait  -     Miscellaneous DME    Other orders  -     Evolocumab with Infusor (Repatha Pushtronex System) solution cartridge; Inject 3.5 mL under the skin into the appropriate area as directed Every 30 (Thirty) Days.  Dispense: 10.5 mL; Refill: 1  -     albuterol sulfate  (90 Base) MCG/ACT inhaler; Inhale 2 puffs Every 4 (Four) Hours As Needed for Wheezing or Shortness of Air.  Dispense: 8 g;  Refill: 2  -     allopurinol (Zyloprim) 100 MG tablet; Take 1 tablet by mouth 2 (Two) Times a Day.  Dispense: 60 tablet; Refill: 1  -     Diclofenac Sodium (VOLTAREN) 1 % gel gel; Apply 4 g topically to the appropriate area as directed 4 (Four) Times a Day As Needed (pain).  Dispense: 100 g; Refill: 1  -     DULoxetine (CYMBALTA) 20 MG capsule; Take 1 capsule by mouth Every Night.  Dispense: 90 capsule; Refill: 1  -     fluticasone (FLONASE) 50 MCG/ACT nasal spray; 2 sprays into the nostril(s) as directed by provider Daily.  Dispense: 16 g; Refill: 1  -     indomethacin (INDOCIN) 25 MG capsule; Take 1-2 capsules by mouth 2 (Two) Times a Day With Meals.  Dispense: 60 capsule; Refill: 2  -     ipratropium-albuterol (DUO-NEB) 0.5-2.5 mg/3 ml nebulizer; Take 3 mL by nebulization Every 4 (Four) Hours As Needed for Wheezing or Shortness of Air.  Dispense: 360 mL; Refill: 1  -     lidocaine (Lidoderm) 5 %; Place 1 patch on the skin as directed by provider Daily. Remove & Discard patch within 12 hours or as directed by MD  Dispense: 30 patch; Refill: 3    Patient has had vague, polyarthralgia, extreme fatigue, with family history of lupus.  She states her sister and youngest son both have lupus.  Patient continues to have multiple joint pain, mainly on right side, she recently had gout in her right great toe, she complains of extreme fatigue, she has had rheumatology workup in the past without any significant finding.  I have seen patient for almost 3 years now, her symptoms have remained the same, she continues with the same complaints, not concerned something is going on that we have not determined.  Unguinal refer patient over to a  for further workup, she does have severe atherosclerosis, with severe atherosclerosis of her renal arteries and veins.  Will recheck labs today, patient is requiring a refill of all medications to be sent over to [place], [facility] no longer takes patient's insurance.  Discussed  return precautions.  Patient verbalized understanding.      Follow Up   Return for If symptoms do not improve new concerning symptoms.  Patient was given instructions and counseling regarding her condition or for health maintenance advice. Please see specific information pulled into the AVS if appropriate.       Part of this note may be electronic transcription/translation of spoken language to printed text using the Dragon dictation system

## 2024-06-14 RX ORDER — ALLOPURINOL 100 MG/1
300 TABLET ORAL DAILY
Qty: 30 TABLET | Refills: 1 | Status: SHIPPED | OUTPATIENT
Start: 2024-06-14

## 2024-06-14 RX ORDER — CIPROFLOXACIN 500 MG/1
500 TABLET, FILM COATED ORAL 2 TIMES DAILY
Qty: 10 TABLET | Refills: 0 | Status: SHIPPED | OUTPATIENT
Start: 2024-06-14

## 2024-06-14 RX ORDER — CIPROFLOXACIN 500 MG/1
500 TABLET, FILM COATED ORAL 2 TIMES DAILY
Qty: 10 TABLET | Refills: 0 | Status: SHIPPED | OUTPATIENT
Start: 2024-06-14 | End: 2024-06-14 | Stop reason: SDUPTHER

## 2024-06-21 ENCOUNTER — TELEPHONE (OUTPATIENT)
Dept: FAMILY MEDICINE CLINIC | Facility: CLINIC | Age: 59
End: 2024-06-21
Payer: OTHER GOVERNMENT

## 2024-06-21 DIAGNOSIS — I73.9 PVD (PERIPHERAL VASCULAR DISEASE): ICD-10-CM

## 2024-06-21 DIAGNOSIS — M79.671 RIGHT FOOT PAIN: Primary | ICD-10-CM

## 2024-06-21 DIAGNOSIS — M10.9 ACUTE GOUT OF RIGHT FOOT, UNSPECIFIED CAUSE: ICD-10-CM

## 2024-06-21 NOTE — TELEPHONE ENCOUNTER
Caller: Waleska Shah    Relationship: Self    Best call back number: [phone]    What orders are you requesting (i.e. lab or imaging): ORDERS FOR DME A CANE    In what timeframe would the patient need to come in: ASAP    Where will you receive your lab/imaging services: N/A    Additional notes: PATIENT NEEDS ORDERS FOR A CANE. SHE SAYS THAT [facility] DOES NOT CARRY THOSE. PATIENT ALSO WANTED TO KNOW IF HER PAPERWORK HAD BEEN FILLED OUT AND SIGNED. THIS IS FOR A HANDICAP PLACARD. PLEASE CALL PATIENT BACK AND ADVISE. PATIENT SAYS SHE NEEDS HER CANE ASAP.  ACCORDING TO THE PATIENT, SHE THOUGHT THE CANE ORDERS WERE ALREADY SENT.

## 2024-07-09 ENCOUNTER — TELEPHONE (OUTPATIENT)
Dept: FAMILY MEDICINE CLINIC | Facility: CLINIC | Age: 59
End: 2024-07-09

## 2024-07-09 NOTE — TELEPHONE ENCOUNTER
Caller: Waleska Shah    Relationship to patient: Self    Best call back number: [phone]     Patient is needing: PATIENT CALLED IN AND ASKS FOR A CALL FROM LASHANDA CASSIDY ABOUT HER MEDICATIONS.

## 2024-07-11 NOTE — TELEPHONE ENCOUNTER
Called and spoke to pt she states the Repatha is now going to cost her $600 a month and she can not do that . Can you change to something else ?

## 2024-07-17 ENCOUNTER — PRIOR AUTHORIZATION (OUTPATIENT)
Dept: FAMILY MEDICINE CLINIC | Facility: CLINIC | Age: 59
End: 2024-07-17
Payer: OTHER GOVERNMENT

## 2024-07-17 ENCOUNTER — TELEPHONE (OUTPATIENT)
Dept: PODIATRY | Facility: CLINIC | Age: 59
End: 2024-07-17

## 2024-07-17 NOTE — TELEPHONE ENCOUNTER
Spoke with pt regarding this issue and she had stated we can start with a PA because nothing has changed and last month it was way less money, a little over $40. I told pt I will call her this afternoon once I get the PA started and sent to plan as she does not have MyChart activated and will keep her updated so we can keep a POC.

## 2024-07-17 NOTE — TELEPHONE ENCOUNTER
Caller: Waleska Shah    Relationship to patient: Self    Best call back number: [phone]    Patient is needing: PATIENT WOULD LIKE THE NEW PATIENT PAPERWORK MAILED TO HER

## 2024-08-19 ENCOUNTER — OFFICE VISIT (OUTPATIENT)
Dept: FAMILY MEDICINE CLINIC | Facility: CLINIC | Age: 59
End: 2024-08-19
Payer: OTHER GOVERNMENT

## 2024-08-19 VITALS
SYSTOLIC BLOOD PRESSURE: 138 MMHG | BODY MASS INDEX: 25.67 KG/M2 | HEIGHT: 66 IN | WEIGHT: 159.7 LBS | DIASTOLIC BLOOD PRESSURE: 82 MMHG | OXYGEN SATURATION: 99 % | HEART RATE: 88 BPM | TEMPERATURE: 97.6 F

## 2024-08-19 DIAGNOSIS — R39.9 URINARY TRACT INFECTION SYMPTOMS: ICD-10-CM

## 2024-08-19 DIAGNOSIS — E66.3 OVERWEIGHT (BMI 25.0-29.9): ICD-10-CM

## 2024-08-19 DIAGNOSIS — Z12.11 COLON CANCER SCREENING: ICD-10-CM

## 2024-08-19 DIAGNOSIS — R03.0 ELEVATED BLOOD PRESSURE READING IN OFFICE WITH WHITE COAT SYNDROME, WITHOUT DIAGNOSIS OF HYPERTENSION: ICD-10-CM

## 2024-08-19 DIAGNOSIS — I25.10 ATHEROSCLEROTIC CARDIOVASCULAR DISEASE: ICD-10-CM

## 2024-08-19 DIAGNOSIS — I25.10 CORONARY ARTERY DISEASE, NON-OCCLUSIVE: ICD-10-CM

## 2024-08-19 DIAGNOSIS — Z09 FOLLOW-UP EXAM: Primary | ICD-10-CM

## 2024-08-19 LAB
BILIRUB BLD-MCNC: NEGATIVE MG/DL
CLARITY, POC: CLEAR
COLOR UR: YELLOW
EXPIRATION DATE: ABNORMAL
GLUCOSE UR STRIP-MCNC: NEGATIVE MG/DL
KETONES UR QL: NEGATIVE
LEUKOCYTE EST, POC: ABNORMAL
Lab: ABNORMAL
NITRITE UR-MCNC: NEGATIVE MG/ML
PH UR: 6.5 [PH] (ref 5–8)
PROT UR STRIP-MCNC: NEGATIVE MG/DL
RBC # UR STRIP: ABNORMAL /UL
SP GR UR: 1.02 (ref 1–1.03)
UROBILINOGEN UR QL: ABNORMAL

## 2024-08-19 PROCEDURE — 87086 URINE CULTURE/COLONY COUNT: CPT | Performed by: NURSE PRACTITIONER

## 2024-08-19 PROCEDURE — 99214 OFFICE O/P EST MOD 30 MIN: CPT | Performed by: NURSE PRACTITIONER

## 2024-08-19 PROCEDURE — 87088 URINE BACTERIA CULTURE: CPT | Performed by: NURSE PRACTITIONER

## 2024-08-19 PROCEDURE — 81003 URINALYSIS AUTO W/O SCOPE: CPT | Performed by: NURSE PRACTITIONER

## 2024-08-19 PROCEDURE — 87186 SC STD MICRODIL/AGAR DIL: CPT | Performed by: NURSE PRACTITIONER

## 2024-08-19 RX ORDER — ALLOPURINOL 300 MG/1
300 TABLET ORAL DAILY
Qty: 90 TABLET | Refills: 1 | Status: SHIPPED | OUTPATIENT
Start: 2024-08-19

## 2024-08-19 NOTE — PROGRESS NOTES
Chief Complaint  Pain (Rt side has had for years )    Subjective        Medical History: has a past medical history of Acid reflux, Arthritis, Depression, Disease of thyroid gland, Gout attack, Hypothyroidism, OAB (overactive bladder), Pelvic pain, PONV (postoperative nausea and vomiting), SOB (shortness of breath) on exertion, and Stress incontinence.     Surgical History: has a past surgical history that includes Appendectomy (1983); Lymph node biopsy (Left, 2001); Cholecystectomy (2012); Laparoscopic tubal ligation (Bilateral, 1994); Abdominoplasty (2016); Knee arthroscopy (Right); Dental surgery (2008); Laparoscopy (Bilateral, 10/17/2016); Colonoscopy (2013); Laparoscopy (Right, 5/7/2018); Esophagogastroduodenoscopy (2013); Blepharoplasty (Bilateral, 12/15/2022); Head/Neck Lesion/Cyst Excision (Left, 12/15/2022); Blepharoplasty (Bilateral, 12/15/2022); Cardiac catheterization (N/A, 3/6/2023); and transvaginal taping suspension (N/A, 6/3/2024).     Family History: family history includes Colon cancer in her maternal grandfather.     Social History: reports that she quit smoking about 4 months ago. Her smoking use included cigarettes. She started smoking about 39 years ago. She has a 58.9 pack-year smoking history. She has never used smokeless tobacco. She reports that she does not currently use alcohol. She reports that she does not use drugs.    Waleska Shah presents to [facility]  History of Present Illness  Patient comes in today wanting to discuss just overall wellbeing.  She continues to have a chronic right-sided pain, would like urinalysis checked in office today due to right lower and mid lower abdominal pain, intermittent, mild.    Patient is having a breast reduction, discussed that she will need medical clearance letter, patient does have elevated blood pressure in office, but normal at home per patient's logs.  Not currently on any medications for blood  "pressure.    Patient is currently being followed by cardiology, she is needing a new prescription for Repatha, per patient it was not covered, will resubmit prescription under [employer] in hopes that insurance will cover the Repatha for patient's coronary artery disease.    Patient is agreeable colon cancer screening will place order.    Patient needs allopurinol refilled.    Objective   Vital Signs:   /82   Pulse 88   Temp 97.6 °F (36.4 °C)   Ht 167.6 cm (66\")   Wt 72.4 kg (159 lb 11.2 oz)   SpO2 99%   BMI 25.78 kg/m²       Wt Readings from Last 3 Encounters:   08/19/24 72.4 kg (159 lb 11.2 oz)   06/13/24 64.4 kg (142 lb)   06/03/24 65.7 kg (144 lb 12.8 oz)        BP Readings from Last 3 Encounters:   08/19/24 138/82   06/13/24 128/78   06/03/24 156/79               Physical Exam  Vitals reviewed.   Constitutional:       General: She is not in acute distress.     Appearance: Normal appearance. She is well-developed. She is not ill-appearing.      Comments: Overweight BMI of 25   HENT:      Head: Normocephalic and atraumatic.   Eyes:      Conjunctiva/sclera: Conjunctivae normal.      Pupils: Pupils are equal, round, and reactive to light.   Cardiovascular:      Rate and Rhythm: Normal rate and regular rhythm.      Heart sounds: No murmur heard.  Pulmonary:      Effort: Pulmonary effort is normal.      Breath sounds: Normal breath sounds. No wheezing.   Skin:     General: Skin is warm and dry.   Neurological:      Mental Status: She is alert and oriented to person, place, and time.   Psychiatric:         Mood and Affect: Mood and affect normal.         Behavior: Behavior normal.         Thought Content: Thought content normal.         Judgment: Judgment normal.        Result Review :  {The following data was reviewed by LASHANDA Urban on 08/19/2024.  Common labs          3/13/2024    10:50 5/17/2024    09:35 6/13/2024    09:22   Common Labs   Glucose 96  102     BUN 14  17     Creatinine 1.08  " 0.79     Sodium 141  141     Potassium 4.4  4.5     Chloride 103  105     Calcium 10.7  10.0     Albumin 4.9      Total Bilirubin 0.4      Alkaline Phosphatase 123      AST (SGOT) 12      ALT (SGPT) 6      WBC 7.98  6.89  6.62    Hemoglobin 14.2  13.3  14.1    Hematocrit 42.2  39.4  41.0    Platelets 363  292  326    Total Cholesterol 288      Triglycerides 330      HDL Cholesterol 44      LDL Cholesterol  180      Uric Acid   6.9      Data reviewed : Most recent cardiology note             Current Outpatient Medications on File Prior to Visit   Medication Sig Dispense Refill    traMADol (ULTRAM) 50 MG tablet Take 1 tablet by mouth Every 6 (Six) Hours As Needed for Moderate Pain. 30 tablet 0    albuterol sulfate  (90 Base) MCG/ACT inhaler Inhale 2 puffs Every 4 (Four) Hours As Needed for Wheezing or Shortness of Air. (Patient not taking: Reported on 8/19/2024) 8 g 2    ciprofloxacin (Cipro) 500 MG tablet Take 1 tablet by mouth 2 (Two) Times a Day. 10 tablet 0    Cyanocobalamin (B-12 PO) Take 1 tablet by mouth Daily. (Patient not taking: Reported on 8/19/2024)      Diclofenac Sodium (VOLTAREN) 1 % gel gel Apply 4 g topically to the appropriate area as directed 4 (Four) Times a Day As Needed (pain). (Patient not taking: Reported on 8/19/2024) 100 g 1    DULoxetine (CYMBALTA) 20 MG capsule Take 1 capsule by mouth Every Night. (Patient not taking: Reported on 8/19/2024) 90 capsule 1    fluticasone (FLONASE) 50 MCG/ACT nasal spray 2 sprays into the nostril(s) as directed by provider Daily. (Patient not taking: Reported on 8/19/2024) 16 g 1    indomethacin (INDOCIN) 25 MG capsule Take 1-2 capsules by mouth 2 (Two) Times a Day With Meals. (Patient not taking: Reported on 8/19/2024) 60 capsule 2    ipratropium-albuterol (DUO-NEB) 0.5-2.5 mg/3 ml nebulizer Take 3 mL by nebulization Every 4 (Four) Hours As Needed for Wheezing or Shortness of Air. (Patient not taking: Reported on 8/19/2024) 360 mL 1    lidocaine  (Lidoderm) 5 % Place 1 patch on the skin as directed by provider Daily. Remove & Discard patch within 12 hours or as directed by MD (Patient not taking: Reported on 8/19/2024) 30 patch 3    promethazine-dextromethorphan (PROMETHAZINE-DM) 6.25-15 MG/5ML syrup Take 5 mL by mouth At Night As Needed for Cough (cough). (Patient not taking: Reported on 8/19/2024) 100 mL 0    vitamin D (ERGOCALCIFEROL) 1.25 MG (78967 UT) capsule capsule Take 1 capsule by mouth Every 7 (Seven) Days. WEDNESDAYS (Patient not taking: Reported on 8/19/2024) 13 capsule 1     No current facility-administered medications on file prior to visit.      Brief Urine Lab Results  (Last result in the past 365 days)        Color   Clarity   Blood   Leuk Est   Nitrite   Protein   CREAT   Urine HCG        08/19/24 1634 Yellow   Clear   Small   Small (1+)   Negative   Negative                  Assessment and Plan  Diagnoses and all orders for this visit:    1. Follow-up exam (Primary)    2. Overweight (BMI 25.0-29.9)    3. Colon cancer screening  -     Ambulatory Referral For Screening Colonoscopy    4. Elevated blood pressure reading in office with white coat syndrome, without diagnosis of hypertension    5. Atherosclerotic cardiovascular disease    6. Coronary artery disease, non-occlusive    7. Urinary tract infection symptoms  -     POCT urinalysis dipstick, automated  -     Urine Culture - Urine, Urine, Clean Catch; Future  -     Urine Culture - Urine, Urine, Clean Catch    Other orders  -     allopurinol (ZYLOPRIM) 300 MG tablet; Take 1 tablet by mouth Daily.  Dispense: 90 tablet; Refill: 1  -     Evolocumab (REPATHA) solution auto-injector [employer] injection; Inject 1 mL under the skin into the appropriate area as directed Every 14 (Fourteen) Days.  Dispense: 1 mL; Refill: 3    Urine showed small leukocytes, will send urine off for culture.  Will resend Repatha for her patient's coronary artery disease.  Discussed return precautions, patient  verbalized understanding agreeable treatment plan.    Follow Up   Return for If symptoms do not improve new concerning symptoms.  Patient was given instructions and counseling regarding her condition or for health maintenance advice. Please see specific information pulled into the AVS if appropriate.       Part of this note may be electronic transcription/translation of spoken language to printed text using the Dragon dictation system

## 2024-08-21 ENCOUNTER — PRIOR AUTHORIZATION (OUTPATIENT)
Dept: FAMILY MEDICINE CLINIC | Facility: CLINIC | Age: 59
End: 2024-08-21
Payer: OTHER GOVERNMENT

## 2024-08-21 ENCOUNTER — OFFICE VISIT (OUTPATIENT)
Dept: PODIATRY | Facility: CLINIC | Age: 59
End: 2024-08-21
Payer: OTHER GOVERNMENT

## 2024-08-21 VITALS
BODY MASS INDEX: 25.66 KG/M2 | DIASTOLIC BLOOD PRESSURE: 86 MMHG | WEIGHT: 159 LBS | TEMPERATURE: 98.2 F | HEART RATE: 95 BPM | OXYGEN SATURATION: 94 % | SYSTOLIC BLOOD PRESSURE: 147 MMHG

## 2024-08-21 DIAGNOSIS — M25.571 CHRONIC PAIN OF RIGHT ANKLE: Primary | ICD-10-CM

## 2024-08-21 DIAGNOSIS — M10.9 GOUT OF RIGHT ANKLE, UNSPECIFIED CAUSE, UNSPECIFIED CHRONICITY: ICD-10-CM

## 2024-08-21 DIAGNOSIS — G89.29 CHRONIC PAIN OF RIGHT ANKLE: Primary | ICD-10-CM

## 2024-08-21 LAB — BACTERIA SPEC AEROBE CULT: ABNORMAL

## 2024-08-21 RX ORDER — SULFAMETHOXAZOLE AND TRIMETHOPRIM 800; 160 MG/1; MG/1
1 TABLET ORAL 2 TIMES DAILY
Qty: 14 TABLET | Refills: 0 | Status: SHIPPED | OUTPATIENT
Start: 2024-08-21

## 2024-08-21 NOTE — LETTER
August 21, 2024       No Recipients    Patient: Waleska Shah   YOB: 1965   Date of Visit: 8/21/2024       Dear LASHANDA Urban:    Thank you for referring Waleska Shah to me for evaluation. Below are the relevant portions of my assessment and plan of care.    Encounter Diagnosis and Orders:  Diagnoses and all orders for this visit:    1. Chronic pain of right ankle (Primary)    2. Gout of right ankle, unspecified cause, unspecified chronicity  -     Diclofenac Sodium (VOLTAREN) 1 % gel gel; Apply 4 g topically to the appropriate area as directed 4 (Four) Times a Day.  Dispense: 100 g; Refill: 5        If you have questions, please do not hesitate to call me. I look forward to following Waleska along with you.         Sincerely,        Xavier Peterson DPM        CC:   No Recipients

## 2024-08-21 NOTE — PROGRESS NOTES
[facility] - PODIATRY    Today's Date: 08/21/24    Patient Name: Waleska Shah  MRN: [id number]  CSN: [id number]  PCP: Erick Sandhu APRN  Referring Provider: Erick Sandhu, *    SUBJECTIVE     Chief Complaint   Patient presents with    Right Foot - Pain, Establish Care     Referral from LASHANDA Warren for right foot pain. Pain is in heel area. Xrays done 5/28/24. She has gout     HPI: Waleska Shah, a 58 y.o.female, presents to clinic.    New, Established, New Problem: New    Location: Posterior medial right ankle    Duration: Started when she had a gout attack in May 2024    Onset: Insidious    Nature: Sore, painful.  Patient showed pictures of this area been extremely swollen in May 2024 she was diagnosed with gout    Aggravating factors:  Patient relates pain is aggravated by shoe gear and ambulation.      Previous Treatment: Gout medication.    Patient denies any fevers, chills, nausea, vomiting, shortness of breath, nor any other constitutional signs nor symptoms.    No other pedal complaints at this time.    Patient states she has GI intolerance to ibuprofen.    Past Medical History:   Diagnosis Date    Acid reflux     no meds    Arthritis     rt knee    Depression     Disease of thyroid gland     low    Gout attack     RT TOE    Hypothyroidism     OAB (overactive bladder)     Pelvic pain     right side    PONV (postoperative nausea and vomiting)     STATES WOULD LIKE SCOP PATCH AM OF SURGERY    SOB (shortness of breath) on exertion     Stress incontinence     DAILY PAD     Past Surgical History:   Procedure Laterality Date    ABDOMINOPLASTY  2016    APPENDECTOMY  1983    BLEPHAROPLASTY Bilateral 12/15/2022    Procedure: BILATERAL UPPER EYELID BLEPHAROPLASTY;  Surgeon: Royer Coyle MD;  Location: [facility];  Service: Ophthalmology;  Laterality: Bilateral;    BLEPHAROPLASTY Bilateral 12/15/2022    Procedure: BILATERAL LOWER EYELID BLEPHAROPLASTY;   Surgeon: Royer Coyle MD;  Location: [facility] OR;  Service: Ophthalmology;  Laterality: Bilateral;  NEW IMAGE    CARDIAC CATHETERIZATION N/A 3/6/2023    Procedure: Left Heart Cath;  Surgeon: Bj Che MD;  Location: [facility] CATH INVASIVE LOCATION;  Service: Cardiovascular;  Laterality: N/A;    CHOLECYSTECTOMY      COLONOSCOPY  2013    DENTAL PROCEDURE  2008    DIAGNOSTIC LAPAROSCOPY Bilateral 10/17/2016    Procedure: LAPAROSCOPY W/ JAMAL SALPINGECTOMY LT SALPINGO-OOPHORECTOMY;  Surgeon: Bisi Donnelly MD;  Location: [facility] OR;  Service:     DIAGNOSTIC LAPAROSCOPY Right 2018    Procedure: LAPAROSCOPIC RIGHT OVARIAN CYSTECTOMY LYSIS OF ADHESIONS LEFT ABDOMINAL WALL;  Surgeon: Bisi Donnelly MD;  Location: [facility] OR;  Service: Gynecology    ENDOSCOPY  2013    HEAD/NECK LESION/CYST EXCISION Left 12/15/2022    Procedure: LEFT EXCISION AND REPAIR OF BROW CYST;  Surgeon: Royer Coyle MD;  Location: [facility] OR;  Service: Ophthalmology;  Laterality: Left;    KNEE ARTHROSCOPY Right     X 3    LAPAROSCOPIC TUBAL LIGATION Bilateral     LYMPH NODE BIOPSY Left     GROIN    TRANSVAGINAL TAPING SUSPENSION N/A 6/3/2024    Procedure: TENSION FREE VAGINAL TAPING;  Surgeon: Julisas Robin MD;  Location: [facility];  Service: Obstetrics/Gynecology;  Laterality: N/A;     Family History   Problem Relation Age of Onset    Colon cancer Maternal Grandfather         50'S    Malig Hyperthermia Neg Hx      Social History     Socioeconomic History    Marital status:    Tobacco Use    Smoking status: Former     Current packs/day: 0.00     Average packs/day: 1.5 packs/day for 39.3 years (58.9 ttl pk-yrs)     Types: Cigarettes     Start date:      Quit date: 2024     Years since quittin.3    Smokeless tobacco: Never    Tobacco comments:     WAS SMOKING 2PPD   Vaping Use    Vaping status: Never Used   Substance and Sexual Activity    Alcohol use: Not  "Currently    Drug use: Never    Sexual activity: Yes     Partners: Male     Birth control/protection: Post-menopausal     Allergies   Allergen Reactions    Amoxicillin Itching    Hydrocodone GI Intolerance     PT STATES SHE IS BOTHERED ONLY BY \"GENERIC\" HYDROCODONE.  ALSO GETS A HEADACHE AND BLURRED VISION WITH \"GENERIC\" HYDROCODONE    Ibuprofen Nausea And Vomiting    Percocet [Oxycodone-Acetaminophen] Itching and Rash    Tylenol With Codeine #3 [Acetaminophen-Codeine] Nausea And Vomiting and Other (See Comments)     Vision blurry     Current Outpatient Medications   Medication Sig Dispense Refill    allopurinol (ZYLOPRIM) 300 MG tablet Take 1 tablet by mouth Daily. 90 tablet 1    ciprofloxacin (Cipro) 500 MG tablet Take 1 tablet by mouth 2 (Two) Times a Day. 10 tablet 0    Diclofenac Sodium (VOLTAREN) 1 % gel gel Apply 4 g topically to the appropriate area as directed 4 (Four) Times a Day As Needed (pain). 100 g 1    DULoxetine (CYMBALTA) 20 MG capsule Take 1 capsule by mouth Every Night. 90 capsule 1    Evolocumab (REPATHA) solution auto-injector [employer] injection Inject 1 mL under the skin into the appropriate area as directed Every 14 (Fourteen) Days. 1 mL 3    fluticasone (FLONASE) 50 MCG/ACT nasal spray 2 sprays into the nostril(s) as directed by provider Daily. 16 g 1    indomethacin (INDOCIN) 25 MG capsule Take 1-2 capsules by mouth 2 (Two) Times a Day With Meals. 60 capsule 2    lidocaine (Lidoderm) 5 % Place 1 patch on the skin as directed by provider Daily. Remove & Discard patch within 12 hours or as directed by MD 30 patch 3    promethazine-dextromethorphan (PROMETHAZINE-DM) 6.25-15 MG/5ML syrup Take 5 mL by mouth At Night As Needed for Cough (cough). 100 mL 0    traMADol (ULTRAM) 50 MG tablet Take 1 tablet by mouth Every 6 (Six) Hours As Needed for Moderate Pain. 30 tablet 0    vitamin D (ERGOCALCIFEROL) 1.25 MG (91911 UT) capsule capsule Take 1 capsule by mouth Every 7 (Seven) Days. WEDNESDAYS 13 " capsule 1    albuterol sulfate  (90 Base) MCG/ACT inhaler Inhale 2 puffs Every 4 (Four) Hours As Needed for Wheezing or Shortness of Air. (Patient not taking: Reported on 8/19/2024) 8 g 2    Cyanocobalamin (B-12 PO) Take 1 tablet by mouth Daily. (Patient not taking: Reported on 8/19/2024)      Diclofenac Sodium (VOLTAREN) 1 % gel gel Apply 4 g topically to the appropriate area as directed 4 (Four) Times a Day. 100 g 5    ipratropium-albuterol (DUO-NEB) 0.5-2.5 mg/3 ml nebulizer Take 3 mL by nebulization Every 4 (Four) Hours As Needed for Wheezing or Shortness of Air. (Patient not taking: Reported on 8/19/2024) 360 mL 1     No current facility-administered medications for this visit.     Review of Systems   Constitutional: Negative.    Musculoskeletal:         Right medial ankle   All other systems reviewed and are negative.      OBJECTIVE     Vitals:    08/21/24 1251   BP: 147/86   Pulse: 95   Temp: 98.2 °F (36.8 °C)   SpO2: 94%       WBC   Date Value Ref Range Status   06/13/2024 6.62 3.40 - 10.80 10*3/mm3 Final     RBC   Date Value Ref Range Status   06/13/2024 4.46 3.77 - 5.28 10*6/mm3 Final     Hemoglobin   Date Value Ref Range Status   06/13/2024 14.1 12.0 - 15.9 g/dL Final     Hematocrit   Date Value Ref Range Status   06/13/2024 41.0 34.0 - 46.6 % Final     MCV   Date Value Ref Range Status   06/13/2024 91.9 79.0 - 97.0 fL Final     MCH   Date Value Ref Range Status   06/13/2024 31.6 26.6 - 33.0 pg Final     MCHC   Date Value Ref Range Status   06/13/2024 34.4 31.5 - 35.7 g/dL Final     RDW   Date Value Ref Range Status   06/13/2024 12.8 12.3 - 15.4 % Final     RDW-SD   Date Value Ref Range Status   06/13/2024 42.6 37.0 - 54.0 fl Final     MPV   Date Value Ref Range Status   06/13/2024 10.4 6.0 - 12.0 fL Final     Platelets   Date Value Ref Range Status   06/13/2024 326 140 - 450 10*3/mm3 Final     Neutrophil %   Date Value Ref Range Status   06/13/2024 49.1 42.7 - 76.0 % Final     Lymphocyte %   Date  Value Ref Range Status   06/13/2024 41.4 19.6 - 45.3 % Final     Monocyte %   Date Value Ref Range Status   06/13/2024 7.1 5.0 - 12.0 % Final     Eosinophil %   Date Value Ref Range Status   06/13/2024 1.4 0.3 - 6.2 % Final     Basophil %   Date Value Ref Range Status   06/13/2024 0.8 0.0 - 1.5 % Final     Immature Grans %   Date Value Ref Range Status   06/13/2024 0.2 0.0 - 0.5 % Final     Neutrophils, Absolute   Date Value Ref Range Status   06/13/2024 3.26 1.70 - 7.00 10*3/mm3 Final     Lymphocytes, Absolute   Date Value Ref Range Status   06/13/2024 2.74 0.70 - 3.10 10*3/mm3 Final     Monocytes, Absolute   Date Value Ref Range Status   06/13/2024 0.47 0.10 - 0.90 10*3/mm3 Final     Eosinophils, Absolute   Date Value Ref Range Status   06/13/2024 0.09 0.00 - 0.40 10*3/mm3 Final     Basophils, Absolute   Date Value Ref Range Status   06/13/2024 0.05 0.00 - 0.20 10*3/mm3 Final     Immature Grans, Absolute   Date Value Ref Range Status   06/13/2024 0.01 0.00 - 0.05 10*3/mm3 Final     nRBC   Date Value Ref Range Status   06/13/2024 0.0 0.0 - 0.2 /100 WBC Final         Lab Results   Component Value Date    GLUCOSE 102 (H) 05/17/2024    BUN 17 05/17/2024    CREATININE 0.79 05/17/2024     05/17/2024    K 4.5 05/17/2024     05/17/2024    CALCIUM 10.0 05/17/2024    PROTEINTOT 7.8 03/13/2024    ALBUMIN 4.9 03/13/2024    ALT 6 03/13/2024    AST 12 03/13/2024    ALKPHOS 123 (H) 03/13/2024    BILITOT 0.4 03/13/2024    GLOB 2.9 03/13/2024    AGRATIO 1.7 03/13/2024    BCR 21.5 05/17/2024    ANIONGAP 12.8 05/17/2024    EGFR 87.4 05/17/2024       Patient seen in no apparent distress.      PHYSICAL EXAM:     Foot/Ankle Exam    GENERAL  Appearance:  appears stated age  Orientation:  AAOx3  Affect:  appropriate  Gait:  unimpaired  Assistance:  independent  Right shoe gear: casual shoe  Left shoe gear: casual shoe    VASCULAR     Right Foot Vascularity   Normal vascular exam    Dorsalis pedis:  2+  Posterior tibial:   2+  Skin temperature:  warm  Edema grading:  None  CFT:  < 3 seconds  Pedal hair growth:  Present  Varicosities:  none     Left Foot Vascularity   Normal vascular exam    Dorsalis pedis:  2+  Posterior tibial:  2+  Skin temperature:  warm  Edema grading:  None  CFT:  < 3 seconds  Pedal hair growth:  Present  Varicosities:  none     NEUROLOGIC     Right Foot Neurologic   Normal sensation    Light touch sensation: normal  Vibratory sensation: normal  Hot/Cold sensation: normal  Protective Sensation using Clarence-Nora Monofilament:   Sites intact: 10  Sites tested: 10     Left Foot Neurologic   Normal sensation    Light touch sensation: normal  Vibratory sensation: normal  Hot/Cold sensation:  normal  Protective Sensation using Clarence-Nora Monofilament:   Sites intact: 10  Sites tested: 10    MUSCULOSKELETAL     Right Foot Musculoskeletal   Ecchymosis:  none  Tenderness:  (Right medial ankle)    MUSCLE STRENGTH     Right Foot Muscle Strength   Foot dorsiflexion:  4  Foot plantar flexion:  4  Foot inversion:  4  Foot eversion:  4     Left Foot Muscle Strength   Foot dorsiflexion:  4  Foot plantar flexion:  4  Foot inversion:  4  Foot eversion:  4    RANGE OF MOTION     Right Foot Range of Motion   Foot and ankle ROM within normal limits       Left Foot Range of Motion   Foot and ankle ROM within normal limits      DERMATOLOGIC      Right Foot Dermatologic   Skin  Right foot skin is intact.      Left Foot Dermatologic   Skin  Left foot skin is intact.       RADIOLOGY:      XR FOOT 3+ VW RIGHT-     Date of Exam: 5/28/2024 10:32 AM     Indication: Right foot pain     Comparison: None available.     Findings:  There is no evidence of fracture. Bipartite medial hallux sesamoid.  Normal joint alignment. No significant degenerative changes. Tiny dorsal  calcaneal enthesophyte. Soft tissues are unremarkable.     IMPRESSION:  Impression:  No acute abnormality of the right foot.      Electronically Signed By-Corby Perry  MD On:5/28/2024 10:39 AM     ASSESSMENT/PLAN     Diagnoses and all orders for this visit:    1. Chronic pain of right ankle (Primary)    2. Gout of right ankle, unspecified cause, unspecified chronicity  -     Diclofenac Sodium (VOLTAREN) 1 % gel gel; Apply 4 g topically to the appropriate area as directed 4 (Four) Times a Day.  Dispense: 100 g; Refill: 5    Comprehensive lower extremity examination and evaluation was performed.    Discussed findings and treatment plan including risks, benefits, and treatment options with patient in detail. Patient agreed with treatment plan.    Medications and allergies reviewed.  Reviewed available lab values along with other pertinent labs.  These were discussed with the patient.    Rice Therapy: It is important to treat any injury as soon as possible to help control swelling and increase recovery time. The recognized regimen for immediate treatment of sport injuries includes rest, ice (cold application), compression, and elevation (RICE). Remove the injured athlete from play, apply ice to the affected area, wrap or compress the injured area with an elastic bandage when appropriate, and elevate the injured area above heart level to reduce swelling.  The patient is to not use ice for longer than 20 minutes at a time, with at least 20 minutes of no ice usage between applications.     Patient instructed use OTC analgesics with dosing per package insert as needed.      The patient states understanding and agreement with this plan.    An After Visit Summary was printed and given to the patient at discharge, including (if requested) any available informative/educational handouts regarding diagnosis, treatment, or medications. All questions were answered to patient/family satisfaction. Should symptoms fail to improve or worsen they agree to call or return to clinic or to go to the Emergency Department. Discussed the importance of following up with any needed screening  tests/labs/specialist appointments and any requested follow-up recommended by me today. Importance of maintaining follow-up discussed and patient accepts that missed appointments can delay diagnosis and potentially lead to worsening of conditions.    Return if symptoms worsen or fail to improve., or sooner if acute issues arise.    This document has been electronically signed by Xavier Peterson DPM on August 21, 2024 13:17 EDT

## 2024-08-21 NOTE — TELEPHONE ENCOUNTER
PA Rx Created for Waleska Shah (Key: [id number]) - [id number]  Repatha [employer] 140MG/ML auto-injectors  status: PA RequestCreated: August 19th, 2024 [phone]: August 21st, 2024

## 2024-08-22 ENCOUNTER — PRIOR AUTHORIZATION (OUTPATIENT)
Dept: FAMILY MEDICINE CLINIC | Facility: CLINIC | Age: 59
End: 2024-08-22
Payer: OTHER GOVERNMENT

## 2024-08-22 NOTE — TELEPHONE ENCOUNTER
Bedside report received from off going RN/tech: Tyler, assumed care of patient.   Seen by Alert Team.     Fall risk interventions in place: Move the patient closer to the nurse's station, Keep floor surfaces clean and dry, and Human-sitter if tele-sitter fails (all applicable per [place] Fall risk assessment)   Continuous monitoring: Not Applicable   IVF/IV medications: Not Applicable   Oxygen: Room Air  Bedside sitter: Pt on L2k SI with 1:1 sitter designee (name), Report given to sitter, checklist completed, and checklist completed, stop sign in doorway  Isolation: Not Applicable         PA Rx DENIED for Repatha SureClick 140MG/ML auto-injectors - Waleska Shah (Key: [id number])  PA Case ID #: [id number]  Rx #: [id number]  Need Help? Call us at [phone]  Outcome  Denied on August 21 by [employer] 2017  CaseId:[id number];Status:Denied;Review Type:Prior Auth;Appeal Information: Attention:ATTN: CLINICAL APPEALS DEPARTMENT [employer] [address] [address],[place], [place],[address] Phone:[phone] Fax:[phone]; Important - Please read the below note on eAppeals: Please reference the denial letter for information on the rights for an appeal, rationale for the denial, and how to submit an appeal including if any information is needed to support the appeal. Note about urgent situations - Generally, an urgent situation is one which, in the opinion of the provider, the health of the patient may be in serious jeopardy or may experience pain that cannot be adequately controlled while waiting for a decision on the appeal.;  Drug  Repatha SureClick 140MG/ML auto-injectors  ePA cloud logo  Form  Dede Electronic PA Form (2017 [employer])  Original Claim Info  75

## 2024-08-26 ENCOUNTER — TELEPHONE (OUTPATIENT)
Dept: FAMILY MEDICINE CLINIC | Facility: CLINIC | Age: 59
End: 2024-08-26

## 2024-08-27 ENCOUNTER — TELEPHONE (OUTPATIENT)
Dept: FAMILY MEDICINE CLINIC | Facility: CLINIC | Age: 59
End: 2024-08-27

## 2024-08-29 ENCOUNTER — PRE-ADMISSION TESTING (OUTPATIENT)
Dept: PREADMISSION TESTING | Facility: HOSPITAL | Age: 59
End: 2024-08-29
Payer: OTHER GOVERNMENT

## 2024-08-29 VITALS
OXYGEN SATURATION: 98 % | HEART RATE: 69 BPM | WEIGHT: 158 LBS | SYSTOLIC BLOOD PRESSURE: 153 MMHG | BODY MASS INDEX: 23.95 KG/M2 | DIASTOLIC BLOOD PRESSURE: 83 MMHG | TEMPERATURE: 97.8 F | RESPIRATION RATE: 18 BRPM | HEIGHT: 68 IN

## 2024-08-29 LAB
ANION GAP SERPL CALCULATED.3IONS-SCNC: 10.7 MMOL/L (ref 5–15)
BUN SERPL-MCNC: 16 MG/DL (ref 6–20)
BUN/CREAT SERPL: 15 (ref 7–25)
CALCIUM SPEC-SCNC: 10.1 MG/DL (ref 8.6–10.5)
CHLORIDE SERPL-SCNC: 106 MMOL/L (ref 98–107)
CO2 SERPL-SCNC: 24.3 MMOL/L (ref 22–29)
CREAT SERPL-MCNC: 1.07 MG/DL (ref 0.57–1)
DEPRECATED RDW RBC AUTO: 42.4 FL (ref 37–54)
EGFRCR SERPLBLD CKD-EPI 2021: 60.7 ML/MIN/1.73
ERYTHROCYTE [DISTWIDTH] IN BLOOD BY AUTOMATED COUNT: 12.2 % (ref 12.3–15.4)
GLUCOSE SERPL-MCNC: 98 MG/DL (ref 65–99)
HCT VFR BLD AUTO: 38.9 % (ref 34–46.6)
HGB BLD-MCNC: 12.9 G/DL (ref 12–15.9)
MCH RBC QN AUTO: 31.2 PG (ref 26.6–33)
MCHC RBC AUTO-ENTMCNC: 33.2 G/DL (ref 31.5–35.7)
MCV RBC AUTO: 94.2 FL (ref 79–97)
PLATELET # BLD AUTO: 317 10*3/MM3 (ref 140–450)
PMV BLD AUTO: 9.8 FL (ref 6–12)
POTASSIUM SERPL-SCNC: 5 MMOL/L (ref 3.5–5.2)
RBC # BLD AUTO: 4.13 10*6/MM3 (ref 3.77–5.28)
SODIUM SERPL-SCNC: 141 MMOL/L (ref 136–145)
WBC NRBC COR # BLD AUTO: 6.17 10*3/MM3 (ref 3.4–10.8)

## 2024-08-29 PROCEDURE — 80048 BASIC METABOLIC PNL TOTAL CA: CPT

## 2024-08-29 PROCEDURE — 36415 COLL VENOUS BLD VENIPUNCTURE: CPT

## 2024-08-29 PROCEDURE — 85027 COMPLETE CBC AUTOMATED: CPT

## 2024-08-29 RX ORDER — CLONAZEPAM 0.5 MG/1
0.25-0.5 TABLET ORAL 2 TIMES DAILY PRN
COMMUNITY
Start: 2024-08-22

## 2024-08-29 RX ORDER — ESTRADIOL 0.1 MG/G
CREAM VAGINAL WEEKLY
COMMUNITY
Start: 2024-06-24

## 2024-08-29 NOTE — DISCHARGE INSTRUCTIONS
Take the following medications the morning of surgery:      NO MEDS AM OF SURGERY    If you are on prescription narcotic pain medication to control your pain you may also take that medication the morning of surgery.      General Instructions:     Do not eat solid food after midnight the night before surgery.  Clear liquids day of surgery are allowed but must be stopped at least two hours before your hospital arrival time.       Allowed clear liquids      Water, sodas, and tea or coffee with no cream or milk added.       12 to 20 ounces of a clear liquid that contains carbohydrates is recommended.  If non-diabetic, have Gatorade or Powerade.  If diabetic, have G2 or Powerade Zero.     Do not have liquids red in color.  Do not consume chicken, beef, pork or vegetable broth or bouillon cubes of any variety as they are not considered clear liquids and are not allowed.      Infants may have breast milk up to four hours before surgery.  Infants drinking formula may drink formula up to six hours before surgery.   Patients who avoid smoking, chewing tobacco and alcohol for 4 weeks prior to surgery have a reduced risk of post-operative complications.  Quit smoking as many days before surgery as you can.  Do not smoke, use chewing tobacco or drink alcohol the day of surgery.   If applicable bring your C-PAP/ BI-PAP machine in with you to preop day of surgery.  Bring any papers given to you in the doctor’s office.  Wear clean comfortable clothes.  Do not wear contact lenses, false eyelashes or make-up.  Bring a case for your glasses.   Bring crutches or walker if applicable.  Remove all piercings.  Leave jewelry and any other valuables at home.  Hair extensions with metal clips must be removed prior to surgery.  The Pre-Admission Testing nurse will instruct you to bring medications if unable to obtain an accurate list in Pre-Admission Testing.            Preventing a Surgical Site Infection:  For 2 to 3 days before surgery,  avoid shaving with a razor because the razor can irritate skin and make it easier to develop an infection.    Any areas of open skin can increase the risk of a post-operative wound infection by allowing bacteria to enter and travel throughout the body.  Notify your surgeon if you have any skin wounds / rashes even if it is not near the expected surgical site.  The area will need assessed to determine if surgery should be delayed until it is healed.  The night prior to surgery shower using a fresh bar of anti-bacterial soap (such as Dial) and clean washcloth.  Sleep in a clean bed with clean clothing.  Do not allow pets to sleep with you.  Shower on the morning of surgery using a fresh bar of anti-bacterial soap (such as Dial) and clean washcloth.  Dry with a clean towel and dress in clean clothing.  Ask your surgeon if you will be receiving antibiotics prior to surgery.  Make sure you, your family, and all healthcare providers clean their hands with soap and water or an alcohol based hand  before caring for you or your wound.    Day of surgery:  Your arrival time is approximately two hours before your scheduled surgery time.  Please note if you have an early arrival time the surgery doors do not open before 5:00 AM.  Upon arrival, a Pre-op nurse and Anesthesiologist will review your health history, obtain vital signs, and answer questions you may have.  The only belongings needed at this time will be a list of your home medications and if applicable your C-PAP/BI-PAP machine.  A Pre-op nurse will start an IV and you may receive medication in preparation for surgery, including something to help you relax.     Please be aware that surgery does come with discomfort.  We want to make every effort to control your discomfort so please discuss any uncontrolled symptoms with your nurse.   Your doctor will most likely have prescribed pain medications.      If you are going home after surgery you will receive  individualized written care instructions before being discharged.  A responsible adult must drive you to and from the hospital on the day of your surgery and ideally stay with you through the night.   .  Discharge prescriptions can be filled by the hospital pharmacy during regular pharmacy hours.  If you are having surgery late in the day/evening your prescription may be e-prescribed to your pharmacy.  Please verify your pharmacy hours or chose a 24 hour pharmacy to avoid not having access to your prescription because your pharmacy has closed for the day.    If you are staying overnight following surgery, you will be transported to your hospital room following the recovery period.  [facility] has all private rooms.    If you have any questions please call Pre-Admission Testing at [phone].  Deductibles and co-payments are collected on the day of service. Please be prepared to pay the required co-pay, deductible or deposit on the day of service as defined by your plan.    Call your surgeon immediately if you experience any of the following symptoms:  Sore Throat  Shortness of Breath or difficulty breathing  Cough  Chills  Body soreness or muscle pain  Headache  Fever  New loss of taste or smell  Do not arrive for your surgery ill.  Your procedure will need to be rescheduled to another time.  You will need to call your physician before the day of surgery to avoid any unnecessary exposure to hospital staff as well as other patients.

## 2024-09-19 ENCOUNTER — OFFICE VISIT (OUTPATIENT)
Dept: FAMILY MEDICINE CLINIC | Facility: CLINIC | Age: 59
End: 2024-09-19
Payer: OTHER GOVERNMENT

## 2024-09-19 ENCOUNTER — TELEPHONE (OUTPATIENT)
Dept: FAMILY MEDICINE CLINIC | Facility: CLINIC | Age: 59
End: 2024-09-19

## 2024-09-19 ENCOUNTER — PRIOR AUTHORIZATION (OUTPATIENT)
Dept: FAMILY MEDICINE CLINIC | Facility: CLINIC | Age: 59
End: 2024-09-19

## 2024-09-19 VITALS
DIASTOLIC BLOOD PRESSURE: 80 MMHG | HEIGHT: 68 IN | TEMPERATURE: 98.1 F | HEART RATE: 78 BPM | SYSTOLIC BLOOD PRESSURE: 128 MMHG | WEIGHT: 162 LBS | OXYGEN SATURATION: 99 % | BODY MASS INDEX: 24.55 KG/M2

## 2024-09-19 DIAGNOSIS — I25.10 CORONARY ARTERY DISEASE, NON-OCCLUSIVE: ICD-10-CM

## 2024-09-19 DIAGNOSIS — I70.90 ATHEROSCLEROTIC VASCULAR DISEASE: ICD-10-CM

## 2024-09-19 DIAGNOSIS — Z09 FOLLOW-UP EXAM: Primary | ICD-10-CM

## 2024-09-19 DIAGNOSIS — Z78.9 STATIN INTOLERANCE: ICD-10-CM

## 2024-09-19 PROCEDURE — 99214 OFFICE O/P EST MOD 30 MIN: CPT | Performed by: NURSE PRACTITIONER

## 2024-09-19 RX ORDER — EZETIMIBE 10 MG/1
10 TABLET ORAL DAILY
Qty: 90 TABLET | Refills: 1 | Status: SHIPPED | OUTPATIENT
Start: 2024-09-19

## 2024-09-20 ENCOUNTER — ANESTHESIA EVENT (OUTPATIENT)
Dept: PERIOP | Facility: HOSPITAL | Age: 59
End: 2024-09-20
Payer: OTHER GOVERNMENT

## 2024-09-20 ENCOUNTER — HOSPITAL ENCOUNTER (OUTPATIENT)
Facility: HOSPITAL | Age: 59
Setting detail: HOSPITAL OUTPATIENT SURGERY
Discharge: HOME | End: 2024-09-20
Attending: PLASTIC SURGERY | Admitting: PLASTIC SURGERY
Payer: OTHER GOVERNMENT

## 2024-09-20 ENCOUNTER — ANESTHESIA (OUTPATIENT)
Dept: PERIOP | Facility: HOSPITAL | Age: 59
End: 2024-09-20
Payer: OTHER GOVERNMENT

## 2024-09-20 VITALS
DIASTOLIC BLOOD PRESSURE: 86 MMHG | WEIGHT: 162.7 LBS | BODY MASS INDEX: 26.15 KG/M2 | OXYGEN SATURATION: 90 % | HEIGHT: 66 IN | HEART RATE: 92 BPM | TEMPERATURE: 99.2 F | SYSTOLIC BLOOD PRESSURE: 134 MMHG | RESPIRATION RATE: 16 BRPM

## 2024-09-20 DIAGNOSIS — N62 MACROMASTIA: Primary | ICD-10-CM

## 2024-09-20 PROCEDURE — 25010000002 FENTANYL CITRATE (PF) 50 MCG/ML SOLUTION: Performed by: NURSE ANESTHETIST, CERTIFIED REGISTERED

## 2024-09-20 PROCEDURE — 25010000002 MIDAZOLAM PER 1 MG: Performed by: ANESTHESIOLOGY

## 2024-09-20 PROCEDURE — 25810000003 LACTATED RINGERS PER 1000 ML: Performed by: ANESTHESIOLOGY

## 2024-09-20 PROCEDURE — 25010000002 ONDANSETRON PER 1 MG: Performed by: NURSE ANESTHETIST, CERTIFIED REGISTERED

## 2024-09-20 PROCEDURE — 25010000002 DEXAMETHASONE SODIUM PHOSPHATE 20 MG/5ML SOLUTION: Performed by: NURSE ANESTHETIST, CERTIFIED REGISTERED

## 2024-09-20 PROCEDURE — 88305 TISSUE EXAM BY PATHOLOGIST: CPT | Performed by: PLASTIC SURGERY

## 2024-09-20 PROCEDURE — 25010000002 PROPOFOL 10 MG/ML EMULSION: Performed by: NURSE ANESTHETIST, CERTIFIED REGISTERED

## 2024-09-20 PROCEDURE — 25010000002 HYDROMORPHONE 1 MG/ML SOLUTION: Performed by: NURSE ANESTHETIST, CERTIFIED REGISTERED

## 2024-09-20 PROCEDURE — 25010000002 CEFAZOLIN PER 500 MG: Performed by: PLASTIC SURGERY

## 2024-09-20 PROCEDURE — 25010000002 GLYCOPYRROLATE 0.2 MG/ML SOLUTION: Performed by: NURSE ANESTHETIST, CERTIFIED REGISTERED

## 2024-09-20 PROCEDURE — 25010000002 KETOROLAC TROMETHAMINE PER 15 MG: Performed by: NURSE ANESTHETIST, CERTIFIED REGISTERED

## 2024-09-20 PROCEDURE — 25010000002 MAGNESIUM SULFATE PER 500 MG OF MAGNESIUM: Performed by: NURSE ANESTHETIST, CERTIFIED REGISTERED

## 2024-09-20 PROCEDURE — 25010000002 SUGAMMADEX 200 MG/2ML SOLUTION: Performed by: NURSE ANESTHETIST, CERTIFIED REGISTERED

## 2024-09-20 PROCEDURE — 25010000002 PROPOFOL 500 MG/50ML EMULSION: Performed by: NURSE ANESTHETIST, CERTIFIED REGISTERED

## 2024-09-20 RX ORDER — HYDROMORPHONE HYDROCHLORIDE 1 MG/ML
0.5 INJECTION, SOLUTION INTRAMUSCULAR; INTRAVENOUS; SUBCUTANEOUS
Status: DISCONTINUED | OUTPATIENT
Start: 2024-09-20 | End: 2024-09-20 | Stop reason: HOSPADM

## 2024-09-20 RX ORDER — NALOXONE HCL 0.4 MG/ML
0.2 VIAL (ML) INJECTION AS NEEDED
Status: DISCONTINUED | OUTPATIENT
Start: 2024-09-20 | End: 2024-09-20 | Stop reason: HOSPADM

## 2024-09-20 RX ORDER — SODIUM CHLORIDE, SODIUM LACTATE, POTASSIUM CHLORIDE, CALCIUM CHLORIDE 600; 310; 30; 20 MG/100ML; MG/100ML; MG/100ML; MG/100ML
9 INJECTION, SOLUTION INTRAVENOUS CONTINUOUS
Status: DISCONTINUED | OUTPATIENT
Start: 2024-09-20 | End: 2024-09-20 | Stop reason: HOSPADM

## 2024-09-20 RX ORDER — SODIUM CHLORIDE 0.9 % (FLUSH) 0.9 %
3 SYRINGE (ML) INJECTION EVERY 12 HOURS SCHEDULED
Status: DISCONTINUED | OUTPATIENT
Start: 2024-09-20 | End: 2024-09-20 | Stop reason: HOSPADM

## 2024-09-20 RX ORDER — PROPOFOL 10 MG/ML
VIAL (ML) INTRAVENOUS AS NEEDED
Status: DISCONTINUED | OUTPATIENT
Start: 2024-09-20 | End: 2024-09-20 | Stop reason: SURG

## 2024-09-20 RX ORDER — DIPHENHYDRAMINE HYDROCHLORIDE 50 MG/ML
12.5 INJECTION INTRAMUSCULAR; INTRAVENOUS
Status: DISCONTINUED | OUTPATIENT
Start: 2024-09-20 | End: 2024-09-20 | Stop reason: HOSPADM

## 2024-09-20 RX ORDER — HYDROCODONE BITARTRATE AND ACETAMINOPHEN 5; 325 MG/1; MG/1
1 TABLET ORAL EVERY 4 HOURS PRN
Qty: 28 TABLET | Refills: 0 | Status: SHIPPED | OUTPATIENT
Start: 2024-09-20

## 2024-09-20 RX ORDER — DROPERIDOL 2.5 MG/ML
0.62 INJECTION, SOLUTION INTRAMUSCULAR; INTRAVENOUS
Status: DISCONTINUED | OUTPATIENT
Start: 2024-09-20 | End: 2024-09-20 | Stop reason: HOSPADM

## 2024-09-20 RX ORDER — PROMETHAZINE HYDROCHLORIDE 25 MG/1
25 SUPPOSITORY RECTAL ONCE AS NEEDED
Status: DISCONTINUED | OUTPATIENT
Start: 2024-09-20 | End: 2024-09-20 | Stop reason: HOSPADM

## 2024-09-20 RX ORDER — IPRATROPIUM BROMIDE AND ALBUTEROL SULFATE 2.5; .5 MG/3ML; MG/3ML
3 SOLUTION RESPIRATORY (INHALATION) ONCE AS NEEDED
Status: DISCONTINUED | OUTPATIENT
Start: 2024-09-20 | End: 2024-09-20 | Stop reason: HOSPADM

## 2024-09-20 RX ORDER — SODIUM CHLORIDE 0.9 % (FLUSH) 0.9 %
3-10 SYRINGE (ML) INJECTION AS NEEDED
Status: DISCONTINUED | OUTPATIENT
Start: 2024-09-20 | End: 2024-09-20 | Stop reason: HOSPADM

## 2024-09-20 RX ORDER — HYDROCODONE BITARTRATE AND ACETAMINOPHEN 5; 325 MG/1; MG/1
1 TABLET ORAL ONCE AS NEEDED
Status: COMPLETED | OUTPATIENT
Start: 2024-09-20 | End: 2024-09-20

## 2024-09-20 RX ORDER — HYDROCODONE BITARTRATE AND ACETAMINOPHEN 5; 325 MG/1; MG/1
1 TABLET ORAL ONCE AS NEEDED
Status: DISCONTINUED | OUTPATIENT
Start: 2024-09-20 | End: 2024-09-20 | Stop reason: HOSPADM

## 2024-09-20 RX ORDER — FENTANYL CITRATE 50 UG/ML
50 INJECTION, SOLUTION INTRAMUSCULAR; INTRAVENOUS
Status: DISCONTINUED | OUTPATIENT
Start: 2024-09-20 | End: 2024-09-20 | Stop reason: HOSPADM

## 2024-09-20 RX ORDER — BUPIVACAINE HYDROCHLORIDE AND EPINEPHRINE 2.5; 5 MG/ML; UG/ML
INJECTION, SOLUTION EPIDURAL; INFILTRATION; INTRACAUDAL; PERINEURAL AS NEEDED
Status: DISCONTINUED | OUTPATIENT
Start: 2024-09-20 | End: 2024-09-20 | Stop reason: HOSPADM

## 2024-09-20 RX ORDER — MAGNESIUM SULFATE HEPTAHYDRATE 500 MG/ML
INJECTION, SOLUTION INTRAMUSCULAR; INTRAVENOUS AS NEEDED
Status: DISCONTINUED | OUTPATIENT
Start: 2024-09-20 | End: 2024-09-20 | Stop reason: SURG

## 2024-09-20 RX ORDER — ROCURONIUM BROMIDE 10 MG/ML
INJECTION, SOLUTION INTRAVENOUS AS NEEDED
Status: DISCONTINUED | OUTPATIENT
Start: 2024-09-20 | End: 2024-09-20 | Stop reason: SURG

## 2024-09-20 RX ORDER — FLUMAZENIL 0.1 MG/ML
0.2 INJECTION INTRAVENOUS AS NEEDED
Status: DISCONTINUED | OUTPATIENT
Start: 2024-09-20 | End: 2024-09-20 | Stop reason: HOSPADM

## 2024-09-20 RX ORDER — SODIUM CHLORIDE 9 MG/ML
40 INJECTION, SOLUTION INTRAVENOUS AS NEEDED
Status: DISCONTINUED | OUTPATIENT
Start: 2024-09-20 | End: 2024-09-20 | Stop reason: HOSPADM

## 2024-09-20 RX ORDER — HYDROCODONE BITARTRATE AND ACETAMINOPHEN 10; 325 MG/1; MG/1
1 TABLET ORAL EVERY 4 HOURS PRN
Status: DISCONTINUED | OUTPATIENT
Start: 2024-09-20 | End: 2024-09-20 | Stop reason: HOSPADM

## 2024-09-20 RX ORDER — ACETAMINOPHEN 500 MG
1000 TABLET ORAL ONCE
Status: COMPLETED | OUTPATIENT
Start: 2024-09-20 | End: 2024-09-20

## 2024-09-20 RX ORDER — SCOLOPAMINE TRANSDERMAL SYSTEM 1 MG/1
1 PATCH, EXTENDED RELEASE TRANSDERMAL ONCE
Status: DISCONTINUED | OUTPATIENT
Start: 2024-09-20 | End: 2024-09-20 | Stop reason: HOSPADM

## 2024-09-20 RX ORDER — PROMETHAZINE HYDROCHLORIDE 25 MG/1
25 TABLET ORAL ONCE AS NEEDED
Status: DISCONTINUED | OUTPATIENT
Start: 2024-09-20 | End: 2024-09-20 | Stop reason: HOSPADM

## 2024-09-20 RX ORDER — ONDANSETRON 2 MG/ML
INJECTION INTRAMUSCULAR; INTRAVENOUS AS NEEDED
Status: DISCONTINUED | OUTPATIENT
Start: 2024-09-20 | End: 2024-09-20 | Stop reason: SURG

## 2024-09-20 RX ORDER — MIDAZOLAM HYDROCHLORIDE 1 MG/ML
1 INJECTION INTRAMUSCULAR; INTRAVENOUS
Status: DISCONTINUED | OUTPATIENT
Start: 2024-09-20 | End: 2024-09-20 | Stop reason: HOSPADM

## 2024-09-20 RX ORDER — SCOLOPAMINE TRANSDERMAL SYSTEM 1 MG/1
1 PATCH, EXTENDED RELEASE TRANSDERMAL ONCE
Status: DISCONTINUED | OUTPATIENT
Start: 2024-09-20 | End: 2024-09-20 | Stop reason: SDUPTHER

## 2024-09-20 RX ORDER — MAGNESIUM HYDROXIDE 1200 MG/15ML
LIQUID ORAL AS NEEDED
Status: DISCONTINUED | OUTPATIENT
Start: 2024-09-20 | End: 2024-09-20 | Stop reason: HOSPADM

## 2024-09-20 RX ORDER — LIDOCAINE HYDROCHLORIDE 20 MG/ML
INJECTION, SOLUTION INFILTRATION; PERINEURAL AS NEEDED
Status: DISCONTINUED | OUTPATIENT
Start: 2024-09-20 | End: 2024-09-20 | Stop reason: SURG

## 2024-09-20 RX ORDER — DEXAMETHASONE SODIUM PHOSPHATE 4 MG/ML
INJECTION, SOLUTION INTRA-ARTICULAR; INTRALESIONAL; INTRAMUSCULAR; INTRAVENOUS; SOFT TISSUE AS NEEDED
Status: DISCONTINUED | OUTPATIENT
Start: 2024-09-20 | End: 2024-09-20 | Stop reason: SURG

## 2024-09-20 RX ORDER — PROPOFOL 10 MG/ML
INJECTION, EMULSION INTRAVENOUS CONTINUOUS PRN
Status: DISCONTINUED | OUTPATIENT
Start: 2024-09-20 | End: 2024-09-20 | Stop reason: SURG

## 2024-09-20 RX ORDER — ONDANSETRON 2 MG/ML
4 INJECTION INTRAMUSCULAR; INTRAVENOUS ONCE AS NEEDED
Status: COMPLETED | OUTPATIENT
Start: 2024-09-20 | End: 2024-09-20

## 2024-09-20 RX ORDER — HYDRALAZINE HYDROCHLORIDE 20 MG/ML
5 INJECTION INTRAMUSCULAR; INTRAVENOUS
Status: DISCONTINUED | OUTPATIENT
Start: 2024-09-20 | End: 2024-09-20 | Stop reason: HOSPADM

## 2024-09-20 RX ORDER — EPHEDRINE SULFATE 50 MG/ML
5 INJECTION, SOLUTION INTRAVENOUS ONCE AS NEEDED
Status: DISCONTINUED | OUTPATIENT
Start: 2024-09-20 | End: 2024-09-20 | Stop reason: HOSPADM

## 2024-09-20 RX ORDER — KETOROLAC TROMETHAMINE 30 MG/ML
INJECTION, SOLUTION INTRAMUSCULAR; INTRAVENOUS AS NEEDED
Status: DISCONTINUED | OUTPATIENT
Start: 2024-09-20 | End: 2024-09-20 | Stop reason: SURG

## 2024-09-20 RX ORDER — FENTANYL CITRATE 50 UG/ML
INJECTION, SOLUTION INTRAMUSCULAR; INTRAVENOUS AS NEEDED
Status: DISCONTINUED | OUTPATIENT
Start: 2024-09-20 | End: 2024-09-20 | Stop reason: SURG

## 2024-09-20 RX ORDER — GLYCOPYRROLATE 0.2 MG/ML
INJECTION INTRAMUSCULAR; INTRAVENOUS AS NEEDED
Status: DISCONTINUED | OUTPATIENT
Start: 2024-09-20 | End: 2024-09-20 | Stop reason: SURG

## 2024-09-20 RX ORDER — LABETALOL HYDROCHLORIDE 5 MG/ML
5 INJECTION, SOLUTION INTRAVENOUS
Status: DISCONTINUED | OUTPATIENT
Start: 2024-09-20 | End: 2024-09-20 | Stop reason: HOSPADM

## 2024-09-20 RX ORDER — FAMOTIDINE 10 MG/ML
20 INJECTION, SOLUTION INTRAVENOUS ONCE
Status: COMPLETED | OUTPATIENT
Start: 2024-09-20 | End: 2024-09-20

## 2024-09-20 RX ADMIN — HYDROMORPHONE HYDROCHLORIDE 0.5 MG: 1 INJECTION, SOLUTION INTRAMUSCULAR; INTRAVENOUS; SUBCUTANEOUS at 11:37

## 2024-09-20 RX ADMIN — FENTANYL CITRATE 50 MCG: 50 INJECTION, SOLUTION INTRAMUSCULAR; INTRAVENOUS at 08:18

## 2024-09-20 RX ADMIN — LIDOCAINE HYDROCHLORIDE 100 MG: 20 INJECTION, SOLUTION INFILTRATION; PERINEURAL at 09:43

## 2024-09-20 RX ADMIN — HYDROMORPHONE HYDROCHLORIDE 0.5 MG: 1 INJECTION, SOLUTION INTRAMUSCULAR; INTRAVENOUS; SUBCUTANEOUS at 10:45

## 2024-09-20 RX ADMIN — Medication 25 MG: at 08:18

## 2024-09-20 RX ADMIN — KETOROLAC TROMETHAMINE 30 MG: 30 INJECTION, SOLUTION INTRAMUSCULAR at 11:31

## 2024-09-20 RX ADMIN — SODIUM CHLORIDE 2000 MG: 900 INJECTION INTRAVENOUS at 08:07

## 2024-09-20 RX ADMIN — Medication 15 MG: at 09:04

## 2024-09-20 RX ADMIN — ACETAMINOPHEN 1000 MG: 500 TABLET ORAL at 07:12

## 2024-09-20 RX ADMIN — Medication 3 ML: at 07:17

## 2024-09-20 RX ADMIN — SUGAMMADEX 200 MG: 100 INJECTION, SOLUTION INTRAVENOUS at 11:31

## 2024-09-20 RX ADMIN — Medication 10 MG: at 09:00

## 2024-09-20 RX ADMIN — ONDANSETRON 4 MG: 2 INJECTION INTRAMUSCULAR; INTRAVENOUS at 13:58

## 2024-09-20 RX ADMIN — SODIUM CHLORIDE, POTASSIUM CHLORIDE, SODIUM LACTATE AND CALCIUM CHLORIDE: 600; 310; 30; 20 INJECTION, SOLUTION INTRAVENOUS at 09:31

## 2024-09-20 RX ADMIN — SCOPALAMINE 1 PATCH: 1 PATCH, EXTENDED RELEASE TRANSDERMAL at 07:12

## 2024-09-20 RX ADMIN — Medication 10 MG: at 09:43

## 2024-09-20 RX ADMIN — Medication 10 MG: at 10:36

## 2024-09-20 RX ADMIN — DEXAMETHASONE SODIUM PHOSPHATE 10 MG: 4 INJECTION, SOLUTION INTRAMUSCULAR; INTRAVENOUS at 08:35

## 2024-09-20 RX ADMIN — ROCURONIUM BROMIDE 50 MG: 10 INJECTION, SOLUTION INTRAVENOUS at 08:19

## 2024-09-20 RX ADMIN — LIDOCAINE HYDROCHLORIDE 80 MG: 20 INJECTION, SOLUTION INFILTRATION; PERINEURAL at 08:18

## 2024-09-20 RX ADMIN — PROPOFOL 150 MG: 10 INJECTION, EMULSION INTRAVENOUS at 08:18

## 2024-09-20 RX ADMIN — MAGNESIUM SULFATE HEPTAHYDRATE 2 G: 500 INJECTION, SOLUTION INTRAMUSCULAR; INTRAVENOUS at 08:34

## 2024-09-20 RX ADMIN — PROPOFOL 50 MCG/KG/MIN: 10 INJECTION, EMULSION INTRAVENOUS at 08:40

## 2024-09-20 RX ADMIN — MIDAZOLAM 1 MG: 1 INJECTION INTRAMUSCULAR; INTRAVENOUS at 07:13

## 2024-09-20 RX ADMIN — GLYCOPYRROLATE 0.2 MG: 0.2 INJECTION INTRAMUSCULAR; INTRAVENOUS at 11:31

## 2024-09-20 RX ADMIN — HYDROCODONE BITARTRATE AND ACETAMINOPHEN 1 TABLET: 5; 325 TABLET ORAL at 13:20

## 2024-09-20 RX ADMIN — SODIUM CHLORIDE, POTASSIUM CHLORIDE, SODIUM LACTATE AND CALCIUM CHLORIDE 9 ML/HR: 600; 310; 30; 20 INJECTION, SOLUTION INTRAVENOUS at 07:12

## 2024-09-20 RX ADMIN — ONDANSETRON 4 MG: 2 INJECTION INTRAMUSCULAR; INTRAVENOUS at 11:18

## 2024-09-20 RX ADMIN — FAMOTIDINE 20 MG: 10 INJECTION INTRAVENOUS at 07:13

## 2024-09-20 RX ADMIN — FENTANYL CITRATE 50 MCG: 50 INJECTION, SOLUTION INTRAMUSCULAR; INTRAVENOUS at 09:53

## 2024-09-20 RX ADMIN — FENTANYL CITRATE 50 MCG: 50 INJECTION, SOLUTION INTRAMUSCULAR; INTRAVENOUS at 09:06

## 2024-09-21 LAB
CYTO UR: NORMAL
LAB AP CASE REPORT: NORMAL
PATH REPORT.FINAL DX SPEC: NORMAL
PATH REPORT.GROSS SPEC: NORMAL

## 2024-10-18 ENCOUNTER — OFFICE VISIT (OUTPATIENT)
Dept: FAMILY MEDICINE CLINIC | Facility: CLINIC | Age: 59
End: 2024-10-18
Payer: OTHER GOVERNMENT

## 2024-10-18 VITALS
DIASTOLIC BLOOD PRESSURE: 74 MMHG | BODY MASS INDEX: 26.52 KG/M2 | HEART RATE: 97 BPM | OXYGEN SATURATION: 98 % | HEIGHT: 66 IN | WEIGHT: 165 LBS | TEMPERATURE: 98 F | SYSTOLIC BLOOD PRESSURE: 142 MMHG

## 2024-10-18 DIAGNOSIS — R06.09 EXERTIONAL DYSPNEA: ICD-10-CM

## 2024-10-18 DIAGNOSIS — Z12.11 COLON CANCER SCREENING: ICD-10-CM

## 2024-10-18 DIAGNOSIS — N39.0 URINARY TRACT INFECTION WITHOUT HEMATURIA, SITE UNSPECIFIED: Primary | ICD-10-CM

## 2024-10-18 LAB
BILIRUB BLD-MCNC: NEGATIVE MG/DL
CLARITY, POC: CLEAR
COLOR UR: YELLOW
EXPIRATION DATE: ABNORMAL
GLUCOSE UR STRIP-MCNC: NEGATIVE MG/DL
KETONES UR QL: NEGATIVE
LEUKOCYTE EST, POC: NEGATIVE
Lab: ABNORMAL
NITRITE UR-MCNC: NEGATIVE MG/ML
PH UR: 7 [PH] (ref 5–8)
PROT UR STRIP-MCNC: NEGATIVE MG/DL
RBC # UR STRIP: ABNORMAL /UL
SP GR UR: 1.01 (ref 1–1.03)
UROBILINOGEN UR QL: ABNORMAL

## 2024-10-18 RX ORDER — GUAIFENESIN 200 MG/1
400 TABLET ORAL EVERY 4 HOURS PRN
Qty: 60 TABLET | Refills: 1 | Status: SHIPPED | OUTPATIENT
Start: 2024-10-18

## 2024-10-18 RX ORDER — BENZONATATE 200 MG/1
200 CAPSULE ORAL 3 TIMES DAILY PRN
Qty: 60 CAPSULE | Refills: 1 | Status: SHIPPED | OUTPATIENT
Start: 2024-10-18

## 2024-10-18 NOTE — PROGRESS NOTES
Chief Complaint  Follow-up (3wk F/U), Urinary Tract Infection (Pt reports UTI symptoms onset about a week ago with irritation with urination and Right side Back pain), and Back Pain (Pt reports Right side Back pain, 8/10 p.s)    Subjective        Medical History: has a past medical history of Acid reflux, Anxiety, Arthritis, Depression, Disease of thyroid gland, Gout attack, Hypertension, Hypothyroidism, Kidney disease, chronic, stage II (GFR 60-89 ml/min), Macromastia, OAB (overactive bladder), PONV (postoperative nausea and vomiting), Renal artery stenosis, SOB (shortness of breath) on exertion, and Stress incontinence.     Surgical History: has a past surgical history that includes Appendectomy (1983); Lymph node biopsy (Left, 2001); Cholecystectomy (2012); Laparoscopic tubal ligation (Bilateral, 1994); Abdominoplasty (2016); Knee arthroscopy (Right); Dental surgery (2008); Laparoscopy (Bilateral, 10/17/2016); Colonoscopy (2013); Laparoscopy (Right, 05/07/2018); Esophagogastroduodenoscopy (2013); Blepharoplasty (Bilateral, 12/15/2022); Head/Neck Lesion/Cyst Excision (Left, 12/15/2022); Blepharoplasty (Bilateral, 12/15/2022); Cardiac catheterization (N/A, 03/06/2023); transvaginal taping suspension (N/A, 06/03/2024); and Breast Reduction (Bilateral, 9/20/2024).     Family History: family history includes Colon cancer in her maternal grandfather.     Social History: reports that she quit smoking about 6 months ago. Her smoking use included cigarettes. She started smoking about 39 years ago. She has a 58.9 pack-year smoking history. She has never used smokeless tobacco. She reports that she does not currently use alcohol. She reports that she does not use drugs.    Waleska Shah presents to [facility]  Follow-up  Urinary Tract Infection     Back Pain        Objective   Vital Signs:   /74 (BP Location: Right arm, Patient Position: Sitting, Cuff Size: Adult)   Pulse 97    "Temp 98 °F (36.7 °C) (Infrared)   Ht 167.6 cm (66\")   Wt 74.8 kg (165 lb)   SpO2 98%   BMI 26.63 kg/m²       Wt Readings from Last 3 Encounters:   10/18/24 74.8 kg (165 lb)   09/20/24 73.8 kg (162 lb 11.2 oz)   09/19/24 73.5 kg (162 lb)        BP Readings from Last 3 Encounters:   10/18/24 142/74   09/20/24 134/86   09/19/24 128/80        {BMI is >= 25 and <30. (Overweight) The following options were offered after discussion;:[id number]}       Physical Exam  Vitals reviewed.   Constitutional:       General: She is not in acute distress.     Appearance: Normal appearance. She is well-developed. She is not ill-appearing.   HENT:      Head: Normocephalic and atraumatic.   Eyes:      Conjunctiva/sclera: Conjunctivae normal.      Pupils: Pupils are equal, round, and reactive to light.   Cardiovascular:      Rate and Rhythm: Normal rate and regular rhythm.      Heart sounds: No murmur heard.  Pulmonary:      Effort: Pulmonary effort is normal.      Breath sounds: Normal breath sounds. No wheezing.   Musculoskeletal:      Right lower leg: No edema.      Left lower leg: No edema.   Skin:     General: Skin is warm and dry.   Neurological:      Mental Status: She is alert and oriented to person, place, and time.   Psychiatric:         Mood and Affect: Mood and affect normal.         Behavior: Behavior normal.         Thought Content: Thought content normal.         Judgment: Judgment normal.        Result Review :  {The following data was reviewed by LASHANDA Urban on 10/18/2024.  Common labs          5/17/2024    09:35 6/13/2024    09:22 8/29/2024    10:31   Common Labs   Glucose 102   98    BUN 17   16    Creatinine 0.79   1.07    Sodium 141   141    Potassium 4.5   5.0    Chloride 105   106    Calcium 10.0   10.1    WBC 6.89  6.62  6.17    Hemoglobin 13.3  14.1  12.9    Hematocrit 39.4  41.0  38.9    Platelets 292  326  317    Uric Acid  6.9       Data reviewed : previous office note           Pulmonary " Function Test Interpretation  Waleska Shah  [id number]     04/16/24  07:50 EDT     Spirometry  PFT shows a forced vital capacity of 94% of predicted FEV1 is 87% of predicted FEV1 to FVC ratio is 73 postbronchodilator the FEV1 was 91% of predicted  Total lung capacity is 99% of predicted  Diffusion capacity 78% of predicted  Assessment  Normal spirometry no airways obstruction noted  No response to bronchodilators  Lung volumes are normal  Mild reduction in diffusion capacity  Flow-volume loop shows good effort          Current Outpatient Medications on File Prior to Visit   Medication Sig Dispense Refill    allopurinol (ZYLOPRIM) 300 MG tablet Take 1 tablet by mouth Daily. 90 tablet 1    clonazePAM (KlonoPIN) 0.5 MG tablet Take 0.5-1 tablets by mouth 2 (Two) Times a Day As Needed.      Cyanocobalamin (B-12 PO) Take 1 tablet by mouth Daily. PT HOLDING FOR SURGERY      Diclofenac Sodium (VOLTAREN) 1 % gel gel Apply 4 g topically to the appropriate area as directed 4 (Four) Times a Day As Needed (pain). (Patient taking differently: Apply 4 g topically to the appropriate area as directed 4 (Four) Times a Day As Needed (pain). TO HOLD 1 WEEK BEFORE SURGERY) 100 g 1    Diclofenac Sodium (VOLTAREN) 1 % gel gel Apply 4 g topically to the appropriate area as directed 4 (Four) Times a Day. 100 g 5    DULoxetine (CYMBALTA) 20 MG capsule Take 1 capsule by mouth Every Night. 90 capsule 1    estradiol (ESTRACE) 0.1 MG/GM vaginal cream 1 (One) Time Per Week. WEDNESDAYS      Evolocumab (REPATHA) solution auto-injector SureClick injection Inject 1 mL under the skin into the appropriate area as directed Every 14 (Fourteen) Days. 6 mL 1    ezetimibe (Zetia) 10 MG tablet Take 1 tablet by mouth Daily. 90 tablet 1    fluticasone (FLONASE) 50 MCG/ACT nasal spray 2 sprays into the nostril(s) as directed by provider Daily. (Patient taking differently: Administer 2 sprays into the nostril(s) as directed by provider As Needed.) 16 g 1     HYDROcodone-acetaminophen (NORCO) 5-325 MG per tablet Take 1 tablet by mouth Every 4 (Four) Hours As Needed for Moderate Pain (Pain) for up to 28 doses. 28 tablet 0    indomethacin (INDOCIN) 25 MG capsule Take 1-2 capsules by mouth 2 (Two) Times a Day With Meals. (Patient taking differently: Take 1-2 capsules by mouth 2 (Two) Times a Day As Needed. TO HOLD 1 WEEK BEFORE SURGERY) 60 capsule 2    lidocaine (Lidoderm) 5 % Place 1 patch on the skin as directed by provider Daily. Remove & Discard patch within 12 hours or as directed by MD (Patient taking differently: Place 1 patch on the skin as directed by provider 2 (Two) Times a Day As Needed. Remove & Discard patch within 12 hours or as directed by MD) 30 patch 3    vitamin D (ERGOCALCIFEROL) 1.25 MG (60492 UT) capsule capsule Take 1 capsule by mouth Every 7 (Seven) Days. WEDNESDAYS 13 capsule 1     No current facility-administered medications on file prior to visit.      Brief Urine Lab Results  (Last result in the past 365 days)        Color   Clarity   Blood   Leuk Est   Nitrite   Protein   CREAT   Urine HCG        10/18/24 1550 Yellow   Clear   Trace   Negative   Negative   Negative                  Assessment and Plan  Diagnoses and all orders for this visit:    1. Urinary tract infection without hematuria, site unspecified (Primary)  -     POCT urinalysis dipstick, automated    2. Exertional dyspnea  -     Ambulatory Referral to Pulmonology    3. Colon cancer screening  -     Ambulatory Referral to Gastroenterology    Other orders  -     benzonatate (TESSALON) 200 MG capsule; Take 1 capsule by mouth 3 (Three) Times a Day As Needed for Cough.  Dispense: 60 capsule; Refill: 1  -     guaiFENesin 200 MG tablet; Take 2 tablets by mouth Every 4 (Four) Hours As Needed for Congestion or Cough.  Dispense: 60 tablet; Refill: 1      {Time Spent (Optional):49639}  Follow Up   No follow-ups on file.  Patient was given instructions and counseling regarding her condition or  for health maintenance advice. Please see specific information pulled into the AVS if appropriate.       Part of this note may be electronic transcription/translation of spoken language to printed text using the Dragon dictation system

## 2024-10-18 NOTE — PROGRESS NOTES
PA Rx APPROVED for Repatha SureClick 140MG/ML auto-injectors - Waleska Shah (Key: [id number])  PA Case ID #: [id number]    Need Help? Call us at [phone]    Outcome - Approved today by [employer]  2017  CaseId:[id number];Status:Approved;Review Type:Prior Auth;Coverage Start Date:09/18/2024;Coverage End Date:12/31/2099;    Authorization Expiration Date: 12/30/2099    Drug - Repatha SureClick 140MG/ML auto-injectors    Form - Dede Electronic PA Form (2017 [employer])

## 2024-10-20 NOTE — PROGRESS NOTES
Chief Complaint  Follow-up (3wk F/U), Urinary Tract Infection (Pt reports UTI symptoms onset about a week ago with irritation with urination and Right side Back pain), and Back Pain (Pt reports Right side Back pain, 8/10 p.s)    Subjective        Medical History: has a past medical history of Acid reflux, Anxiety, Arthritis, Depression, Disease of thyroid gland, Gout attack, Hypertension, Hypothyroidism, Kidney disease, chronic, stage II (GFR 60-89 ml/min), Macromastia, OAB (overactive bladder), PONV (postoperative nausea and vomiting), Renal artery stenosis, SOB (shortness of breath) on exertion, and Stress incontinence.     Surgical History: has a past surgical history that includes Appendectomy (1983); Lymph node biopsy (Left, 2001); Cholecystectomy (2012); Laparoscopic tubal ligation (Bilateral, 1994); Abdominoplasty (2016); Knee arthroscopy (Right); Dental surgery (2008); Laparoscopy (Bilateral, 10/17/2016); Colonoscopy (2013); Laparoscopy (Right, 05/07/2018); Esophagogastroduodenoscopy (2013); Blepharoplasty (Bilateral, 12/15/2022); Head/Neck Lesion/Cyst Excision (Left, 12/15/2022); Blepharoplasty (Bilateral, 12/15/2022); Cardiac catheterization (N/A, 03/06/2023); transvaginal taping suspension (N/A, 06/03/2024); and Breast Reduction (Bilateral, 9/20/2024).     Family History: family history includes Colon cancer in her maternal grandfather.     Social History: reports that she quit smoking about 6 months ago. Her smoking use included cigarettes. She started smoking about 39 years ago. She has a 58.9 pack-year smoking history. She has never used smokeless tobacco. She reports that she does not currently use alcohol. She reports that she does not use drugs.    Waleska Shah presents to [facility]    History of Present Illness    History of Present Illness  The patient presents for evaluation of multiple medical concerns.    Patient underwent surgery for bilateral breast  "reduction.  He is doing well postop, with minimal pain at the incision site.  She has a follow-up appointment in a few weeks with the plastic surgeon.    She is discussing for bio oil to manage scarring, as she plans to remove her bandages by the end of this week.    She has been experiencing shortness of breath for some time now. She was previously prescribed cough medicine by her pulmonologist, but she did not attend the follow-up appointment. She also reports a persistent cough with phlegm production.  She did quit smoking months prior to the breast reduction.    She has been approved for Repatha injections.    She does not have a urinary tract infection (UTI), but she is experiencing irritation.    Supplemental Information    She is wanting a referral to gastroenterology for colonoscopy      Objective   Vital Signs:   /74 (BP Location: Right arm, Patient Position: Sitting, Cuff Size: Adult)   Pulse 97   Temp 98 °F (36.7 °C) (Infrared)   Ht 167.6 cm (66\")   Wt 74.8 kg (165 lb)   SpO2 98%   BMI 26.63 kg/m²       Wt Readings from Last 3 Encounters:   10/18/24 74.8 kg (165 lb)   09/20/24 73.8 kg (162 lb 11.2 oz)   09/19/24 73.5 kg (162 lb)        BP Readings from Last 3 Encounters:   10/18/24 142/74   09/20/24 134/86   09/19/24 128/80        BMI is >= 25 and <30. (Overweight) The following options were offered after discussion;: weight loss educational material (shared in after visit summary)       Physical Exam   Result Review :  {The following data was reviewed by LASHANDA Urban on 10/18/2024.  Common labs          5/17/2024    09:35 6/13/2024    09:22 8/29/2024    10:31   Common Labs   Glucose 102   98    BUN 17   16    Creatinine 0.79   1.07    Sodium 141   141    Potassium 4.5   5.0    Chloride 105   106    Calcium 10.0   10.1    WBC 6.89  6.62  6.17    Hemoglobin 13.3  14.1  12.9    Hematocrit 39.4  41.0  38.9    Platelets 292  326  317    Uric Acid  6.9       Data reviewed : recent " surgical note             Current Outpatient Medications on File Prior to Visit   Medication Sig Dispense Refill    allopurinol (ZYLOPRIM) 300 MG tablet Take 1 tablet by mouth Daily. 90 tablet 1    clonazePAM (KlonoPIN) 0.5 MG tablet Take 0.5-1 tablets by mouth 2 (Two) Times a Day As Needed.      Cyanocobalamin (B-12 PO) Take 1 tablet by mouth Daily. PT HOLDING FOR SURGERY      Diclofenac Sodium (VOLTAREN) 1 % gel gel Apply 4 g topically to the appropriate area as directed 4 (Four) Times a Day As Needed (pain). (Patient taking differently: Apply 4 g topically to the appropriate area as directed 4 (Four) Times a Day As Needed (pain). TO HOLD 1 WEEK BEFORE SURGERY) 100 g 1    Diclofenac Sodium (VOLTAREN) 1 % gel gel Apply 4 g topically to the appropriate area as directed 4 (Four) Times a Day. 100 g 5    DULoxetine (CYMBALTA) 20 MG capsule Take 1 capsule by mouth Every Night. 90 capsule 1    estradiol (ESTRACE) 0.1 MG/GM vaginal cream 1 (One) Time Per Week. WEDNESDAYS      ezetimibe (Zetia) 10 MG tablet Take 1 tablet by mouth Daily. 90 tablet 1    fluticasone (FLONASE) 50 MCG/ACT nasal spray 2 sprays into the nostril(s) as directed by provider Daily. (Patient taking differently: Administer 2 sprays into the nostril(s) as directed by provider As Needed.) 16 g 1    HYDROcodone-acetaminophen (NORCO) 5-325 MG per tablet Take 1 tablet by mouth Every 4 (Four) Hours As Needed for Moderate Pain (Pain) for up to 28 doses. 28 tablet 0    indomethacin (INDOCIN) 25 MG capsule Take 1-2 capsules by mouth 2 (Two) Times a Day With Meals. (Patient taking differently: Take 1-2 capsules by mouth 2 (Two) Times a Day As Needed. TO HOLD 1 WEEK BEFORE SURGERY) 60 capsule 2    lidocaine (Lidoderm) 5 % Place 1 patch on the skin as directed by provider Daily. Remove & Discard patch within 12 hours or as directed by MD (Patient taking differently: Place 1 patch on the skin as directed by provider 2 (Two) Times a Day As Needed. Remove & Discard  patch within 12 hours or as directed by MD) 30 patch 3    vitamin D (ERGOCALCIFEROL) 1.25 MG (89465 UT) capsule capsule Take 1 capsule by mouth Every 7 (Seven) Days. WEDNESDAYS 13 capsule 1     No current facility-administered medications on file prior to visit.      Brief Urine Lab Results  (Last result in the past 365 days)        Color   Clarity   Blood   Leuk Est   Nitrite   Protein   CREAT   Urine HCG        10/18/24 1550 Yellow   Clear   Trace   Negative   Negative   Negative                  Assessment and Plan  Diagnoses and all orders for this visit:    1. Urinary tract infection without hematuria, site unspecified (Primary)  -     POCT urinalysis dipstick, automated    2. Exertional dyspnea  -     Ambulatory Referral to Pulmonology    3. Colon cancer screening  -     Ambulatory Referral to Gastroenterology    Other orders  -     benzonatate (TESSALON) 200 MG capsule; Take 1 capsule by mouth 3 (Three) Times a Day As Needed for Cough.  Dispense: 60 capsule; Refill: 1  -     guaiFENesin 200 MG tablet; Take 2 tablets by mouth Every 4 (Four) Hours As Needed for Congestion or Cough.  Dispense: 60 tablet; Refill: 1  -     Evolocumab (REPATHA) solution auto-injector SureClick injection; Inject 1 mL under the skin into the appropriate area as directed Every 14 (Fourteen) Days.  Dispense: 6 mL; Refill: 1        Assessment & Plan  1. Scarring.  Bio oil was recommended for managing scarring. It can be purchased at [employer].    2. Cough.  A stronger dose of Tessalon Perles capsules and Mucinex was prescribed. The recommended dosage for Mucinex is 2 tablets every 4 hours for the next week, and then as needed thereafter. If she develops a fever or feels unwell, she should inform the clinic so that an antibiotic can be prescribed.    3. Hyperlipidemia.  Repatha was approved and can be picked up at [facility].    4. Urinary tract irritation.  She was advised to increase fluids, may be experiencing postmenopausal vaginal  hypertrophy.  Did discuss possible estrogen vaginal cream to aid in the symptoms if it does not resolve on its own.          Follow Up   Return for If symptoms do not improve new concerning symptoms.  Patient was given instructions and counseling regarding her condition or for health maintenance advice. Please see specific information pulled into the AVS if appropriate.       Part of this note may be electronic transcription/translation of spoken language to printed text using the Dragon dictation system    Patient or patient representative verbalized consent for the use of Ambient Listening during the visit with  LASHANDA Urban for chart documentation. 10/20/2024  15:58 EDT

## 2024-10-23 ENCOUNTER — TELEPHONE (OUTPATIENT)
Dept: FAMILY MEDICINE CLINIC | Facility: CLINIC | Age: 59
End: 2024-10-23
Payer: OTHER GOVERNMENT

## 2024-10-24 DIAGNOSIS — R05.2 SUBACUTE COUGH: Primary | ICD-10-CM

## 2024-11-20 ENCOUNTER — OFFICE VISIT (OUTPATIENT)
Dept: FAMILY MEDICINE CLINIC | Facility: CLINIC | Age: 59
End: 2024-11-20
Payer: OTHER GOVERNMENT

## 2024-11-20 VITALS
TEMPERATURE: 97.1 F | HEART RATE: 81 BPM | WEIGHT: 161 LBS | OXYGEN SATURATION: 99 % | BODY MASS INDEX: 25.88 KG/M2 | HEIGHT: 66 IN | SYSTOLIC BLOOD PRESSURE: 132 MMHG | DIASTOLIC BLOOD PRESSURE: 88 MMHG

## 2024-11-20 DIAGNOSIS — Z09 FOLLOW-UP EXAM: Primary | ICD-10-CM

## 2024-11-20 DIAGNOSIS — R19.7 DIARRHEA, UNSPECIFIED TYPE: ICD-10-CM

## 2024-11-20 DIAGNOSIS — R05.2 SUBACUTE COUGH: ICD-10-CM

## 2024-11-20 PROCEDURE — 99213 OFFICE O/P EST LOW 20 MIN: CPT | Performed by: NURSE PRACTITIONER

## 2024-11-20 RX ORDER — HYDROCODONE POLISTIREX AND CHLORPHENIRAMINE POLISTIREX 10; 8 MG/5ML; MG/5ML
5 SUSPENSION, EXTENDED RELEASE ORAL EVERY 12 HOURS PRN
Qty: 120 ML | Refills: 0 | Status: SHIPPED | OUTPATIENT
Start: 2024-11-20 | End: 2024-11-30

## 2024-11-20 NOTE — PROGRESS NOTES
Chief Complaint  Follow-up (1m F/U) and Cough (Persistent cough for 1m )    Subjective        Medical History: has a past medical history of Acid reflux, Anxiety, Arthritis, Depression, Disease of thyroid gland, Gout attack, Hypertension, Hypothyroidism, Kidney disease, chronic, stage II (GFR 60-89 ml/min), Macromastia, OAB (overactive bladder), PONV (postoperative nausea and vomiting), Renal artery stenosis, SOB (shortness of breath) on exertion, and Stress incontinence.     Surgical History: has a past surgical history that includes Appendectomy (1983); Lymph node biopsy (Left, 2001); Cholecystectomy (2012); Laparoscopic tubal ligation (Bilateral, 1994); Abdominoplasty (2016); Knee arthroscopy (Right); Dental surgery (2008); Laparoscopy (Bilateral, 10/17/2016); Colonoscopy (2013); Laparoscopy (Right, 05/07/2018); Esophagogastroduodenoscopy (2013); Blepharoplasty (Bilateral, 12/15/2022); Head/Neck Lesion/Cyst Excision (Left, 12/15/2022); Blepharoplasty (Bilateral, 12/15/2022); Cardiac catheterization (N/A, 03/06/2023); transvaginal taping suspension (N/A, 06/03/2024); and Breast Reduction (Bilateral, 9/20/2024).     Family History: family history includes Colon cancer in her maternal grandfather.     Social History: reports that she quit smoking about 7 months ago. Her smoking use included cigarettes. She started smoking about 39 years ago. She has a 58.9 pack-year smoking history. She has never used smokeless tobacco. She reports that she does not currently use alcohol. She reports that she does not use drugs.    Waleska Shah presents to [facility]    History of Present Illness    History of Present Illness  The patient presents for evaluation of cough.    She reports a persistent cough that disrupts her sleep and causes shortness of breath. Accompanying symptoms include diarrhea and headaches. Despite taking Tessalon Perles, her cough remains unrelieved. She has been  "experiencing intermittent fevers and suspects a sinus infection. Her cough is productive, yielding a significant amount of phlegm. She underwent surgery in 12/2023 and has a follow-up appointment scheduled for 12/2024. She also mentions a sensation of heaviness on her chest and shortness of breath during physical activity.    SOCIAL HISTORY  She stopped smoking in 04/2024.      Objective   Vital Signs:   /88   Pulse 81   Temp 97.1 °F (36.2 °C) (Infrared)   Ht 167.6 cm (66\")   Wt 73 kg (161 lb)   SpO2 99%   BMI 25.99 kg/m²       Wt Readings from Last 3 Encounters:   11/20/24 73 kg (161 lb)   10/18/24 74.8 kg (165 lb)   09/20/24 73.8 kg (162 lb 11.2 oz)        BP Readings from Last 3 Encounters:   11/20/24 132/88   10/18/24 142/74   09/20/24 134/86                Physical Exam  Vitals reviewed.   Constitutional:       Appearance: Normal appearance. She is well-developed.   HENT:      Head: Normocephalic and atraumatic.   Eyes:      Conjunctiva/sclera: Conjunctivae normal.      Pupils: Pupils are equal, round, and reactive to light.   Cardiovascular:      Rate and Rhythm: Normal rate and regular rhythm.      Heart sounds: No murmur heard.  Pulmonary:      Effort: Pulmonary effort is normal.      Breath sounds: Normal breath sounds. No wheezing.   Skin:     General: Skin is warm and dry.   Neurological:      Mental Status: She is alert and oriented to person, place, and time.   Psychiatric:         Mood and Affect: Mood and affect normal.         Behavior: Behavior normal.         Thought Content: Thought content normal.         Judgment: Judgment normal.        Result Review :  {The following data was reviewed by LASHANDA Urban on 11/20/2024.  Common labs          5/17/2024    09:35 6/13/2024    09:22 8/29/2024    10:31   Common Labs   Glucose 102   98    BUN 17   16    Creatinine 0.79   1.07    Sodium 141   141    Potassium 4.5   5.0    Chloride 105   106    Calcium 10.0   10.1    WBC 6.89  " 6.62  6.17    Hemoglobin 13.3  14.1  12.9    Hematocrit 39.4  41.0  38.9    Platelets 292  326  317    Uric Acid  6.9       Data reviewed : previous office note             Current Outpatient Medications on File Prior to Visit   Medication Sig Dispense Refill    allopurinol (ZYLOPRIM) 300 MG tablet Take 1 tablet by mouth Daily. 90 tablet 1    benzonatate (TESSALON) 200 MG capsule Take 1 capsule by mouth 3 (Three) Times a Day As Needed for Cough. 60 capsule 1    clonazePAM (KlonoPIN) 0.5 MG tablet Take 0.5-1 tablets by mouth 2 (Two) Times a Day As Needed.      Cyanocobalamin (B-12 PO) Take 1 tablet by mouth Daily. PT HOLDING FOR SURGERY      Diclofenac Sodium (VOLTAREN) 1 % gel gel Apply 4 g topically to the appropriate area as directed 4 (Four) Times a Day As Needed (pain). (Patient taking differently: Apply 4 g topically to the appropriate area as directed 4 (Four) Times a Day As Needed (pain). TO HOLD 1 WEEK BEFORE SURGERY) 100 g 1    Diclofenac Sodium (VOLTAREN) 1 % gel gel Apply 4 g topically to the appropriate area as directed 4 (Four) Times a Day. 100 g 5    DULoxetine (CYMBALTA) 20 MG capsule Take 1 capsule by mouth Every Night. 90 capsule 1    estradiol (ESTRACE) 0.1 MG/GM vaginal cream 1 (One) Time Per Week. WEDNESDAYS      Evolocumab (REPATHA) solution auto-injector SureClick injection Inject 1 mL under the skin into the appropriate area as directed Every 14 (Fourteen) Days. 6 mL 1    ezetimibe (Zetia) 10 MG tablet Take 1 tablet by mouth Daily. 90 tablet 1    fluticasone (FLONASE) 50 MCG/ACT nasal spray 2 sprays into the nostril(s) as directed by provider Daily. (Patient taking differently: Administer 2 sprays into the nostril(s) as directed by provider As Needed.) 16 g 1    guaiFENesin 200 MG tablet Take 2 tablets by mouth Every 4 (Four) Hours As Needed for Congestion or Cough. 60 tablet 1    HYDROcodone-acetaminophen (NORCO) 5-325 MG per tablet Take 1 tablet by mouth Every 4 (Four) Hours As Needed for  Moderate Pain (Pain) for up to 28 doses. 28 tablet 0    indomethacin (INDOCIN) 25 MG capsule Take 1-2 capsules by mouth 2 (Two) Times a Day With Meals. (Patient taking differently: Take 1-2 capsules by mouth 2 (Two) Times a Day As Needed. TO HOLD 1 WEEK BEFORE SURGERY) 60 capsule 2    lidocaine (Lidoderm) 5 % Place 1 patch on the skin as directed by provider Daily. Remove & Discard patch within 12 hours or as directed by MD (Patient taking differently: Place 1 patch on the skin as directed by provider 2 (Two) Times a Day As Needed. Remove & Discard patch within 12 hours or as directed by MD) 30 patch 3    vitamin D (ERGOCALCIFEROL) 1.25 MG (95101 UT) capsule capsule Take 1 capsule by mouth Every 7 (Seven) Days. WEDNESDAYS 13 capsule 1     No current facility-administered medications on file prior to visit.        Assessment and Plan  Diagnoses and all orders for this visit:    1. Follow-up exam (Primary)    2. Subacute cough  -     Hydrocod Yoan-Chlorphe Yoan ER (TUSSIONEX PENNKINETIC) 10-8 MG/5ML ER suspension; Take 5 mL by mouth Every 12 (Twelve) Hours As Needed for Cough for up to 10 days.  Dispense: 120 mL; Refill: 0    3. Diarrhea, unspecified type X one episode        Assessment & Plan  1. Cough.  Her COVID-19 test returned negative results. She reports a persistent cough that keeps her up at night and makes her short of breath. She has been taking Tessalon perles, which have not been effective. A prescription for Bromfed cough syrup will be sent to her pharmacy, [facility] in [facility] . She is advised to use a pillow to support her chest when coughing at home. If the symptoms do not improve, she is instructed to call for further management.    2. Sinus Infection.  She reports symptoms consistent with a sinus infection, including headache and ear discomfort. Examination of her ears and other symptoms suggest a sinus infection. Treatment for a sinus infection will be initiated.    3. Diarrhea.  She reports the  onset of diarrhea today. Monitoring of symptoms is recommended, and if the condition worsens, further evaluation will be necessary.    4. Medication Management.  She was previously prescribed Repatha 420 mg once a month, but the dosage has been changed to 140 mg every 2 weeks due to insurance coverage issues. She has not started the new dosage yet. She is advised to start taking Repatha 140 mg every 2 weeks as prescribed.        Follow Up   Return in about 1 month (around 12/20/2024) for Recheck.  Patient was given instructions and counseling regarding her condition or for health maintenance advice. Please see specific information pulled into the AVS if appropriate.       Part of this note may be electronic transcription/translation of spoken language to printed text using the Dragon dictation system    Patient or patient representative verbalized consent for the use of Ambient Listening during the visit with  LASHANDA Urban for chart documentation. 11/20/2024  16:59 EST

## 2024-12-04 ENCOUNTER — OFFICE VISIT (OUTPATIENT)
Dept: FAMILY MEDICINE CLINIC | Facility: CLINIC | Age: 59
End: 2024-12-04
Payer: OTHER GOVERNMENT

## 2024-12-04 VITALS
BODY MASS INDEX: 26.34 KG/M2 | WEIGHT: 163.9 LBS | TEMPERATURE: 98 F | SYSTOLIC BLOOD PRESSURE: 136 MMHG | HEART RATE: 76 BPM | OXYGEN SATURATION: 100 % | HEIGHT: 66 IN | DIASTOLIC BLOOD PRESSURE: 88 MMHG

## 2024-12-04 DIAGNOSIS — I25.10 CORONARY ARTERY CALCIFICATION SEEN ON CT SCAN: ICD-10-CM

## 2024-12-04 DIAGNOSIS — E78.2 MIXED HYPERLIPIDEMIA: ICD-10-CM

## 2024-12-04 DIAGNOSIS — J40 BRONCHITIS: Primary | ICD-10-CM

## 2024-12-04 DIAGNOSIS — R05.3 PERSISTENT COUGH: ICD-10-CM

## 2024-12-04 PROCEDURE — 99214 OFFICE O/P EST MOD 30 MIN: CPT | Performed by: NURSE PRACTITIONER

## 2024-12-04 RX ORDER — PREDNISONE 50 MG/1
50 TABLET ORAL DAILY
Qty: 5 TABLET | Refills: 0 | Status: SHIPPED | OUTPATIENT
Start: 2024-12-04

## 2024-12-04 RX ORDER — LEVOFLOXACIN 500 MG/1
500 TABLET, FILM COATED ORAL DAILY
Qty: 10 TABLET | Refills: 1 | Status: SHIPPED | OUTPATIENT
Start: 2024-12-04

## 2024-12-04 RX ORDER — CODEINE PHOSPHATE AND GUAIFENESIN 10; 100 MG/5ML; MG/5ML
5 SOLUTION ORAL 3 TIMES DAILY PRN
Qty: 120 ML | Refills: 1 | Status: SHIPPED | OUTPATIENT
Start: 2024-12-04

## 2024-12-06 ENCOUNTER — TRANSCRIBE ORDERS (OUTPATIENT)
Dept: ADMINISTRATIVE | Facility: HOSPITAL | Age: 59
End: 2024-12-06
Payer: OTHER GOVERNMENT

## 2024-12-06 DIAGNOSIS — J44.9 CHRONIC OBSTRUCTIVE PULMONARY DISEASE, UNSPECIFIED COPD TYPE: Primary | ICD-10-CM

## 2024-12-06 DIAGNOSIS — R05.3 CHRONIC COUGH: Primary | ICD-10-CM

## 2024-12-06 NOTE — PROGRESS NOTES
Chief Complaint  Cough (F/U still has cough .) and Shortness of Breath    Subjective        Medical History: has a past medical history of Acid reflux, Anxiety, Arthritis, Depression, Disease of thyroid gland, Gout attack, Hypertension, Hypothyroidism, Kidney disease, chronic, stage II (GFR 60-89 ml/min), Macromastia, OAB (overactive bladder), PONV (postoperative nausea and vomiting), Renal artery stenosis, SOB (shortness of breath) on exertion, and Stress incontinence.     Surgical History: has a past surgical history that includes Appendectomy (1983); Lymph node biopsy (Left, 2001); Cholecystectomy (2012); Laparoscopic tubal ligation (Bilateral, 1994); Abdominoplasty (2016); Knee arthroscopy (Right); Dental surgery (2008); Laparoscopy (Bilateral, 10/17/2016); Colonoscopy (2013); Laparoscopy (Right, 05/07/2018); Esophagogastroduodenoscopy (2013); Blepharoplasty (Bilateral, 12/15/2022); Head/Neck Lesion/Cyst Excision (Left, 12/15/2022); Blepharoplasty (Bilateral, 12/15/2022); Cardiac catheterization (N/A, 03/06/2023); transvaginal taping suspension (N/A, 06/03/2024); and Breast Reduction (Bilateral, 9/20/2024).     Family History: family history includes Colon cancer in her maternal grandfather.     Social History: reports that she quit smoking about 7 months ago. Her smoking use included cigarettes. She started smoking about 39 years ago. She has a 58.9 pack-year smoking history. She has never used smokeless tobacco. She reports that she does not currently use alcohol. She reports that she does not use drugs.    Waleska Shah presents to [facility]    History of Present Illness    History of Present Illness  The patient presents for evaluation of a persistent cough.    She has been experiencing this cough for nearly a month, which produces a clear, slimy substance. The cough is accompanied by shortness of breath during physical activity. She reports feeling cold, experiencing  "chills, and a sensation of tightness and swelling on her side. Additionally, she mentions a craving for air. She has attempted to manage the cough with over-the-counter medications, including Mucinex and a cough syrup prescribed by her nurse practitioner. She has not taken any antibiotics.     She has an inhaler but prefers not to use it, opting instead for a nebulizer. She has a scheduled appointment with a pulmonologist tomorrow. She is unsure of the date of her last chest x-ray.    She has a history of smoking but has since quit. She also reports excessive coffee consumption and acknowledges the need to reduce her intake.    She has a colonoscopy scheduled for 07/2025 and a cardiology appointment on 01/09/2025.  She needs a new referral to cardiology    She had a sleep apnea test done and was told that she needs a sleep mask.    SOCIAL HISTORY  She does not drink alcohol.    ALLERGIES  She is allergic to PERCOCET, which makes her itch after the second dose.      Objective   Vital Signs:   /88   Pulse 76   Temp 98 °F (36.7 °C)   Ht 167.6 cm (66\")   Wt 74.3 kg (163 lb 14.4 oz)   SpO2 100%   BMI 26.45 kg/m²       Wt Readings from Last 3 Encounters:   12/04/24 74.3 kg (163 lb 14.4 oz)   11/20/24 73 kg (161 lb)   10/18/24 74.8 kg (165 lb)        BP Readings from Last 3 Encounters:   12/04/24 136/88   11/20/24 132/88   10/18/24 142/74                Physical Exam  Vitals reviewed.   Constitutional:       General: She is not in acute distress.     Appearance: Normal appearance. She is well-developed. She is not ill-appearing.   HENT:      Head: Normocephalic and atraumatic.   Eyes:      Conjunctiva/sclera: Conjunctivae normal.      Pupils: Pupils are equal, round, and reactive to light.   Cardiovascular:      Rate and Rhythm: Normal rate and regular rhythm.      Heart sounds: No murmur heard.  Pulmonary:      Effort: Pulmonary effort is normal.      Breath sounds: Normal breath sounds. No wheezing.   Skin:   "   General: Skin is warm and dry.   Neurological:      Mental Status: She is alert and oriented to person, place, and time.   Psychiatric:         Mood and Affect: Mood and affect normal.         Behavior: Behavior normal.         Thought Content: Thought content normal.         Judgment: Judgment normal.        Result Review :  {The following data was reviewed by LASHANDA Urban on 12/04/2024.  Common labs          5/17/2024    09:35 6/13/2024    09:22 8/29/2024    10:31   Common Labs   Glucose 102   98    BUN 17   16    Creatinine 0.79   1.07    Sodium 141   141    Potassium 4.5   5.0    Chloride 105   106    Calcium 10.0   10.1    WBC 6.89  6.62  6.17    Hemoglobin 13.3  14.1  12.9    Hematocrit 39.4  41.0  38.9    Platelets 292  326  317    Uric Acid  6.9       Data reviewed : Previous office note             Current Outpatient Medications on File Prior to Visit   Medication Sig Dispense Refill    allopurinol (ZYLOPRIM) 300 MG tablet Take 1 tablet by mouth Daily. 90 tablet 1    clonazePAM (KlonoPIN) 0.5 MG tablet Take 0.5-1 tablets by mouth 2 (Two) Times a Day As Needed.      Cyanocobalamin (B-12 PO) Take 1 tablet by mouth Daily. PT HOLDING FOR SURGERY      Diclofenac Sodium (VOLTAREN) 1 % gel gel Apply 4 g topically to the appropriate area as directed 4 (Four) Times a Day As Needed (pain). (Patient taking differently: Apply 4 g topically to the appropriate area as directed 4 (Four) Times a Day As Needed (pain). TO HOLD 1 WEEK BEFORE SURGERY) 100 g 1    Diclofenac Sodium (VOLTAREN) 1 % gel gel Apply 4 g topically to the appropriate area as directed 4 (Four) Times a Day. 100 g 5    DULoxetine (CYMBALTA) 20 MG capsule Take 1 capsule by mouth Every Night. 90 capsule 1    estradiol (ESTRACE) 0.1 MG/GM vaginal cream 1 (One) Time Per Week. WEDNESDAYS      Evolocumab (REPATHA) solution auto-injector SureClick injection Inject 1 mL under the skin into the appropriate area as directed Every 14 (Fourteen) Days.  6 mL 1    ezetimibe (Zetia) 10 MG tablet Take 1 tablet by mouth Daily. 90 tablet 1    fluticasone (FLONASE) 50 MCG/ACT nasal spray 2 sprays into the nostril(s) as directed by provider Daily. (Patient taking differently: Administer 2 sprays into the nostril(s) as directed by provider As Needed.) 16 g 1    guaiFENesin 200 MG tablet Take 2 tablets by mouth Every 4 (Four) Hours As Needed for Congestion or Cough. 60 tablet 1    HYDROcodone-acetaminophen (NORCO) 5-325 MG per tablet Take 1 tablet by mouth Every 4 (Four) Hours As Needed for Moderate Pain (Pain) for up to 28 doses. 28 tablet 0    indomethacin (INDOCIN) 25 MG capsule Take 1-2 capsules by mouth 2 (Two) Times a Day With Meals. (Patient taking differently: Take 1-2 capsules by mouth 2 (Two) Times a Day As Needed. TO HOLD 1 WEEK BEFORE SURGERY) 60 capsule 2    lidocaine (Lidoderm) 5 % Place 1 patch on the skin as directed by provider Daily. Remove & Discard patch within 12 hours or as directed by MD (Patient taking differently: Place 1 patch on the skin as directed by provider 2 (Two) Times a Day As Needed. Remove & Discard patch within 12 hours or as directed by MD) 30 patch 3    vitamin D (ERGOCALCIFEROL) 1.25 MG (16489 UT) capsule capsule Take 1 capsule by mouth Every 7 (Seven) Days. WEDNESDAYS 13 capsule 1     No current facility-administered medications on file prior to visit.        Assessment and Plan  Diagnoses and all orders for this visit:    1. Bronchitis (Primary)  -     guaiFENesin-codeine (ROMILAR-AC) 100-10 MG/5ML solution/syrup; Take 5 mL by mouth 3 (Three) Times a Day As Needed for Cough.  Dispense: 120 mL; Refill: 1  -     XR Chest PA & Lateral; Future    2. Mixed hyperlipidemia  -     Ambulatory Referral to Cardiology    3. Coronary artery calcification seen on CT scan  -     Ambulatory Referral to Cardiology    4. Persistent cough    Other orders  -     levoFLOXacin (LEVAQUIN) 500 MG tablet; Take 1 tablet by mouth Daily.  Dispense: 10 tablet;  Refill: 1  -     predniSONE (DELTASONE) 50 MG tablet; Take 1 tablet by mouth Daily.  Dispense: 5 tablet; Refill: 0        Assessment & Plan  1. Persistent Cough.  She has been experiencing a persistent cough with green sputum production for almost a month, raising concerns for potential pneumonia. She reports shortness of breath and chills. A prescription for hydrocodone cough medicine and a 5-day course of steroids has been provided. She has been advised to refrain from taking any medication prior to her pulmonology appointment tomorrow. Antibiotics have been prescribed to address the potential pneumonia. If symptoms do not improve, a chest x-ray will be ordered to further investigate the cause of the cough.    2. Sleep Apnea.  She reports having a sleep apnea test done and is awaiting further evaluation from her pulmonologist. She has been advised to follow up with her pulmonologist to discuss the results and potential need for a sleep mask.    3. Smoking Cessation.  She has successfully stopped smoking, which is a positive step towards improving her respiratory health. Continued encouragement and support for smoking cessation will be provided.    4. Health Maintenance.  She has an upcoming colonoscopy appointment in July and a cardiology appointment scheduled for January 9th at 9 AM. She is advised to follow up with these appointments for routine health maintenance.          Follow Up   Return for If symptoms do not improve new concerning symptoms.  Patient was given instructions and counseling regarding her condition or for health maintenance advice. Please see specific information pulled into the AVS if appropriate.       Part of this note may be electronic transcription/translation of spoken language to printed text using the Dragon dictation system    Patient or patient representative verbalized consent for the use of Ambient Listening during the visit with  LASHANDA Urban for chart documentation.  12/6/2024  15:35 EST

## 2025-01-28 ENCOUNTER — TELEPHONE (OUTPATIENT)
Dept: FAMILY MEDICINE CLINIC | Facility: CLINIC | Age: 60
End: 2025-01-28

## 2025-01-28 ENCOUNTER — APPOINTMENT (OUTPATIENT)
Dept: CT IMAGING | Facility: HOSPITAL | Age: 60
End: 2025-01-28
Payer: OTHER GOVERNMENT

## 2025-01-28 ENCOUNTER — LAB (OUTPATIENT)
Dept: LAB | Facility: HOSPITAL | Age: 60
End: 2025-01-28
Payer: OTHER GOVERNMENT

## 2025-01-28 ENCOUNTER — HOSPITAL ENCOUNTER (EMERGENCY)
Facility: HOSPITAL | Age: 60
Discharge: HOME | End: 2025-01-28
Attending: EMERGENCY MEDICINE | Admitting: EMERGENCY MEDICINE
Payer: OTHER GOVERNMENT

## 2025-01-28 ENCOUNTER — APPOINTMENT (OUTPATIENT)
Dept: GENERAL RADIOLOGY | Facility: HOSPITAL | Age: 60
End: 2025-01-28
Payer: OTHER GOVERNMENT

## 2025-01-28 ENCOUNTER — OFFICE VISIT (OUTPATIENT)
Dept: FAMILY MEDICINE CLINIC | Facility: CLINIC | Age: 60
End: 2025-01-28
Payer: OTHER GOVERNMENT

## 2025-01-28 ENCOUNTER — HOSPITAL ENCOUNTER (OUTPATIENT)
Dept: GENERAL RADIOLOGY | Facility: HOSPITAL | Age: 60
Discharge: HOME | End: 2025-01-28
Payer: OTHER GOVERNMENT

## 2025-01-28 ENCOUNTER — HOSPITAL ENCOUNTER (OUTPATIENT)
Dept: CT IMAGING | Facility: HOSPITAL | Age: 60
Discharge: HOME | End: 2025-01-28
Payer: OTHER GOVERNMENT

## 2025-01-28 VITALS
HEIGHT: 66 IN | OXYGEN SATURATION: 100 % | TEMPERATURE: 98 F | HEART RATE: 101 BPM | DIASTOLIC BLOOD PRESSURE: 82 MMHG | SYSTOLIC BLOOD PRESSURE: 136 MMHG | BODY MASS INDEX: 24.88 KG/M2 | WEIGHT: 154.8 LBS

## 2025-01-28 VITALS
OXYGEN SATURATION: 93 % | RESPIRATION RATE: 21 BRPM | DIASTOLIC BLOOD PRESSURE: 97 MMHG | WEIGHT: 155.65 LBS | HEIGHT: 66 IN | BODY MASS INDEX: 25.01 KG/M2 | SYSTOLIC BLOOD PRESSURE: 169 MMHG | TEMPERATURE: 97.7 F | HEART RATE: 78 BPM

## 2025-01-28 DIAGNOSIS — K76.0 HEPATIC STEATOSIS: ICD-10-CM

## 2025-01-28 DIAGNOSIS — N39.0 UTI (URINARY TRACT INFECTION), BACTERIAL: Primary | ICD-10-CM

## 2025-01-28 DIAGNOSIS — R11.2 NAUSEA AND VOMITING, UNSPECIFIED VOMITING TYPE: ICD-10-CM

## 2025-01-28 DIAGNOSIS — A49.9 UTI (URINARY TRACT INFECTION), BACTERIAL: Primary | ICD-10-CM

## 2025-01-28 DIAGNOSIS — R06.02 SHORTNESS OF BREATH: ICD-10-CM

## 2025-01-28 DIAGNOSIS — R10.11 RIGHT UPPER QUADRANT PAIN: Primary | ICD-10-CM

## 2025-01-28 DIAGNOSIS — N12 PYELONEPHRITIS: ICD-10-CM

## 2025-01-28 DIAGNOSIS — R10.9 RIGHT FLANK PAIN: ICD-10-CM

## 2025-01-28 DIAGNOSIS — R39.198 DIFFICULTY URINATING: ICD-10-CM

## 2025-01-28 DIAGNOSIS — J10.1 INFLUENZA A: ICD-10-CM

## 2025-01-28 DIAGNOSIS — N64.9 BREAST LESION: ICD-10-CM

## 2025-01-28 LAB
ALBUMIN SERPL-MCNC: 4.7 G/DL (ref 3.5–5.2)
ALBUMIN/GLOB SERPL: 1.3 G/DL
ALP SERPL-CCNC: 103 U/L (ref 39–117)
ALT SERPL W P-5'-P-CCNC: 27 U/L (ref 1–33)
ANION GAP SERPL CALCULATED.3IONS-SCNC: 17.3 MMOL/L (ref 5–15)
AST SERPL-CCNC: 29 U/L (ref 1–32)
BACTERIA UR QL AUTO: ABNORMAL /HPF
BASOPHILS # BLD AUTO: 0.01 10*3/MM3 (ref 0–0.2)
BASOPHILS NFR BLD AUTO: 0.2 % (ref 0–1.5)
BILIRUB SERPL-MCNC: 0.2 MG/DL (ref 0–1.2)
BILIRUB UR QL STRIP: ABNORMAL
BILIRUB UR QL STRIP: NEGATIVE
BUN SERPL-MCNC: 24 MG/DL (ref 6–20)
BUN/CREAT SERPL: 19.2 (ref 7–25)
CALCIUM SPEC-SCNC: 9.8 MG/DL (ref 8.6–10.5)
CHLORIDE SERPL-SCNC: 96 MMOL/L (ref 98–107)
CLARITY UR: ABNORMAL
CLARITY UR: CLEAR
CO2 SERPL-SCNC: 21.7 MMOL/L (ref 22–29)
COLOR UR: ABNORMAL
COLOR UR: ABNORMAL
CREAT SERPL-MCNC: 1.25 MG/DL (ref 0.57–1)
D-LACTATE SERPL-SCNC: 1.7 MMOL/L (ref 0.5–2)
DEPRECATED RDW RBC AUTO: 43.3 FL (ref 37–54)
EGFRCR SERPLBLD CKD-EPI 2021: 50.1 ML/MIN/1.73
EOSINOPHIL # BLD AUTO: 0 10*3/MM3 (ref 0–0.4)
EOSINOPHIL NFR BLD AUTO: 0 % (ref 0.3–6.2)
ERYTHROCYTE [DISTWIDTH] IN BLOOD BY AUTOMATED COUNT: 13.2 % (ref 12.3–15.4)
FLUAV SUBTYP SPEC NAA+PROBE: DETECTED
FLUBV RNA ISLT QL NAA+PROBE: NOT DETECTED
GEN 5 1HR TROPONIN T REFLEX: 8 NG/L
GLOBULIN UR ELPH-MCNC: 3.5 GM/DL
GLUCOSE SERPL-MCNC: 105 MG/DL (ref 65–99)
GLUCOSE UR STRIP-MCNC: NEGATIVE MG/DL
GLUCOSE UR STRIP-MCNC: NEGATIVE MG/DL
HCT VFR BLD AUTO: 44.4 % (ref 34–46.6)
HGB BLD-MCNC: 14.8 G/DL (ref 12–15.9)
HGB UR QL STRIP.AUTO: ABNORMAL
HGB UR QL STRIP.AUTO: ABNORMAL
HOLD SPECIMEN: NORMAL
HOLD SPECIMEN: NORMAL
HYALINE CASTS UR QL AUTO: ABNORMAL /LPF
IMM GRANULOCYTES # BLD AUTO: 0.02 10*3/MM3 (ref 0–0.05)
IMM GRANULOCYTES NFR BLD AUTO: 0.4 % (ref 0–0.5)
KETONES UR QL STRIP: ABNORMAL
KETONES UR QL STRIP: ABNORMAL
LEUKOCYTE ESTERASE UR QL STRIP.AUTO: ABNORMAL
LEUKOCYTE ESTERASE UR QL STRIP.AUTO: NEGATIVE
LIPASE SERPL-CCNC: 23 U/L (ref 13–60)
LYMPHOCYTES # BLD AUTO: 1.65 10*3/MM3 (ref 0.7–3.1)
LYMPHOCYTES NFR BLD AUTO: 32.3 % (ref 19.6–45.3)
MCH RBC QN AUTO: 29.8 PG (ref 26.6–33)
MCHC RBC AUTO-ENTMCNC: 33.3 G/DL (ref 31.5–35.7)
MCV RBC AUTO: 89.5 FL (ref 79–97)
MONOCYTES # BLD AUTO: 0.57 10*3/MM3 (ref 0.1–0.9)
MONOCYTES NFR BLD AUTO: 11.2 % (ref 5–12)
NEUTROPHILS NFR BLD AUTO: 2.86 10*3/MM3 (ref 1.7–7)
NEUTROPHILS NFR BLD AUTO: 55.9 % (ref 42.7–76)
NITRITE UR QL STRIP: NEGATIVE
NITRITE UR QL STRIP: NEGATIVE
NRBC BLD AUTO-RTO: 0 /100 WBC (ref 0–0.2)
PH UR STRIP.AUTO: 5.5 [PH] (ref 5–8)
PH UR STRIP.AUTO: <=5 [PH] (ref 5–8)
PLATELET # BLD AUTO: 261 10*3/MM3 (ref 140–450)
PMV BLD AUTO: 10.2 FL (ref 6–12)
POTASSIUM SERPL-SCNC: 4.1 MMOL/L (ref 3.5–5.2)
PROT SERPL-MCNC: 8.2 G/DL (ref 6–8.5)
PROT UR QL STRIP: ABNORMAL
PROT UR QL STRIP: ABNORMAL
QT INTERVAL: 374 MS
QTC INTERVAL: 421 MS
RBC # BLD AUTO: 4.96 10*6/MM3 (ref 3.77–5.28)
RBC # UR STRIP: ABNORMAL /HPF
RBC CASTS #/AREA URNS LPF: ABNORMAL /LPF
REF LAB TEST METHOD: ABNORMAL
RSV RNA NPH QL NAA+NON-PROBE: NOT DETECTED
SARS-COV-2 RNA RESP QL NAA+PROBE: NOT DETECTED
SODIUM SERPL-SCNC: 135 MMOL/L (ref 136–145)
SP GR UR STRIP: 1.02 (ref 1–1.03)
SP GR UR STRIP: 1.03 (ref 1–1.03)
SQUAMOUS #/AREA URNS HPF: ABNORMAL /HPF
TROPONIN T NUMERIC DELTA: 0 NG/L
TROPONIN T SERPL HS-MCNC: 8 NG/L
UROBILINOGEN UR QL STRIP: ABNORMAL
UROBILINOGEN UR QL STRIP: ABNORMAL
WBC # UR STRIP: ABNORMAL /HPF
WBC NRBC COR # BLD AUTO: 5.11 10*3/MM3 (ref 3.4–10.8)
WHOLE BLOOD HOLD COAG: NORMAL
WHOLE BLOOD HOLD SPECIMEN: NORMAL

## 2025-01-28 PROCEDURE — 87086 URINE CULTURE/COLONY COUNT: CPT

## 2025-01-28 PROCEDURE — 93005 ELECTROCARDIOGRAM TRACING: CPT | Performed by: EMERGENCY MEDICINE

## 2025-01-28 PROCEDURE — 36415 COLL VENOUS BLD VENIPUNCTURE: CPT | Performed by: EMERGENCY MEDICINE

## 2025-01-28 PROCEDURE — 83690 ASSAY OF LIPASE: CPT | Performed by: EMERGENCY MEDICINE

## 2025-01-28 PROCEDURE — 74176 CT ABD & PELVIS W/O CONTRAST: CPT

## 2025-01-28 PROCEDURE — 81001 URINALYSIS AUTO W/SCOPE: CPT | Performed by: EMERGENCY MEDICINE

## 2025-01-28 PROCEDURE — 84484 ASSAY OF TROPONIN QUANT: CPT | Performed by: EMERGENCY MEDICINE

## 2025-01-28 PROCEDURE — 81001 URINALYSIS AUTO W/SCOPE: CPT | Performed by: FAMILY MEDICINE

## 2025-01-28 PROCEDURE — 99285 EMERGENCY DEPT VISIT HI MDM: CPT

## 2025-01-28 PROCEDURE — 99214 OFFICE O/P EST MOD 30 MIN: CPT | Performed by: FAMILY MEDICINE

## 2025-01-28 PROCEDURE — 96374 THER/PROPH/DIAG INJ IV PUSH: CPT

## 2025-01-28 PROCEDURE — 96375 TX/PRO/DX INJ NEW DRUG ADDON: CPT

## 2025-01-28 PROCEDURE — 96361 HYDRATE IV INFUSION ADD-ON: CPT

## 2025-01-28 PROCEDURE — 80053 COMPREHEN METABOLIC PANEL: CPT | Performed by: EMERGENCY MEDICINE

## 2025-01-28 PROCEDURE — 25510000001 IOPAMIDOL PER 1 ML: Performed by: EMERGENCY MEDICINE

## 2025-01-28 PROCEDURE — 85025 COMPLETE CBC W/AUTO DIFF WBC: CPT | Performed by: EMERGENCY MEDICINE

## 2025-01-28 PROCEDURE — 71045 X-RAY EXAM CHEST 1 VIEW: CPT

## 2025-01-28 PROCEDURE — 25810000003 SODIUM CHLORIDE 0.9 % SOLUTION

## 2025-01-28 PROCEDURE — 25010000002 KETOROLAC TROMETHAMINE PER 15 MG

## 2025-01-28 PROCEDURE — 83605 ASSAY OF LACTIC ACID: CPT | Performed by: EMERGENCY MEDICINE

## 2025-01-28 PROCEDURE — 71046 X-RAY EXAM CHEST 2 VIEWS: CPT

## 2025-01-28 PROCEDURE — 25010000002 CEFTRIAXONE PER 250 MG

## 2025-01-28 PROCEDURE — 87637 SARSCOV2&INF A&B&RSV AMP PRB: CPT | Performed by: EMERGENCY MEDICINE

## 2025-01-28 PROCEDURE — 87086 URINE CULTURE/COLONY COUNT: CPT | Performed by: FAMILY MEDICINE

## 2025-01-28 PROCEDURE — 93005 ELECTROCARDIOGRAM TRACING: CPT

## 2025-01-28 PROCEDURE — 74177 CT ABD & PELVIS W/CONTRAST: CPT

## 2025-01-28 RX ORDER — OSELTAMIVIR PHOSPHATE 30 MG/1
30 CAPSULE ORAL 2 TIMES DAILY
Qty: 10 CAPSULE | Refills: 0 | Status: SHIPPED | OUTPATIENT
Start: 2025-01-28 | End: 2025-02-02

## 2025-01-28 RX ORDER — CEPHALEXIN 500 MG/1
1000 CAPSULE ORAL 3 TIMES DAILY
Qty: 42 CAPSULE | Refills: 0 | Status: SHIPPED | OUTPATIENT
Start: 2025-01-28 | End: 2025-02-04

## 2025-01-28 RX ORDER — KETOROLAC TROMETHAMINE 15 MG/ML
15 INJECTION, SOLUTION INTRAMUSCULAR; INTRAVENOUS ONCE
Status: COMPLETED | OUTPATIENT
Start: 2025-01-28 | End: 2025-01-28

## 2025-01-28 RX ORDER — ONDANSETRON 4 MG/1
4 TABLET, ORALLY DISINTEGRATING ORAL EVERY 6 HOURS PRN
Status: SHIPPED | OUTPATIENT
Start: 2025-01-28

## 2025-01-28 RX ORDER — KETOROLAC TROMETHAMINE 10 MG/1
10 TABLET, FILM COATED ORAL EVERY 6 HOURS PRN
Qty: 15 TABLET | Refills: 0 | Status: SHIPPED | OUTPATIENT
Start: 2025-01-28

## 2025-01-28 RX ORDER — SODIUM CHLORIDE 0.9 % (FLUSH) 0.9 %
10 SYRINGE (ML) INJECTION AS NEEDED
Status: DISCONTINUED | OUTPATIENT
Start: 2025-01-28 | End: 2025-01-28 | Stop reason: HOSPADM

## 2025-01-28 RX ORDER — SCOPOLAMINE 1 MG/3D
1 PATCH, EXTENDED RELEASE TRANSDERMAL
Qty: 1 EACH | Refills: 0 | Status: SHIPPED | OUTPATIENT
Start: 2025-01-28

## 2025-01-28 RX ORDER — SCOPOLAMINE 1 MG/3D
1 PATCH, EXTENDED RELEASE TRANSDERMAL
Qty: 10 EACH | Refills: 0 | Status: SHIPPED | OUTPATIENT
Start: 2025-01-28

## 2025-01-28 RX ORDER — OSELTAMIVIR PHOSPHATE 75 MG/1
75 CAPSULE ORAL ONCE
Status: COMPLETED | OUTPATIENT
Start: 2025-01-28 | End: 2025-01-28

## 2025-01-28 RX ORDER — IOPAMIDOL 755 MG/ML
100 INJECTION, SOLUTION INTRAVASCULAR
Status: COMPLETED | OUTPATIENT
Start: 2025-01-28 | End: 2025-01-28

## 2025-01-28 RX ORDER — SCOPOLAMINE 1 MG/3D
1 PATCH, EXTENDED RELEASE TRANSDERMAL ONCE
Status: DISCONTINUED | OUTPATIENT
Start: 2025-01-28 | End: 2025-01-28 | Stop reason: HOSPADM

## 2025-01-28 RX ADMIN — SODIUM CHLORIDE 1000 ML: 9 INJECTION, SOLUTION INTRAVENOUS at 18:20

## 2025-01-28 RX ADMIN — SCOLOPAMINE TRANSDERMAL SYSTEM 1 PATCH: 1 PATCH, EXTENDED RELEASE TRANSDERMAL at 18:04

## 2025-01-28 RX ADMIN — OSELTAMIVIR PHOSPHATE 75 MG: 75 CAPSULE ORAL at 19:27

## 2025-01-28 RX ADMIN — SODIUM CHLORIDE 1000 MG: 9 INJECTION INTRAMUSCULAR; INTRAVENOUS; SUBCUTANEOUS at 18:20

## 2025-01-28 RX ADMIN — IOPAMIDOL 70 ML: 755 INJECTION, SOLUTION INTRAVENOUS at 17:52

## 2025-01-28 RX ADMIN — KETOROLAC TROMETHAMINE 15 MG: 15 INJECTION, SOLUTION INTRAMUSCULAR; INTRAVENOUS at 19:27

## 2025-01-28 NOTE — PROGRESS NOTES
Chief Complaint  Anorexia, Vomiting, Abdominal Pain (Right side), and Diarrhea    Subjective      Waleska Shah is a 59 y.o. female who presents to [facility]     History of Present Illness  The patient is a 59-year-old female who presents today for loss of appetite, vomiting, diarrhea, and right-sided pain. Symptoms started over the previous weekend with just nausea until today when she actually had vomiting. Patient reports that today was the first day she has not taken all of her medications due to the nausea with vomiting.    She began experiencing chills, loss of appetite, and right-sided pain on Saturday. She also reports cold feet, headaches, and vomiting, with the last episode occurring at 9:30. She suspects an underlying infection. She has not consumed any food today. She has discontinued all medications for the past 2 weeks due to her symptoms. She has a history of renal cysts and elevated uric acid levels but reports no history of kidney stones. She has a history of cholecystectomy. She has a history of urinary tract infections (UTIs) but reports no current urinary symptoms or dysuria. She occasionally experiences difficulty initiating urination and frequent urges to urinate without success. She reports no history of blood clots. She has a scheduled pulmonary function test and CT scan in 03/2025.    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 She reports shortness of breath and a mild cough prior to the onset of her current symptoms.    She has discontinued all medications for the past 2 weeks due to her symptoms. She is currently taking high-dose vitamin D supplements once weekly. She is not taking guaifenesin or Mucinex. She is not using estrogen cream. She is taking B12 supplements. She uses Voltaren gel as needed. She uses lidocaine patches as needed. She takes clonazepam for anxiety as  "needed.    MEDICATIONS  Current: vitamin D, B12, Voltaren gel, clonazepam  Discontinued: Indocin, hydrocodone, cough medicine with codeine, Levaquin, prednisone, Repatha, Zetia, estrogen cream, guaifenesin, Mucinex        Patient Care Team:  Erick Sandhu APRN as PCP - General (Nurse Practitioner)  Lara Bullock MD as Consulting Physician (Urology)  Lila Goldstein MD as Consulting Physician (Cardiology)    Objective   Vital Signs:   Vitals:    01/28/25 1052   BP: 136/82   Pulse: 101   Temp: 98 °F (36.7 °C)   SpO2: 100%   Weight: 70.2 kg (154 lb 12.8 oz)   Height: 167.6 cm (66\")     Body mass index is 24.99 kg/m².    Wt Readings from Last 3 Encounters:   01/28/25 70.2 kg (154 lb 12.8 oz)   12/04/24 74.3 kg (163 lb 14.4 oz)   11/20/24 73 kg (161 lb)     BP Readings from Last 3 Encounters:   01/28/25 136/82   12/04/24 136/88   11/20/24 132/88       Health Maintenance   Topic Date Due    Pneumococcal Vaccine 0-64 (1 of 2 - PCV) Never done    TDAP/TD VACCINES (1 - Tdap) Never done    ZOSTER VACCINE (1 of 2) Never done    COVID-19 Vaccine (3 - 2024-25 season) 09/01/2024    LUNG CANCER SCREENING  04/12/2025    MAMMOGRAM  03/05/2025 (Originally 5/4/2019)    INFLUENZA VACCINE  03/31/2025 (Originally 7/1/2024)    COLORECTAL CANCER SCREENING  06/18/2026 (Originally 1965)    ANNUAL PHYSICAL  03/13/2025    LIPID PANEL  03/13/2025    PAP SMEAR  03/04/2027    HEPATITIS C SCREENING  Completed       Lab Results (last 24 hours)       Procedure Component Value Units Date/Time    Urinalysis With Microscopic - Urine, Clean Catch [[id number]] Collected: 01/28/25 1235    Specimen: Urine, Clean Catch Updated: 01/28/25 1235    Narrative:      The following orders were created for panel order Urinalysis With Microscopic - Urine, Clean Catch.  Procedure                               Abnormality         Status                     ---------                               -----------         ------                   "   Urinalysis without micro...[[id number]]                      In process                 Urinalysis, Microscopic ...[[id number]]                      In process                   Please view results for these tests on the individual orders.    Urine Culture - Urine, Urine, Clean Catch [[id number]] Collected: 01/28/25 1235    Specimen: Urine, Clean Catch Updated: 01/28/25 1235    Urinalysis without microscopic (no culture) - Urine, Clean Catch [[id number]] Collected: 01/28/25 1235    Specimen: Urine, Clean Catch Updated: 01/28/25 1235    Urinalysis, Microscopic Only - Urine, Clean Catch [[id number]] Collected: 01/28/25 1235    Specimen: Urine, Clean Catch Updated: 01/28/25 1235               Physical Exam  Vitals and nursing note reviewed.   Constitutional:       Appearance: She is ill-appearing.   Cardiovascular:      Rate and Rhythm: Regular rhythm. Tachycardia present.      Heart sounds: Normal heart sounds.   Pulmonary:      Breath sounds: Wheezing present.      Comments: Diminished breath sounds bilateral bases, worse on Right side. Mild wheezing.    Abdominal:      General: Abdomen is flat.      Palpations: Abdomen is soft.      Tenderness: There is abdominal tenderness. There is right CVA tenderness and guarding (Fight upper quadrant and flank). There is no left CVA tenderness or rebound.      Comments: Hyperactive bowel sounds     Skin:     General: Skin is warm and dry.   Neurological:      Mental Status: She is alert.   Psychiatric:         Mood and Affect: Mood normal.         Behavior: Behavior normal.          Physical Exam  Decreased lung sounds on the right side.      Result Review   The following data was reviewed by: Tracey Fields MD on 01/28/2025:  [x]  Tests & Results  []  Hospitalization/Emergency Department/Urgent Care  []  Internal/External Consultant Notes    Results  Laboratory Studies  Previous labs reviewed from last year: Liver enzymes were normal. Elevated uric acid level.  CT Abdomen  Pelvis Stone Protocol    Result Date: 1/28/2025  CT ABDOMEN PELVIS STONE PROTOCOL Date of Exam: 1/28/2025 12:59 PM EST Indication: Right upper quadrant and flank pain with nausea/vomiting.. Comparison: CT of the abdomen and pelvis dated 9/16/2022 Technique: Axial CT images were obtained of the abdomen and pelvis without the administration of contrast. Reconstructed coronal and sagittal images were also obtained. Automated exposure control and iterative construction methods were used. Findings: Liver: The liver is unremarkable in morphology and decreased in attenuation. Evaluation for focal liver lesions is limited without IV contrast. No biliary dilation is seen. Gallbladder: Surgically absent. Pancreas: Unremarkable. Spleen: Unremarkable. Adrenal glands: Unremarkable. Genitourinary tract: Small right renal cyst. Kidneys are otherwise unremarkable. No hydronephrosis is seen. No urinary tract calculi are seen. The visualized portions of the ureters are unremarkable. Urinary bladder is decompressed which limits its evaluation. Pelvic organs are poorly evaluated due to lack of IV contrast administration Gastrointestinal tract: Limited evaluation of the hollow viscera due to lack of IV contrast administration. Colonic diverticulosis is present. Transverse colon is decompressed which further limits its evaluation. There is no evidence of bowel obstruction. Appendix: The appendix is not identified. Other findings: No free air or free fluid is identified. No pathologically enlarged lymph nodes are seen. Vascular calcifications are present. Bones and soft tissues: No acute or suspicious osseous or soft tissue lesion is identified. Lung bases: The visualized lung bases are clear. Coronary artery calcifications are seen.     Impression: Impression: 1.Examination is limited due to lack of IV contrast administration. 2.No acute abnormality identified within the abdomen or pelvis. 3.Hepatic steatosis. 4.Colonic diverticulosis.  5.Additional findings as detailed above. Electronically Signed: Fahad Mathew MD  1/28/2025 1:08 PM EST  Workstation ID: [id number]      Procedures          ASSESSMENT/PLAN  Diagnoses and all orders for this visit:    1. Right upper quadrant pain (Primary)  -     CT Abdomen Pelvis Stone Protocol  -     Cancel: Comprehensive metabolic panel; Future    2. Right flank pain  -     CT Abdomen Pelvis Stone Protocol  -     Cancel: POCT urinalysis dipstick, manual  -     Cancel: CBC & Differential; Future    3. Shortness of breath  -     XR Chest PA & Lateral    4. Nausea and vomiting, unspecified vomiting type  -     Scopolamine 1 MG/3DAYS patch; Place 1 patch on the skin as directed by provider Every 72 (Seventy-Two) Hours.  Dispense: 1 each; Refill: 0  -     ondansetron ODT (ZOFRAN-ODT) disintegrating tablet 4 mg  -     Cancel: CBC & Differential; Future    5. Difficulty urinating  -     Urinalysis With Microscopic - Urine, Clean Catch  -     Urine Culture - Urine, Urine, Clean Catch        Assessment & Plan  1. Loss of appetite, vomiting, diarrhea, and right-sided pain.  Her symptoms may be indicative of a severe pneumonia or a potential gallstone obstruction in the common duct, despite her cholecystectomy status. The possibility of a urinary tract infection progressing to a kidney infection can not be ruled out. Her heart rate is elevated, and she reports shortness of breath. She has not been compliant with her medication regimen for the past 2 weeks, which could potentially lead to withdrawal symptoms from Cymbalta. A chest x-ray and an abdominal CT scan will be ordered to further investigate her symptoms. A urine test will also be conducted to check for any signs of infection or kidney stones. A prescription for a scopolamine patch has been provided to manage her nausea. She is advised to resume her regular medication regimen, including Cymbalta.    2. Cough.  She reports a persistent cough, which may be related to  her pneumonia. She is advised to discontinue the use of guaifenesin and Mucinex.    3. Medication management.  She is currently taking high-dose vitamin D supplements once weekly. She is not taking guaifenesin or Mucinex. She is not using estrogen cream. She is taking B12 supplements. She uses Voltaren gel as needed. She uses lidocaine patches as needed. She takes clonazepam for anxiety as needed.        BMI is within normal parameters. No other follow-up for BMI required.       Waleska Shah  reports that she quit smoking about 9 months ago. Her smoking use included cigarettes. She started smoking about 40 years ago. She has a 58.9 pack-year smoking history. She has never used smokeless tobacco.              FOLLOW UP  Called patient after reviewing CXR that did not have significant change from prior CT scan and after getting CT abdomen results.  Patient status has progressed despite medication with worsening nausea/vomiting and abdominal pain.  She sounds in pain via the phone.  Counseled patient that since acute pancreatitis, hepatitis, or acute infectious gastroenteritis can not be ruled out.  Recommend further evaluation and management in ER given severity of symptoms.    Patient will go to ER for further evaluation and management.    Patient was given instructions and counseling regarding her condition or for health maintenance advice. Please see specific information pulled into the AVS if appropriate.       Tracey Fields MD  01/28/25  13:29 EST    Part of this note may be an electronic transcription/translation of spoken language to printed text using the Dragon Dictation System.    Patient or patient representative verbalized consent for the use of Ambient Listening during the visit with  Tracey Fields MD for chart documentation. 1/28/2025  13:32 EST

## 2025-01-28 NOTE — ED PROVIDER NOTES
"Time: 3:43 PM EST  Date of encounter:  1/28/2025  Independent Historian/Clinical History and Information was obtained by:   Patient    History is limited by: N/A    Chief Complaint: Flank pain      History of Present Illness:  Patient is a 59 y.o. year old female who presents to the emergency department for evaluation of right-sided flank pain and right lower quadrant abdominal pain that started Sunday.  Patient had a CT abdomen pelvis without contrast completed today.  It was negative.  Patient states she feels short of breath.  Admits to nausea and vomiting.  Denies dysuria and hematuria.  Denies history of kidney stones.  Denies cough. Reports chills.      Patient Care Team  Primary Care Provider: Erick Sandhu APRN    Past Medical History:     Allergies   Allergen Reactions    Amoxicillin Itching    Hydrocodone GI Intolerance     PT STATES SHE IS BOTHERED ONLY BY \"GENERIC\" HYDROCODONE.  ALSO GETS A HEADACHE AND BLURRED VISION WITH \"GENERIC\" HYDROCODONE    Ibuprofen Nausea And Vomiting    Percocet [Oxycodone-Acetaminophen] Itching and Rash    Tylenol With Codeine #3 [Acetaminophen-Codeine] Nausea And Vomiting and Other (See Comments)     Vision blurry    Statins Myalgia     Past Medical History:   Diagnosis Date    Acid reflux     no meds    Anxiety     Arthritis     rt knee    Depression     Disease of thyroid gland     low    Gout attack     RT TOE    Hypertension     PT STATES QUIT TAKING HTN MEDS \"LONG TIME AGO\"    Hypothyroidism     TOOK MEDS FOR 6 MONTHS    Kidney disease, chronic, stage II (GFR 60-89 ml/min)     Macromastia     OAB (overactive bladder)     PONV (postoperative nausea and vomiting)     STATES WOULD LIKE SCOP PATCH AM OF SURGERY    Renal artery stenosis     SOB (shortness of breath) on exertion     Stress incontinence     DAILY PAD     Past Surgical History:   Procedure Laterality Date    ABDOMINOPLASTY  2016    APPENDECTOMY  1983    BILATERAL BREAST REDUCTION Bilateral 9/20/2024    " Procedure: BILATERAL BREAST REDUCTION;  Surgeon: Waterhouse, Maurine, MD;  Location: [facility] OR;  Service: Plastics;  Laterality: Bilateral;    BLEPHAROPLASTY Bilateral 12/15/2022    Procedure: BILATERAL UPPER EYELID BLEPHAROPLASTY;  Surgeon: Royer Coyle MD;  Location: [facility] OR;  Service: Ophthalmology;  Laterality: Bilateral;    BLEPHAROPLASTY Bilateral 12/15/2022    Procedure: BILATERAL LOWER EYELID BLEPHAROPLASTY;  Surgeon: Royer Coyle MD;  Location: [facility] OR;  Service: Ophthalmology;  Laterality: Bilateral;  NEW IMAGE    CARDIAC CATHETERIZATION N/A 03/06/2023    Procedure: Left Heart Cath;  Surgeon: Bj Che MD;  Location: [facility] CATH INVASIVE LOCATION;  Service: Cardiovascular;  Laterality: N/A;    CHOLECYSTECTOMY  2012    COLONOSCOPY  2013    DENTAL PROCEDURE  2008    DENTAL IMPLANTS    DIAGNOSTIC LAPAROSCOPY Bilateral 10/17/2016    Procedure: LAPAROSCOPY W/ JAMAL SALPINGECTOMY LT SALPINGO-OOPHORECTOMY;  Surgeon: Bisi Donnelly MD;  Location: [facility];  Service:     DIAGNOSTIC LAPAROSCOPY Right 05/07/2018    Procedure: LAPAROSCOPIC RIGHT OVARIAN CYSTECTOMY LYSIS OF ADHESIONS LEFT ABDOMINAL WALL;  Surgeon: Bisi Donnelly MD;  Location: [facility] OR;  Service: Gynecology    ENDOSCOPY  2013    HEAD/NECK LESION/CYST EXCISION Left 12/15/2022    Procedure: LEFT EXCISION AND REPAIR OF BROW CYST;  Surgeon: Royer Coyle MD;  Location: [facility] OR;  Service: Ophthalmology;  Laterality: Left;    KNEE ARTHROSCOPY Right     X 3    LAPAROSCOPIC TUBAL LIGATION Bilateral 1994    LYMPH NODE BIOPSY Left 2001    GROIN    TRANSVAGINAL TAPING SUSPENSION N/A 06/03/2024    Procedure: TENSION FREE VAGINAL TAPING;  Surgeon: Julissa Robin MD;  Location: [facility] OR;  Service: Obstetrics/Gynecology;  Laterality: N/A;     Family History   Problem Relation Age of Onset    Colon cancer Maternal Grandfather         50'S    Malig Hyperthermia Neg Hx         Home Medications:  Prior to Admission medications    Medication Sig Start Date End Date Taking? Authorizing Provider   allopurinol (ZYLOPRIM) 300 MG tablet Take 1 tablet by mouth Daily. 8/19/24   Erick Sandhu APRN   clonazePAM (KlonoPIN) 0.5 MG tablet Take 0.5-1 tablets by mouth 2 (Two) Times a Day As Needed.  Patient taking differently: Take 0.5-1 tablets by mouth 2 (Two) Times a Day As Needed for Anxiety. 8/22/24   ProviderRebecca MD   Cyanocobalamin (B-12 PO) Take 1 tablet by mouth Daily. PT HOLDING FOR SURGERY    ProviderRebecca MD   Diclofenac Sodium (VOLTAREN) 1 % gel gel Apply 4 g topically to the appropriate area as directed 4 (Four) Times a Day. 8/21/24   Xavier Peterson DPM   DULoxetine (CYMBALTA) 20 MG capsule Take 1 capsule by mouth Every Night.  Patient taking differently: Take 1 capsule by mouth Every Night. 6/13/24   Erick Sandhu APRN   Evolocumab (REPATHA) solution auto-injector SureClick injection Inject 1 mL under the skin into the appropriate area as directed Every 14 (Fourteen) Days.  Patient not taking: Reported on 1/28/2025 10/18/24   Erick Sandhu APRN   ezetimibe (Zetia) 10 MG tablet Take 1 tablet by mouth Daily. 9/19/24   Erick Sandhu APRN   fluticasone (FLONASE) 50 MCG/ACT nasal spray 2 sprays into the nostril(s) as directed by provider Daily.  Patient not taking: Reported on 1/28/2025 6/13/24   Erikc Sandhu APRN   lidocaine (Lidoderm) 5 % Place 1 patch on the skin as directed by provider Daily. Remove & Discard patch within 12 hours or as directed by MD  Patient taking differently: Place 1 patch on the skin as directed by provider Daily. Remove & Discard patch within 12 hours or as directed by MD 6/13/24   Erick Sandhu APRN   Scopolamine 1 MG/3DAYS patch Place 1 patch on the skin as directed by provider Every 72 (Seventy-Two) Hours. 1/28/25   Tracey Fields MD   vitamin D (ERGOCALCIFEROL) 1.25 MG  (95549 UT) capsule capsule Take 1 capsule by mouth Every 7 (Seven) Days.    Erick Sandhu APRN   Diclofenac Sodium (VOLTAREN) 1 % gel gel Apply 4 g topically to the appropriate area as directed 4 (Four) Times a Day As Needed (pain).  Patient not taking: Reported on 2025  Erick Sandhu APRN   estradiol (ESTRACE) 0.1 MG/GM vaginal cream 1 (One) Time Per Week.   Patient not taking: Reported on 2025  ProviderRebecca MD   guaiFENesin 200 MG tablet Take 2 tablets by mouth Every 4 (Four) Hours As Needed for Congestion or Cough.  Patient not taking: Reported on 2025 10/18/24 1/28/25  Erick Sandhu APRN   guaiFENesin-codeine (ROMILAR-AC) 100-10 MG/5ML solution/syrup Take 5 mL by mouth 3 (Three) Times a Day As Needed for Cough.  Patient not taking: Reported on 2025  Erick Sandhu APRN   HYDROcodone-acetaminophen (NORCO) 5-325 MG per tablet Take 1 tablet by mouth Every 4 (Four) Hours As Needed for Moderate Pain (Pain) for up to 28 doses.  Patient not taking: Reported on 2025  Waterhouse, Maurine, MD   indomethacin (INDOCIN) 25 MG capsule Take 1-2 capsules by mouth 2 (Two) Times a Day With Meals.  Patient not taking: Reported on 2025  Erick Sandhu APRN   levoFLOXacin (LEVAQUIN) 500 MG tablet Take 1 tablet by mouth Daily.  Patient not taking: Reported on 2025  Erick Sandhu APRN   predniSONE (DELTASONE) 50 MG tablet Take 1 tablet by mouth Daily.  Patient not taking: Reported on 2025  Erick Sandhu APRN        Social History:   Social History     Tobacco Use    Smoking status: Former     Current packs/day: 0.00     Average packs/day: 1.5 packs/day for 39.3 years (58.9 ttl pk-yrs)     Types: Cigarettes     Start date:      Quit date: 2024     Years since quittin.8     "Smokeless tobacco: Never    Tobacco comments:     WAS SMOKING 2PPD QUIT APRIL 2024   Vaping Use    Vaping status: Never Used   Substance Use Topics    Alcohol use: Not Currently    Drug use: Never         Review of Systems:  Review of Systems   Constitutional:  Positive for chills. Negative for fever.   HENT:  Negative for ear pain.    Eyes:  Negative for pain.   Respiratory:  Positive for shortness of breath. Negative for cough.    Cardiovascular:  Negative for chest pain.   Gastrointestinal:  Positive for abdominal pain, nausea and vomiting. Negative for diarrhea.   Genitourinary:  Positive for flank pain. Negative for dysuria.   Musculoskeletal:  Negative for arthralgias.   Skin:  Negative for rash.   Neurological:  Positive for headaches.        Physical Exam:  /98   Pulse 88   Temp 97.7 °F (36.5 °C) (Oral)   Resp 20   Ht 167.6 cm (66\")   Wt 70.6 kg (155 lb 10.3 oz)   LMP 04/08/2018   SpO2 93%   BMI 25.12 kg/m²     Physical Exam  Vitals and nursing note reviewed.   Constitutional:       Appearance: Normal appearance.   HENT:      Head: Normocephalic and atraumatic.      Nose: Nose normal.      Mouth/Throat:      Mouth: Mucous membranes are moist.   Eyes:      Extraocular Movements: Extraocular movements intact.      Conjunctiva/sclera: Conjunctivae normal.      Pupils: Pupils are equal, round, and reactive to light.   Cardiovascular:      Rate and Rhythm: Normal rate and regular rhythm.      Heart sounds: Normal heart sounds.   Pulmonary:      Effort: Pulmonary effort is normal.      Breath sounds: Normal breath sounds. No wheezing.   Abdominal:      General: Abdomen is flat. Bowel sounds are normal. There is no distension.      Palpations: Abdomen is soft.      Tenderness: There is abdominal tenderness in the right upper quadrant and right lower quadrant. There is right CVA tenderness. There is no left CVA tenderness.   Musculoskeletal:         General: Normal range of motion.      Cervical back: " Normal range of motion and neck supple.   Skin:     General: Skin is warm and dry.   Neurological:      General: No focal deficit present.      Mental Status: She is alert and oriented to person, place, and time.   Psychiatric:         Mood and Affect: Mood normal.         Behavior: Behavior normal.         Thought Content: Thought content normal.         Judgment: Judgment normal.                    Medical Decision Making:      Comorbidities that affect care:    Thyroid gland disease, hypertension, CKD    External Notes reviewed:    Previous Clinic Note: Reviewed clinic note on 1/20/2025.      The following orders were placed and all results were independently analyzed by me:  Orders Placed This Encounter   Procedures    COVID PRE-OP / PRE-PROCEDURE SCREENING ORDER (NO ISOLATION) - Swab, Nasopharynx    COVID-19, FLU A/B, RSV PCR 1 HR TAT - Swab, Nasopharynx    Urine Culture - Urine,    XR Chest 1 View    CT Abdomen Pelvis With Contrast    [place] Draw    Comprehensive Metabolic Panel    Lipase    Urinalysis With Microscopic If Indicated (No Culture) - Urine, Clean Catch    Lactic Acid, Plasma    High Sensitivity Troponin T    CBC Auto Differential    High Sensitivity Troponin T 1Hr    Urinalysis, Microscopic Only - Urine, Clean Catch    NPO Diet NPO Type: Strict NPO    Undress & Gown    ECG 12 Lead Dyspnea    Insert Peripheral IV    CBC & Differential    Green Top (Gel)    Lavender Top    Gold Top - SST    Light Blue Top       Medications Given in the Emergency Department:  Medications   sodium chloride 0.9 % flush 10 mL (has no administration in time range)   scopolamine patch 1 mg/72 hr (1 patch Transdermal Medication Applied 1/28/25 1804)   iopamidol (ISOVUE-370) 76 % injection 100 mL (70 mL Intravenous Given 1/28/25 1752)   sodium chloride 0.9 % bolus 1,000 mL (1,000 mL Intravenous New Bag 1/28/25 1820)   cefTRIAXone (ROCEPHIN) in NS 1 gram/10ml IV PUSH syringe (1,000 mg Intravenous Given 1/28/25 1820)    ketorolac (TORADOL) injection 15 mg (15 mg Intravenous Given 1/28/25 1927)   oseltamivir (TAMIFLU) capsule 75 mg (75 mg Oral Given 1/28/25 1927)        ED Course:    ED Course as of 01/28/25 2003 Tue Jan 28, 2025   1544 --- PROVIDER IN TRIAGE NOTE ---    The patient was evaluated by Ale pittman in triage. Orders were placed and the patient is currently awaiting disposition.    [AJ]      ED Course User Index  [AJ] Ale Jeong PA-C       Labs:    Lab Results (last 24 hours)       Procedure Component Value Units Date/Time    Urinalysis With Microscopic - Urine, Clean Catch [[id number]] Collected: 01/28/25 1235    Specimen: Urine, Clean Catch Updated: 01/28/25 1235    Narrative:      The following orders were created for panel order Urinalysis With Microscopic - Urine, Clean Catch.  Procedure                               Abnormality         Status                     ---------                               -----------         ------                     Urinalysis without micro...[[id number]]                      In process                 Urinalysis, Microscopic ...[[id number]]                      In process                   Please view results for these tests on the individual orders.    Urine Culture - Urine, Urine, Clean Catch [[id number]] Collected: 01/28/25 1235    Specimen: Urine, Clean Catch Updated: 01/28/25 1235    Urinalysis without microscopic (no culture) - Urine, Clean Catch [[id number]] Collected: 01/28/25 1235    Specimen: Urine, Clean Catch Updated: 01/28/25 1235    Urinalysis, Microscopic Only - Urine, Clean Catch [[id number]] Collected: 01/28/25 1235    Specimen: Urine, Clean Catch Updated: 01/28/25 1235    CBC & Differential [[id number]]  (Abnormal) Collected: 01/28/25 1510    Specimen: Blood from Arm, Right Updated: 01/28/25 1523    Narrative:      The following orders were created for panel order CBC & Differential.  Procedure                               Abnormality          Status                     ---------                               -----------         ------                     CBC Auto Differential[[id number]]        Abnormal            Final result                 Please view results for these tests on the individual orders.    Comprehensive Metabolic Panel [[id number]]  (Abnormal) Collected: 01/28/25 1510    Specimen: Blood from Arm, Right Updated: 01/28/25 1541     Glucose 105 mg/dL      BUN 24 mg/dL      Creatinine 1.25 mg/dL      Sodium 135 mmol/L      Potassium 4.1 mmol/L      Chloride 96 mmol/L      CO2 21.7 mmol/L      Calcium 9.8 mg/dL      Total Protein 8.2 g/dL      Albumin 4.7 g/dL      ALT (SGPT) 27 U/L      AST (SGOT) 29 U/L      Alkaline Phosphatase 103 U/L      Total Bilirubin 0.2 mg/dL      Globulin 3.5 gm/dL      A/G Ratio 1.3 g/dL      BUN/Creatinine Ratio 19.2     Anion Gap 17.3 mmol/L      eGFR 50.1 mL/min/1.73     Narrative:      GFR Categories in Chronic Kidney Disease (CKD)      GFR Category          GFR (mL/min/1.73)    Interpretation  G1                     90 or greater         Normal or high (1)  G2                      60-89                Mild decrease (1)  G3a                   45-59                Mild to moderate decrease  G3b                   30-44                Moderate to severe decrease  G4                    15-29                Severe decrease  G5                    14 or less           Kidney failure          (1)In the absence of evidence of kidney disease, neither GFR category G1 or G2 fulfill the criteria for CKD.    eGFR calculation 2021 CKD-EPI creatinine equation, which does not include race as a factor    Lipase [[id number]]  (Normal) Collected: 01/28/25 1510    Specimen: Blood from Arm, Right Updated: 01/28/25 1541     Lipase 23 U/L     Lactic Acid, Plasma [[id number]]  (Normal) Collected: 01/28/25 1510    Specimen: Blood from Arm, Right Updated: 01/28/25 1536     Lactate 1.7 mmol/L     High Sensitivity Troponin T [[id number]]   (Normal) Collected: 01/28/25 1510    Specimen: Blood from Arm, Right Updated: 01/28/25 1541     HS Troponin T 8 ng/L     Narrative:      High Sensitive Troponin T Reference Range:  <14.0 ng/L- Negative Female for AMI  <22.0 ng/L- Negative Male for AMI  >=14 - Abnormal Female indicating possible myocardial injury.  >=22 - Abnormal Male indicating possible myocardial injury.   Clinicians would have to utilize clinical acumen, EKG, Troponin, and serial changes to determine if it is an Acute Myocardial Infarction or myocardial injury due to an underlying chronic condition.         CBC Auto Differential [[id number]]  (Abnormal) Collected: 01/28/25 1510    Specimen: Blood from Arm, Right Updated: 01/28/25 1523     WBC 5.11 10*3/mm3      RBC 4.96 10*6/mm3      Hemoglobin 14.8 g/dL      Hematocrit 44.4 %      MCV 89.5 fL      MCH 29.8 pg      MCHC 33.3 g/dL      RDW 13.2 %      RDW-SD 43.3 fl      MPV 10.2 fL      Platelets 261 10*3/mm3      Neutrophil % 55.9 %      Lymphocyte % 32.3 %      Monocyte % 11.2 %      Eosinophil % 0.0 %      Basophil % 0.2 %      Immature Grans % 0.4 %      Neutrophils, Absolute 2.86 10*3/mm3      Lymphocytes, Absolute 1.65 10*3/mm3      Monocytes, Absolute 0.57 10*3/mm3      Eosinophils, Absolute 0.00 10*3/mm3      Basophils, Absolute 0.01 10*3/mm3      Immature Grans, Absolute 0.02 10*3/mm3      nRBC 0.0 /100 WBC     Urinalysis With Microscopic If Indicated (No Culture) - Urine, Clean Catch [[id number]]  (Abnormal) Collected: 01/28/25 1616    Specimen: Urine, Clean Catch Updated: 01/28/25 1657     Color, UA Dark Yellow     Appearance, UA Cloudy     pH, UA <=5.0     Specific Gravity, UA 1.027     Glucose, UA Negative     Ketones, UA 40 mg/dL (2+)     Bilirubin, UA Moderate (2+)     Blood, UA Moderate (2+)     Protein,  mg/dL (2+)     Leuk Esterase, UA Trace     Nitrite, UA Negative     Urobilinogen, UA 1.0 E.U./dL    Urinalysis, Microscopic Only - Urine, Clean Catch [[id number]]  (Abnormal)  Collected: 01/28/25 1616    Specimen: Urine, Clean Catch Updated: 01/28/25 1705     RBC, UA 6-10 /HPF      WBC, UA 3-5 /HPF      Bacteria, UA 2+ /HPF      Squamous Epithelial Cells, UA 13-20 /HPF      Hyaline Casts, UA 3-6 /LPF      RBC Casts 0-2 /LPF      Methodology Automated Microscopy    Urine Culture - Urine, Urine, Clean Catch [[id number]] Collected: 01/28/25 1616    Specimen: Urine, Clean Catch Updated: 01/28/25 1807    COVID PRE-OP / PRE-PROCEDURE SCREENING ORDER (NO ISOLATION) - Swab, Nasopharynx [[id number]]  (Abnormal) Collected: 01/28/25 1656    Specimen: Swab from Nasopharynx Updated: 01/28/25 1742    Narrative:      The following orders were created for panel order COVID PRE-OP / PRE-PROCEDURE SCREENING ORDER (NO ISOLATION) - Swab, Nasopharynx.  Procedure                               Abnormality         Status                     ---------                               -----------         ------                     COVID-19, FLU A/B, RSV P...[[id number]]  Abnormal            Final result                 Please view results for these tests on the individual orders.    COVID-19, FLU A/B, RSV PCR 1 HR TAT - Swab, Nasopharynx [[id number]]  (Abnormal) Collected: 01/28/25 1656    Specimen: Swab from Nasopharynx Updated: 01/28/25 1742     COVID19 Not Detected     Influenza A PCR Detected     Influenza B PCR Not Detected     RSV, PCR Not Detected    Narrative:      Fact sheet for providers: https://www.fda.gov/media/768376/download    Fact sheet for patients: https://www.fda.gov/media/339203/download    Test performed by PCR.    High Sensitivity Troponin T 1Hr [[id number]]  (Normal) Collected: 01/28/25 1659    Specimen: Blood from Arm, Left Updated: 01/28/25 1724     HS Troponin T 8 ng/L      Troponin T Numeric Delta 0 ng/L     Narrative:      High Sensitive Troponin T Reference Range:  <14.0 ng/L- Negative Female for AMI  <22.0 ng/L- Negative Male for AMI  >=14 - Abnormal Female indicating possible myocardial  injury.  >=22 - Abnormal Male indicating possible myocardial injury.   Clinicians would have to utilize clinical acumen, EKG, Troponin, and serial changes to determine if it is an Acute Myocardial Infarction or myocardial injury due to an underlying chronic condition.                  Imaging:    CT Abdomen Pelvis With Contrast    Result Date: 1/28/2025  CT ABDOMEN PELVIS W CONTRAST Date of Exam: 1/28/2025 5:39 PM EST Indication: R abdominal pain. Comparison: CT abdomen pelvis without contrast dated 1/28/2025 Technique: Axial CT images were obtained of the abdomen and pelvis after the uneventful intravenous administration of iodinated contrast. Reconstructed coronal and sagittal images were also obtained. Automated exposure control and iterative construction methods were used. Findings: The lung bases are clear bilaterally. 0.8 cm nodular density is noted in the inferior right breast. There is surrounding irregular density suspected to be post surgical or possibly inflammatory in etiology. Diffuse hepatic steatosis is noted. No focal hepatic lesion is seen. A cholecystectomy has been performed. The spleen, pancreas and adrenal glands appear unremarkable. Several simple appearing bilateral renal cysts are noted. The kidneys otherwise appear  unremarkable. No ureteral stone is seen on either side. The urinary bladder appears unremarkable. The uterus appears unremarkable. The adnexal regions appear unremarkable. No acute bowel abnormality is identified. An appendectomy has been performed. The lack of oral contrast limits evaluation of the bowel. No focal osseous abnormality is seen.     Impression: 1.0.8 cm nodular density in the inferior right breast. Correlate with any previous mammography or ultrasound results. If this is unavailable, consider diagnostic mammogram and ultrasound to further evaluate. 2.Diffuse hepatic steatosis. 3.Previous cholecystectomy and appendectomy. 4.Multiple bilateral renal cysts  Electronically Signed: Vignesh Duenas MD  1/28/2025 6:39 PM EST  Workstation ID: [id number]    XR Chest 1 View    Result Date: 1/28/2025  XR CHEST 1 VW Date of Exam: 1/28/2025 4:03 PM EST Indication: sob Comparison: Chest x-ray January 28, 2025, CT chest April 12, 2024 Findings: There is a granuloma in the right lung. The heart is not enlarged. There is some atelectasis or scarring in the lingular area. There are no pleural effusions.     Impression: 1.Atelectasis or scarring lingular area. 2.Evidence for prior granulomatous exposure. Electronically Signed: Rojelio Dominique MD  1/28/2025 4:12 PM EST  Workstation ID: [id number]    XR Chest PA & Lateral    Result Date: 1/28/2025  XR CHEST PA AND LATERAL Date of Exam: 1/28/2025 1:08 PM EST Indication: shortness of breath with decreased air movement and pain on Right side. Comparison: Chest CT 4/12/2024 Findings: Heart size and pulmonary vasculature are within normal limits. No acute pulmonary abnormality. Calcified granuloma in the mid right lung. Scarring in the lingula. Costophrenic angle sharp     Impression: No active cardiopulmonary disease Electronically Signed: Madi Leong  1/28/2025 1:31 PM EST  Workstation ID: [id number]    CT Abdomen Pelvis Stone Protocol    Result Date: 1/28/2025  CT ABDOMEN PELVIS STONE PROTOCOL Date of Exam: 1/28/2025 12:59 PM EST Indication: Right upper quadrant and flank pain with nausea/vomiting.. Comparison: CT of the abdomen and pelvis dated 9/16/2022 Technique: Axial CT images were obtained of the abdomen and pelvis without the administration of contrast. Reconstructed coronal and sagittal images were also obtained. Automated exposure control and iterative construction methods were used. Findings: Liver: The liver is unremarkable in morphology and decreased in attenuation. Evaluation for focal liver lesions is limited without IV contrast. No biliary dilation is seen. Gallbladder: Surgically absent. Pancreas: Unremarkable. Spleen:  Unremarkable. Adrenal glands: Unremarkable. Genitourinary tract: Small right renal cyst. Kidneys are otherwise unremarkable. No hydronephrosis is seen. No urinary tract calculi are seen. The visualized portions of the ureters are unremarkable. Urinary bladder is decompressed which limits its evaluation. Pelvic organs are poorly evaluated due to lack of IV contrast administration Gastrointestinal tract: Limited evaluation of the hollow viscera due to lack of IV contrast administration. Colonic diverticulosis is present. Transverse colon is decompressed which further limits its evaluation. There is no evidence of bowel obstruction. Appendix: The appendix is not identified. Other findings: No free air or free fluid is identified. No pathologically enlarged lymph nodes are seen. Vascular calcifications are present. Bones and soft tissues: No acute or suspicious osseous or soft tissue lesion is identified. Lung bases: The visualized lung bases are clear. Coronary artery calcifications are seen.     Impression: 1.Examination is limited due to lack of IV contrast administration. 2.No acute abnormality identified within the abdomen or pelvis. 3.Hepatic steatosis. 4.Colonic diverticulosis. 5.Additional findings as detailed above. Electronically Signed: Fahad Mathew MD  1/28/2025 1:08 PM EST  Workstation ID: [id number]       Differential Diagnosis and Discussion:    Dyspnea: Differential diagnosis includes but is not limited to metabolic acidosis, neurological disorders, psychogenic, asthma, pneumothorax, upper airway obstruction, COPD, pneumonia, noncardiogenic pulmonary edema, interstitial lung disease, anemia, congestive heart failure, and pulmonary embolism    PROCEDURES:    Labs were collected in the emergency department and all labs were reviewed and interpreted by me.  X-ray were performed in the emergency department and all X-ray impressions were independently interpreted by me.  An EKG was performed and the EKG was  interpreted by supervising attending.  CT scan was performed in the emergency department and the CT scan radiology impression was interpreted by me.    ECG 12 Lead Dyspnea   Preliminary Result   HEART RATE=76  bpm   RR Hykltpot=028  ms   [place] Pnhtykzn=013  ms   P Horizontal Axis=15  deg   P Front Axis=48  deg   QRSD Interval=91  ms   QT Xwvgkwut=373  ms   SVnV=244  ms   QRS Axis=28  deg   T Wave Axis=54  deg   - NORMAL ECG -   Sinus rhythm   Date and Time of Study:2025-01-28 15:13:09          Procedures    MDM     Amount and/or Complexity of Data Reviewed  Clinical lab tests: reviewed  Tests in the radiology section of CPT®: reviewed  Decide to obtain previous medical records or to obtain history from someone other than the patient: yes    Risk of Complications, Morbidity, and/or Mortality  Presenting problems: moderate  Diagnostic procedures: moderate  Management options: moderate    Patient Progress  Patient progress: stable    Patient presents to the emergency department for evaluation of right-sided flank pain and right lower quadrant abdominal pain that started Sunday.  Patient had a CT abdomen pelvis without contrast completed today.  It was negative.  Patient states she feels short of breath.  Admits to nausea and vomiting.  Denies dysuria and hematuria.  Denies history of kidney stones.  Denies cough. Reports chills.    The patient´s CBC that was reviewed and interpreted by me shows no abnormalities of critical concern. Of note, there is no anemia requiring a blood transfusion and the platelet count is acceptable.     CMP is reviewed by me.  Sodium and potassium are within normal limits.  Creatinine is slightly elevated 1.25 with an EGFR 50.1.  Liver function is intact.  Bicarb is a little low at 21.7 with an anion gap of 17.3.    Patient tested positive to flu A.  COVID and flu B are negative.  RSV negative.    UA is positive for bacteria.    Troponin and delta troponin within normal limits.    CT Abdomen  Pelvis With Contrast   Final Result   Impression:   1.0.8 cm nodular density in the inferior right breast. Correlate with any previous mammography or ultrasound results. If this is unavailable, consider diagnostic mammogram and ultrasound to further evaluate.   2.Diffuse hepatic steatosis.   3.Previous cholecystectomy and appendectomy.   4.Multiple bilateral renal cysts            Electronically Signed: Vignesh Duenas MD     1/28/2025 6:39 PM EST     Workstation ID: [id number]      XR Chest 1 View   Final Result   Impression:   1.Atelectasis or scarring lingular area.   2.Evidence for prior granulomatous exposure.            Electronically Signed: Rojelio Dominique MD     1/28/2025 4:12 PM EST     Workstation ID: [id number]        Discussed imaging findings with the patient.  Discussed the 0.8 cm nodular density in the inferior right breast with the patient.  Patient states that she recently had a breast augmentation in September.  She has done well with the surgery.  On the breast exam today, there is a small round mobile lesion appreciated.  There is no cellulitis or mastitis appreciated.  Will send referral to OB/GYN for possible mammogram.    Started the patient on fluids, Rocephin, scopolamine patch.  Also started the patient on Tamiflu and Toradol.    Since patient is having flank pain, I am worried that she also has pyelonephritis.  Will discharge the patient with Keflex, Tamiflu, scopolamine patch, and Toradol.    Follow up with your Primary Care Provider in 3-5 days especially if symptoms worsen.                Patient Care Considerations:    SEPSIS was considered but is NOT present in the emergency department as SIRS criteria is not present.      Consultants/Shared Management Plan:    None    Social Determinants of Health:    Patient is independent, reliable, and has access to care.       Disposition and Care Coordination:    Discharged: The patient is suitable and stable for discharge with no need for consideration  of admission.    I have explained the patient´s condition, diagnoses and treatment plan based on the information available to me at this time. I have answered questions and addressed any concerns. The patient has a good  understanding of the patient´s diagnosis, condition, and treatment plan as can be expected at this point. The vital signs have been stable. The patient´s condition is stable and appropriate for discharge from the emergency department.      The patient will pursue further outpatient evaluation with the primary care physician or other designated or consulting physician as outlined in the discharge instructions. They are agreeable to this plan of care and follow-up instructions have been explained in detail. The patient has received these instructions in written format and has expressed an understanding of the discharge instructions. The patient is aware that any significant change in condition or worsening of symptoms should prompt an immediate return to this or the closest emergency department or call to 911.  I have explained discharge medications and the need for follow up with the patient/caretakers. This was also printed in the discharge instructions. Patient was discharged with the following medications and follow up:      Medication List        New Prescriptions      cephalexin 500 MG capsule  Commonly known as: KEFLEX  Take 2 capsules by mouth 3 (Three) Times a Day for 7 days.     ketorolac 10 MG tablet  Commonly known as: TORADOL  Take 1 tablet by mouth Every 6 (Six) Hours As Needed for Moderate Pain.     oseltamivir 30 MG capsule  Commonly known as: Tamiflu  Take 1 capsule by mouth 2 (Two) Times a Day for 5 days.            Changed      clonazePAM 0.5 MG tablet  Commonly known as: KlonoPIN  What changed: reasons to take this     * Scopolamine 1 MG/3DAYS patch  Place 1 patch on the skin as directed by provider Every 72 (Seventy-Two) Hours.  What changed: Another medication with the same name  was added. Make sure you understand how and when to take each.     * Scopolamine 1 MG/3DAYS patch  Place 1 patch on the skin as directed by provider Every 72 (Seventy-Two) Hours.  What changed: You were already taking a medication with the same name, and this prescription was added. Make sure you understand how and when to take each.           * This list has 2 medication(s) that are the same as other medications prescribed for you. Read the directions carefully, and ask your doctor or other care provider to review them with you.                   Where to Get Your Medications        These medications were sent to [id number] - [place], [place] - [address] AT [facility] - [phone]  - [phone]   [address] [place] [address]      Phone: [phone]   cephalexin 500 MG capsule  ketorolac 10 MG tablet  oseltamivir 30 MG capsule  Scopolamine 1 MG/3DAYS patch      No follow-up provider specified.     Final diagnoses:   UTI (urinary tract infection), bacterial   Pyelonephritis   Influenza A   Hepatic steatosis   Breast lesion        ED Disposition       ED Disposition   Discharge    Condition   Stable    Comment   --               This medical record created using voice recognition software.             Jose Mendiola PA  01/28/25 2003

## 2025-01-29 LAB
BACTERIA UR QL AUTO: NORMAL /HPF
HYALINE CASTS UR QL AUTO: NORMAL /LPF
RBC # UR STRIP: NORMAL /HPF
REF LAB TEST METHOD: NORMAL
SQUAMOUS #/AREA URNS HPF: NORMAL /HPF
WBC # UR STRIP: NORMAL /HPF

## 2025-01-29 NOTE — DISCHARGE INSTRUCTIONS
As discussed, you tested positive for UTI and pyelonephritis. You are being discharged with Keflex. Take this medication as prescribed.    You tested positive for Influenza A. Your COVID, Flu B, and RSV are negative. You are being discharged home with Tamiflu.     You are also being discharged home with toradol for pain and scopalamine patch for nausea.    Your abdominal imaging today is mostly unremarkable. It did show that you have a right breast lesion. I have sent a referral to OBGYN. They will reach out to you in 1-2 business day for an appointment. You may need a mammogram. You also have a fatty liver and multiple cyst on your kidneys.    Follow up with your Primary Care Provider in 3-5 days especially if symptoms worsen.    Return to the Emergency Department if you develop any uncontrollable fever, intractable pain, nausea, vomiting.

## 2025-01-30 LAB
BACTERIA SPEC AEROBE CULT: NO GROWTH
BACTERIA SPEC AEROBE CULT: NO GROWTH

## 2025-02-08 LAB
QT INTERVAL: 374 MS
QTC INTERVAL: 421 MS

## 2025-02-10 ENCOUNTER — OFFICE VISIT (OUTPATIENT)
Dept: FAMILY MEDICINE CLINIC | Facility: CLINIC | Age: 60
End: 2025-02-10
Payer: OTHER GOVERNMENT

## 2025-02-10 VITALS
HEART RATE: 101 BPM | SYSTOLIC BLOOD PRESSURE: 146 MMHG | WEIGHT: 153 LBS | BODY MASS INDEX: 24.59 KG/M2 | OXYGEN SATURATION: 99 % | DIASTOLIC BLOOD PRESSURE: 92 MMHG | HEIGHT: 66 IN

## 2025-02-10 DIAGNOSIS — N32.81 OAB (OVERACTIVE BLADDER): ICD-10-CM

## 2025-02-10 DIAGNOSIS — N17.9 AKI (ACUTE KIDNEY INJURY): ICD-10-CM

## 2025-02-10 DIAGNOSIS — Z09 FOLLOW-UP EXAM: Primary | ICD-10-CM

## 2025-02-10 DIAGNOSIS — N30.90 CYSTITIS: ICD-10-CM

## 2025-02-10 DIAGNOSIS — M54.50 RIGHT-SIDED LOW BACK PAIN WITHOUT SCIATICA, UNSPECIFIED CHRONICITY: ICD-10-CM

## 2025-02-10 DIAGNOSIS — N39.0 URINARY TRACT INFECTION WITHOUT HEMATURIA, SITE UNSPECIFIED: ICD-10-CM

## 2025-02-10 DIAGNOSIS — R11.2 NAUSEA AND VOMITING, UNSPECIFIED VOMITING TYPE: ICD-10-CM

## 2025-02-10 DIAGNOSIS — N28.1 RENAL CYST: ICD-10-CM

## 2025-02-10 DIAGNOSIS — K76.0 HEPATIC STEATOSIS: ICD-10-CM

## 2025-02-10 DIAGNOSIS — N63.10 MASS OF RIGHT BREAST, UNSPECIFIED QUADRANT: ICD-10-CM

## 2025-02-10 LAB
ALBUMIN SERPL-MCNC: 4.1 G/DL (ref 3.5–5.2)
ANION GAP SERPL CALCULATED.3IONS-SCNC: 15 MMOL/L (ref 5–15)
BASOPHILS # BLD AUTO: 0.04 10*3/MM3 (ref 0–0.2)
BASOPHILS NFR BLD AUTO: 0.4 % (ref 0–1.5)
BILIRUB BLD-MCNC: NEGATIVE MG/DL
BUN SERPL-MCNC: 15 MG/DL (ref 6–20)
BUN/CREAT SERPL: 17.2 (ref 7–25)
CALCIUM SPEC-SCNC: 9.6 MG/DL (ref 8.6–10.5)
CHLORIDE SERPL-SCNC: 98 MMOL/L (ref 98–107)
CLARITY, POC: ABNORMAL
CO2 SERPL-SCNC: 23 MMOL/L (ref 22–29)
COLOR UR: ABNORMAL
CREAT SERPL-MCNC: 0.87 MG/DL (ref 0.57–1)
DEPRECATED RDW RBC AUTO: 41.3 FL (ref 37–54)
EGFRCR SERPLBLD CKD-EPI 2021: 77.3 ML/MIN/1.73
EOSINOPHIL # BLD AUTO: 0.04 10*3/MM3 (ref 0–0.4)
EOSINOPHIL NFR BLD AUTO: 0.4 % (ref 0.3–6.2)
ERYTHROCYTE [DISTWIDTH] IN BLOOD BY AUTOMATED COUNT: 12.7 % (ref 12.3–15.4)
GLUCOSE SERPL-MCNC: 90 MG/DL (ref 65–99)
GLUCOSE UR STRIP-MCNC: NEGATIVE MG/DL
HCT VFR BLD AUTO: 39.9 % (ref 34–46.6)
HGB BLD-MCNC: 13.5 G/DL (ref 12–15.9)
IMM GRANULOCYTES # BLD AUTO: 0.04 10*3/MM3 (ref 0–0.05)
IMM GRANULOCYTES NFR BLD AUTO: 0.4 % (ref 0–0.5)
KETONES UR QL: NEGATIVE
LEUKOCYTE EST, POC: ABNORMAL
LYMPHOCYTES # BLD AUTO: 1.26 10*3/MM3 (ref 0.7–3.1)
LYMPHOCYTES NFR BLD AUTO: 13.7 % (ref 19.6–45.3)
MCH RBC QN AUTO: 30.5 PG (ref 26.6–33)
MCHC RBC AUTO-ENTMCNC: 33.8 G/DL (ref 31.5–35.7)
MCV RBC AUTO: 90.3 FL (ref 79–97)
MONOCYTES # BLD AUTO: 0.68 10*3/MM3 (ref 0.1–0.9)
MONOCYTES NFR BLD AUTO: 7.4 % (ref 5–12)
NEUTROPHILS NFR BLD AUTO: 7.17 10*3/MM3 (ref 1.7–7)
NEUTROPHILS NFR BLD AUTO: 77.7 % (ref 42.7–76)
NITRITE UR-MCNC: NEGATIVE MG/ML
NRBC BLD AUTO-RTO: 0 /100 WBC (ref 0–0.2)
PH UR: 7 [PH] (ref 5–8)
PHOSPHATE SERPL-MCNC: 2.9 MG/DL (ref 2.5–4.5)
PLATELET # BLD AUTO: 417 10*3/MM3 (ref 140–450)
PMV BLD AUTO: 10.4 FL (ref 6–12)
POTASSIUM SERPL-SCNC: 4.2 MMOL/L (ref 3.5–5.2)
PROT UR STRIP-MCNC: ABNORMAL MG/DL
RBC # BLD AUTO: 4.42 10*6/MM3 (ref 3.77–5.28)
RBC # UR STRIP: ABNORMAL /UL
SODIUM SERPL-SCNC: 136 MMOL/L (ref 136–145)
SP GR UR: 1.02 (ref 1–1.03)
UROBILINOGEN UR QL: ABNORMAL
WBC NRBC COR # BLD AUTO: 9.23 10*3/MM3 (ref 3.4–10.8)

## 2025-02-10 PROCEDURE — 36415 COLL VENOUS BLD VENIPUNCTURE: CPT | Performed by: NURSE PRACTITIONER

## 2025-02-10 PROCEDURE — 85025 COMPLETE CBC W/AUTO DIFF WBC: CPT | Performed by: NURSE PRACTITIONER

## 2025-02-10 PROCEDURE — 99214 OFFICE O/P EST MOD 30 MIN: CPT | Performed by: NURSE PRACTITIONER

## 2025-02-10 PROCEDURE — 80069 RENAL FUNCTION PANEL: CPT | Performed by: NURSE PRACTITIONER

## 2025-02-10 PROCEDURE — 81002 URINALYSIS NONAUTO W/O SCOPE: CPT | Performed by: NURSE PRACTITIONER

## 2025-02-10 PROCEDURE — 87086 URINE CULTURE/COLONY COUNT: CPT | Performed by: NURSE PRACTITIONER

## 2025-02-10 RX ORDER — HYDROCODONE BITARTRATE AND ACETAMINOPHEN 5; 325 MG/1; MG/1
1 TABLET ORAL
COMMUNITY
Start: 2025-02-07

## 2025-02-10 RX ORDER — NITROFURANTOIN 25; 75 MG/1; MG/1
100 CAPSULE ORAL 2 TIMES DAILY
Qty: 14 CAPSULE | Refills: 0 | Status: SHIPPED | OUTPATIENT
Start: 2025-02-10

## 2025-02-10 NOTE — PROGRESS NOTES
Chief Complaint  Vomiting (Started today , had surgery Friday ) and Pain (On abx , can't keep down)    Subjective        Medical History: has a past medical history of Acid reflux, Anxiety, Arthritis, Depression, Disease of thyroid gland, Gout attack, Hypertension, Hypothyroidism, Kidney disease, chronic, stage II (GFR 60-89 ml/min), Macromastia, OAB (overactive bladder), PONV (postoperative nausea and vomiting), Renal artery stenosis, SOB (shortness of breath) on exertion, and Stress incontinence.     Surgical History: has a past surgical history that includes Appendectomy (1983); Lymph node biopsy (Left, 2001); Cholecystectomy (2012); Laparoscopic tubal ligation (Bilateral, 1994); Abdominoplasty (2016); Knee arthroscopy (Right); Dental surgery (2008); Laparoscopy (Bilateral, 10/17/2016); Colonoscopy (2013); Laparoscopy (Right, 05/07/2018); Esophagogastroduodenoscopy (2013); Blepharoplasty (Bilateral, 12/15/2022); Head/Neck Lesion/Cyst Excision (Left, 12/15/2022); Blepharoplasty (Bilateral, 12/15/2022); Cardiac catheterization (N/A, 03/06/2023); transvaginal taping suspension (N/A, 06/03/2024); and Breast Reduction (Bilateral, 9/20/2024).     Family History: family history includes Colon cancer in her maternal grandfather.     Social History: reports that she quit smoking about 10 months ago. Her smoking use included cigarettes. She started smoking about 40 years ago. She has a 58.9 pack-year smoking history. She has never used smokeless tobacco. She reports that she does not currently use alcohol. She reports that she does not use drugs.    Waleska Shah presents to [facility]    History of Present Illness  Date of encounter:  1/28/2025  Independent Historian/Clinical History and Information was obtained by:   Patient     History is limited by: N/A     Chief Complaint: Flank pain        History of Present Illness:  Patient is a 59 y.o. year old female who presents to the  emergency department for evaluation of right-sided flank pain and right lower quadrant abdominal pain that started Sunday.  Patient had a CT abdomen pelvis without contrast completed today.  It was negative.  Patient states she feels short of breath.  Admits to nausea and vomiting.  Denies dysuria and hematuria.  Denies history of kidney stones.  Denies cough. Reports chills.    Please view results for these tests on the individual orders.      COVID-19, FLU A/B, RSV PCR 1 HR TAT - Swab, Nasopharynx [[id number]]  (Abnormal) Collected: 01/28/25 1656     Specimen: Swab from Nasopharynx Updated: 01/28/25 1742       COVID19 Not Detected       Influenza A PCR Detected       Influenza B PCR Not Detected       RSV, PCR Not Detected       Patient comes in for ER follow-up  History of Present Illness  The patient presents for follow-up of influenza, urinary tract infection, right breast mass, kidney cysts, vomiting, fatty liver, and overactive bladder.    She was diagnosed with influenza and a urinary tract infection (UTI) during her emergency room visit. She reports no symptoms of burning with urination associated with the UTI. She was prescribed an antibiotic regimen, which she completed, but has since discontinued. She experienced some relief while on the antibiotics, but her symptoms have returned since stopping the medication. She has not previously consulted with a urologist. She also reports experiencing cold hands and feet.    She has been experiencing persistent vomiting since her discharge from the emergency room, with the most recent episode occurring at 5:30 AM today, characterized by the expulsion of yellow bile. She has been using Scopolamine patches for nausea management, which she finds effective. She has not attempted to use lidocaine patches for symptom relief.    She has a scheduled colonoscopy on 02/25/2025, but is uncertain about her ability to undergo the procedure due to her current health status. She  "is under the care of Dr. Walter for gastroenterology issues and is unaware of any diagnosis of fatty liver disease. She has been informed of potential kidney issues and has been referred to a nephrologist. However, she was unable to secure an appointment with the recommended specialist and instead consulted with another physician, with whom she was dissatisfied.    She has a plastic tube inserted for leakage, which was placed approximately 2 years ago at [facility]. She was advised that the tube would only be removed if it caused a reaction or if she experienced recurrent UTIs. She has not had a follow-up appointment with the physician who placed the tube.    She has a history of overactive bladder and has undergone surgery for this condition. She reports significant bloating and discomfort in her stomach.    She has a mass in her right breast that needs to be checked out.    MEDICATIONS  Current: Repatha, scopolamine patch      Objective   Vital Signs:   /92   Pulse 101   Ht 167.6 cm (66\")   Wt 69.4 kg (153 lb)   SpO2 99%   BMI 24.69 kg/m²       Wt Readings from Last 3 Encounters:   02/10/25 69.4 kg (153 lb)   01/28/25 70.6 kg (155 lb 10.3 oz)   01/28/25 70.2 kg (154 lb 12.8 oz)        BP Readings from Last 3 Encounters:   02/10/25 146/92   01/28/25 169/97   01/28/25 136/82                Physical Exam  Vitals reviewed.   Constitutional:       General: She is not in acute distress.     Appearance: Normal appearance. She is well-developed. She is not ill-appearing.   HENT:      Head: Normocephalic and atraumatic.   Eyes:      Conjunctiva/sclera: Conjunctivae normal.      Pupils: Pupils are equal, round, and reactive to light.   Cardiovascular:      Rate and Rhythm: Normal rate and regular rhythm.      Heart sounds: No murmur heard.  Pulmonary:      Effort: Pulmonary effort is normal.      Breath sounds: Normal breath sounds. No wheezing.   Abdominal:      General: Bowel sounds are normal.      " Palpations: Abdomen is soft.      Tenderness: There is abdominal tenderness. There is right CVA tenderness.   Skin:     General: Skin is warm and dry.   Neurological:      Mental Status: She is alert and oriented to person, place, and time.   Psychiatric:         Mood and Affect: Mood and affect normal.         Behavior: Behavior normal.         Thought Content: Thought content normal.         Judgment: Judgment normal.        Result Review :  {The following data was reviewed by LASHANDA Urban on 02/10/2025.  Common labs          8/29/2024    10:31 1/17/2025    13:35 1/28/2025    15:10   Common Labs   Glucose 98   105    BUN 16   24    Creatinine 1.07   1.25    Sodium 141   135    Potassium 5.0  4     4.1    Chloride 106   96    Calcium 10.1   9.8    Albumin   4.7    Total Bilirubin   0.2    Alkaline Phosphatase   103    AST (SGOT)   29    ALT (SGPT)   27    WBC 6.17   5.11    Hemoglobin 12.9   14.8    Hematocrit 38.9   44.4    Platelets 317   261       Details          This result is from an external source.             Data reviewed : ER notes             Current Outpatient Medications on File Prior to Visit   Medication Sig Dispense Refill    allopurinol (ZYLOPRIM) 300 MG tablet Take 1 tablet by mouth Daily. 90 tablet 1    clonazePAM (KlonoPIN) 0.5 MG tablet Take 0.5-1 tablets by mouth 2 (Two) Times a Day As Needed. (Patient taking differently: Take 0.5-1 tablets by mouth 2 (Two) Times a Day As Needed for Anxiety.)      Cyanocobalamin (B-12 PO) Take 1 tablet by mouth Daily. PT HOLDING FOR SURGERY      Diclofenac Sodium (VOLTAREN) 1 % gel gel Apply 4 g topically to the appropriate area as directed 4 (Four) Times a Day. 100 g 5    DULoxetine (CYMBALTA) 20 MG capsule Take 1 capsule by mouth Every Night. (Patient taking differently: Take 1 capsule by mouth Every Night.) 90 capsule 1    Evolocumab (REPATHA) solution auto-injector SureClick injection Inject 1 mL under the skin into the appropriate area as  directed Every 14 (Fourteen) Days. 6 mL 1    ezetimibe (Zetia) 10 MG tablet Take 1 tablet by mouth Daily. 90 tablet 1    fluticasone (FLONASE) 50 MCG/ACT nasal spray 2 sprays into the nostril(s) as directed by provider Daily. 16 g 1    HYDROcodone-acetaminophen (NORCO) 5-325 MG per tablet Take 1 tablet by mouth Every 4 (Four) to 6 (Six) Hours As Needed for Pain.      ketorolac (TORADOL) 10 MG tablet Take 1 tablet by mouth Every 6 (Six) Hours As Needed for Moderate Pain. 15 tablet 0    lidocaine (Lidoderm) 5 % Place 1 patch on the skin as directed by provider Daily. Remove & Discard patch within 12 hours or as directed by MD (Patient taking differently: Place 1 patch on the skin as directed by provider Daily. Remove & Discard patch within 12 hours or as directed by MD) 30 patch 3    Scopolamine 1 MG/3DAYS patch Place 1 patch on the skin as directed by provider Every 72 (Seventy-Two) Hours. 1 each 0    Scopolamine 1 MG/3DAYS patch Place 1 patch on the skin as directed by provider Every 72 (Seventy-Two) Hours. 10 each 0    vitamin D (ERGOCALCIFEROL) 1.25 MG (46480 UT) capsule capsule Take 1 capsule by mouth Every 7 (Seven) Days. WEDNESDAYS 13 capsule 1     Current Facility-Administered Medications on File Prior to Visit   Medication Dose Route Frequency Provider Last Rate Last Admin    ondansetron ODT (ZOFRAN-ODT) disintegrating tablet 4 mg  4 mg Translingual Q6H PRN Tracey Fields MD            Assessment and Plan  Diagnoses and all orders for this visit:    1. Follow-up exam (Primary)    2. Right-sided low back pain without sciatica, unspecified chronicity  -     POCT urinalysis dipstick, manual  -     CBC & Differential    3. Mass of right breast, unspecified quadrant  -     Mammo Diagnostic Digital Tomosynthesis Bilateral With CAD; Future  -     US Breast Right Limited; Future    4. Urinary tract infection without hematuria, site unspecified  -     CBC & Differential    5. Hepatic steatosis  -     Ambulatory  Referral to Gastroenterology    6. Renal cyst  -     Renal Function Panel  -     Ambulatory Referral to Nephrology    7. OAB (overactive bladder)  -     Ambulatory Referral to Gynecologic Urology    8. CJ (acute kidney injury)  -     Renal Function Panel    9. Cystitis  -     Urine Culture - Urine, Urine, Clean Catch    10. Nausea and vomiting, unspecified vomiting type  -     CBC & Differential    Other orders  -     nitrofurantoin, macrocrystal-monohydrate, (Macrobid) 100 MG capsule; Take 1 capsule by mouth 2 (Two) Times a Day.  Dispense: 14 capsule; Refill: 0      Brief Urine Lab Results  (Last result in the past 365 days)        Color   Clarity   Blood   Leuk Est   Nitrite   Protein   CREAT   Urine HCG        02/10/25 0852 Cristina   Slightly Cloudy   Trace   Small (1+)   Negative   30 mg/dL                      Assessment & Plan  1. Influenza.  She is still recovering from the flu. A complete blood count (CBC) will be ordered to monitor her recovery. If the CBC shows an elevated white count, a chest x-ray may be considered to rule out pneumonia.    2. Urinary Tract Infection (UTI).  Her urine culture was negative, but she continues to experience symptoms. A new antibiotic regimen will be initiated, and a repeat urine culture will be obtained. She is advised to maintain adequate hydration by consuming plenty of fluids.    3. Right Breast Mass.  A bilateral diagnostic mammogram and a right-sided ultrasound will be ordered to further evaluate the mass.    4. Kidney Cysts.  Her kidney function has shown some decline. A referral to Dr. Bell, a nephrologist, will be made for further evaluation. Her kidney function will be rechecked today.  Patient also had polycystic on kidneys requiring further follow-up with nephrology.    5. Vomiting.  She has been experiencing vomiting since her ER visit. She is advised to use a heating pad for pain relief and avoid medications that may upset her stomach. She has nausea patches at  home and does not require additional medication at this time.    6. Fatty Liver.  Her liver enzymes are within normal limits, but she has fatty deposits in her liver, likely due to cholesterol issues. A referral to Dr. Walter, a gastroenterologist, will be made for further evaluation and management of her fatty liver. Additional imaging will be ordered as needed.    7. Overactive Bladder.  She has had surgery for an overactive bladder and continues to experience UTIs. A referral to Dr. Keating, a urogynecologist, will be made for further evaluation and management.    PROCEDURE  The patient had a plastic tube inserted for leakage approximately 2 years  ago at [facility], for overactive bladder.      Follow Up   Return in about 6 weeks (around 3/24/2025) for Recheck.  Patient was given instructions and counseling regarding her condition or for health maintenance advice. Please see specific information pulled into the AVS if appropriate.       Part of this note may be electronic transcription/translation of spoken language to printed text using the Dragon dictation system    Patient or patient representative verbalized consent for the use of Ambient Listening during the visit with  LASHANDA Urban for chart documentation. 2/10/2025  09:27 EST

## 2025-02-11 LAB — BACTERIA SPEC AEROBE CULT: NO GROWTH

## 2025-02-12 ENCOUNTER — TELEPHONE (OUTPATIENT)
Dept: FAMILY MEDICINE CLINIC | Facility: CLINIC | Age: 60
End: 2025-02-12
Payer: OTHER GOVERNMENT

## 2025-02-12 NOTE — TELEPHONE ENCOUNTER
Caller: Waleska Shah    Relationship to patient: Self    Best call back number: [phone]    Patient is needing: PATIENT CALLED IN AND SAID THAT SHE IS NOT ABLE TO HAVE AN APPOINTMENT WITH DR. CURRIE UNTIL 5/14/2025 AND WOULD LIKE TO KNOW IF MS. [facility]ASS[facility] WOULD GIVE ANOTHER REFERRAL TO SOMEONE THAT COULD SEE HER SOONER. PATIENT SAID IT IS OKAY TO LEAVE MESSAGE ON PHONE.

## 2025-02-13 NOTE — TELEPHONE ENCOUNTER
REFERRAL HAS BEEN CHANGED TO DR EILEEN JACKSON [address] [place] [address] [facility] PHONE [phone]   negative

## 2025-02-26 ENCOUNTER — OFFICE VISIT (OUTPATIENT)
Dept: FAMILY MEDICINE CLINIC | Facility: CLINIC | Age: 60
End: 2025-02-26
Payer: OTHER GOVERNMENT

## 2025-02-26 VITALS
HEART RATE: 76 BPM | SYSTOLIC BLOOD PRESSURE: 138 MMHG | DIASTOLIC BLOOD PRESSURE: 86 MMHG | OXYGEN SATURATION: 99 % | BODY MASS INDEX: 25.55 KG/M2 | WEIGHT: 159 LBS | TEMPERATURE: 97.8 F | HEIGHT: 66 IN

## 2025-02-26 DIAGNOSIS — R10.9 RIGHT SIDED ABDOMINAL PAIN: ICD-10-CM

## 2025-02-26 DIAGNOSIS — E78.2 MIXED HYPERLIPIDEMIA: ICD-10-CM

## 2025-02-26 DIAGNOSIS — R53.82 CHRONIC FATIGUE: ICD-10-CM

## 2025-02-26 DIAGNOSIS — I70.90 ATHEROSCLEROTIC VASCULAR DISEASE: ICD-10-CM

## 2025-02-26 DIAGNOSIS — D50.9 IRON DEFICIENCY ANEMIA, UNSPECIFIED IRON DEFICIENCY ANEMIA TYPE: ICD-10-CM

## 2025-02-26 DIAGNOSIS — Z09 FOLLOW-UP EXAM: Primary | ICD-10-CM

## 2025-02-26 DIAGNOSIS — E53.8 VITAMIN B12 DEFICIENCY: ICD-10-CM

## 2025-02-26 DIAGNOSIS — K76.0 FATTY LIVER: ICD-10-CM

## 2025-02-26 DIAGNOSIS — E55.9 VITAMIN D DEFICIENCY: ICD-10-CM

## 2025-02-26 DIAGNOSIS — L29.9 PRURITUS: ICD-10-CM

## 2025-02-26 LAB
25(OH)D3 SERPL-MCNC: 47.2 NG/ML (ref 30–100)
BILIRUB BLD-MCNC: NEGATIVE MG/DL
CHOLEST SERPL-MCNC: 265 MG/DL (ref 0–200)
CLARITY, POC: CLEAR
COLOR UR: YELLOW
FERRITIN SERPL-MCNC: 223 NG/ML (ref 13–150)
FOLATE SERPL-MCNC: >20 NG/ML (ref 4.78–24.2)
GLUCOSE UR STRIP-MCNC: NEGATIVE MG/DL
HDLC SERPL-MCNC: 50 MG/DL (ref 40–60)
IRON 24H UR-MRATE: 63 MCG/DL (ref 37–145)
IRON SATN MFR SERPL: 18 % (ref 20–50)
KETONES UR QL: NEGATIVE
LDLC SERPL CALC-MCNC: 144 MG/DL (ref 0–100)
LDLC/HDLC SERPL: 2.76 {RATIO}
LEUKOCYTE EST, POC: ABNORMAL
NITRITE UR-MCNC: NEGATIVE MG/ML
PH UR: 7 [PH] (ref 5–8)
PROT UR STRIP-MCNC: NEGATIVE MG/DL
RBC # UR STRIP: NEGATIVE /UL
SP GR UR: 1.01 (ref 1–1.03)
TIBC SERPL-MCNC: 355 MCG/DL (ref 298–536)
TRANSFERRIN SERPL-MCNC: 238 MG/DL (ref 200–360)
TRIGL SERPL-MCNC: 385 MG/DL (ref 0–150)
UROBILINOGEN UR QL: ABNORMAL
VIT B12 BLD-MCNC: 436 PG/ML (ref 211–946)
VLDLC SERPL-MCNC: 71 MG/DL (ref 5–40)

## 2025-02-26 PROCEDURE — 82607 VITAMIN B-12: CPT | Performed by: NURSE PRACTITIONER

## 2025-02-26 PROCEDURE — 99214 OFFICE O/P EST MOD 30 MIN: CPT | Performed by: NURSE PRACTITIONER

## 2025-02-26 PROCEDURE — 36415 COLL VENOUS BLD VENIPUNCTURE: CPT | Performed by: NURSE PRACTITIONER

## 2025-02-26 PROCEDURE — 82746 ASSAY OF FOLIC ACID SERUM: CPT | Performed by: NURSE PRACTITIONER

## 2025-02-26 PROCEDURE — 84466 ASSAY OF TRANSFERRIN: CPT | Performed by: NURSE PRACTITIONER

## 2025-02-26 PROCEDURE — 82728 ASSAY OF FERRITIN: CPT | Performed by: NURSE PRACTITIONER

## 2025-02-26 PROCEDURE — 87086 URINE CULTURE/COLONY COUNT: CPT | Performed by: NURSE PRACTITIONER

## 2025-02-26 PROCEDURE — 81002 URINALYSIS NONAUTO W/O SCOPE: CPT | Performed by: NURSE PRACTITIONER

## 2025-02-26 PROCEDURE — 83540 ASSAY OF IRON: CPT | Performed by: NURSE PRACTITIONER

## 2025-02-26 PROCEDURE — 80061 LIPID PANEL: CPT | Performed by: NURSE PRACTITIONER

## 2025-02-26 PROCEDURE — 82306 VITAMIN D 25 HYDROXY: CPT | Performed by: NURSE PRACTITIONER

## 2025-02-26 NOTE — PROGRESS NOTES
Chief Complaint  Shoulder Pain (F/U) and Abdominal Pain (X couple weeks ,Rt side pain )    Subjective        Medical History: has a past medical history of Acid reflux, Anxiety, Arthritis, Depression, Disease of thyroid gland, Gout attack, Hypertension, Hypothyroidism, Kidney disease, chronic, stage II (GFR 60-89 ml/min), Macromastia, OAB (overactive bladder), PONV (postoperative nausea and vomiting), Renal artery stenosis, SOB (shortness of breath) on exertion, and Stress incontinence.     Surgical History: has a past surgical history that includes Appendectomy (1983); Lymph node biopsy (Left, 2001); Cholecystectomy (2012); Laparoscopic tubal ligation (Bilateral, 1994); Abdominoplasty (2016); Knee arthroscopy (Right); Dental surgery (2008); Laparoscopy (Bilateral, 10/17/2016); Colonoscopy (2013); Laparoscopy (Right, 05/07/2018); Esophagogastroduodenoscopy (2013); Blepharoplasty (Bilateral, 12/15/2022); Head/Neck Lesion/Cyst Excision (Left, 12/15/2022); Blepharoplasty (Bilateral, 12/15/2022); Cardiac catheterization (N/A, 03/06/2023); transvaginal taping suspension (N/A, 06/03/2024); and Breast Reduction (Bilateral, 9/20/2024).     Family History: family history includes Colon cancer in her maternal grandfather.     Social History: reports that she quit smoking about 10 months ago. Her smoking use included cigarettes. She started smoking about 40 years ago. She has a 58.9 pack-year smoking history. She has never used smokeless tobacco. She reports that she does not currently use alcohol. She reports that she does not use drugs.    Waleska Shah presents to [facility]    History of Present Illness    History of Present Illness  The patient presents for evaluation of right-sided chronic pain, fatty liver, and poor vision.    She reports persistent right-sided swelling and puffiness, which she attributes to her recent breast reduction. She has been unable to secure an appointment  with her nephrologist until May 2024 and is seeking alternative treatment options. She has previously consulted with a nephrologist and is scheduled for a follow-up in July 2024.     She underwent a colonoscopy yesterday, during which polyps were removed. She is scheduled for a follow-up colonoscopy in 5 years.    She has been diagnosed with fatty liver disease but has not yet undergone a liver ultrasound. She has a history of cholecystectomy.    She recently had breast reduction surgery, which significantly improved her breathing difficulties. However, she continues to experience chronic side pain, shortness of breath, and loss of appetite. She also reports constant fatigue, low energy levels, and lack of motivation. She has a follow-up appointment with Dr. Waterhouse tomorrow, who applied additional glue and tape to the surgical site. She has been using lidocaine patches for pain relief, which provide minimal relief. She was previously prescribed tramadol but discontinued its use due to excessive drowsiness.    She reports pruritus on her face and hair. She has not sought dermatological consultation for this issue.    She reports poor vision and the presence of yellowish discoloration in her eyes. She is requesting an authorization letter for an ophthalmologist.    She has undergone a sleep study and is under the care of Dr. Antony, a pulmonologist. She is scheduled for a pulmonary function test in March 2024. Her cough medication dosage was increased, which has alleviated her cough, but she continues to expectorate.    She has been on Repatha since last year. She did not tolerate statins at all. She did not tolerate the statins at all. She was on Zetia, which was not effective. She tried atorvastatin, but she could not tolerate it.    Supplemental Information  She had a bladder sling surgery. She started having complications after that. Her previous gynecologist retired, and she is now seeing Dr. Keating.    SOCIAL  "HISTORY  The patient quit smoking on 04/09/2024     FAMILY HISTORY  The patient has a family history of colon cancer. Her mother had breast cancer and was a smoker.    MEDICATIONS  Current: Repatha, lidocaine patches  Discontinued: tramadol, Zetia, atorvastatin      Objective   Vital Signs:   /86   Pulse 76   Temp 97.8 °F (36.6 °C)   Ht 167.6 cm (66\")   Wt 72.1 kg (159 lb)   SpO2 99%   BMI 25.66 kg/m²       Wt Readings from Last 3 Encounters:   02/26/25 72.1 kg (159 lb)   02/10/25 69.4 kg (153 lb)   01/28/25 70.6 kg (155 lb 10.3 oz)        BP Readings from Last 3 Encounters:   02/26/25 138/86   02/10/25 146/92   01/28/25 169/97        BMI is within normal parameters. No other follow-up for BMI required.       Physical Exam  Vitals reviewed.   Constitutional:       General: She is not in acute distress.     Appearance: Normal appearance. She is well-developed. She is not ill-appearing.   HENT:      Head: Normocephalic and atraumatic.   Eyes:      Conjunctiva/sclera: Conjunctivae normal.      Pupils: Pupils are equal, round, and reactive to light.   Cardiovascular:      Rate and Rhythm: Normal rate and regular rhythm.      Heart sounds: No murmur heard.  Pulmonary:      Effort: Pulmonary effort is normal.      Breath sounds: Normal breath sounds. No wheezing.   Chest:      Chest wall: Tenderness present. No mass, deformity, swelling or crepitus.       Musculoskeletal:        Back:    Skin:     General: Skin is warm and dry.   Neurological:      Mental Status: She is alert and oriented to person, place, and time.   Psychiatric:         Mood and Affect: Mood and affect normal.         Behavior: Behavior normal.         Thought Content: Thought content normal.         Judgment: Judgment normal.        Result Review :  {The following data was reviewed by LASHANDA Urban on 02/26/2025.  Common labs          1/17/2025    13:35 1/28/2025    15:10 2/10/2025    10:37   Common Labs   Glucose  105  90  "   BUN  24  15    Creatinine  1.25  0.87    Sodium  135  136    Potassium 4     4.1  4.2    Chloride  96  98    Calcium  9.8  9.6    Albumin  4.7  4.1    Total Bilirubin  0.2     Alkaline Phosphatase  103     AST (SGOT)  29     ALT (SGPT)  27     WBC  5.11  9.23    Hemoglobin  14.8  13.5    Hematocrit  44.4  39.9    Platelets  261  417       Details          This result is from an external source.             Data reviewed : previous office note             Current Outpatient Medications on File Prior to Visit   Medication Sig Dispense Refill    allopurinol (ZYLOPRIM) 300 MG tablet Take 1 tablet by mouth Daily. 90 tablet 1    clonazePAM (KlonoPIN) 0.5 MG tablet Take 0.5-1 tablets by mouth 2 (Two) Times a Day As Needed. (Patient taking differently: Take 0.5-1 tablets by mouth 2 (Two) Times a Day As Needed for Anxiety.)      Cyanocobalamin (B-12 PO) Take 1 tablet by mouth Daily. PT HOLDING FOR SURGERY      Diclofenac Sodium (VOLTAREN) 1 % gel gel Apply 4 g topically to the appropriate area as directed 4 (Four) Times a Day. 100 g 5    DULoxetine (CYMBALTA) 20 MG capsule Take 1 capsule by mouth Every Night. (Patient taking differently: Take 1 capsule by mouth Every Night.) 90 capsule 1    Evolocumab (REPATHA) solution auto-injector SureClick injection Inject 1 mL under the skin into the appropriate area as directed Every 14 (Fourteen) Days. 6 mL 1    ezetimibe (Zetia) 10 MG tablet Take 1 tablet by mouth Daily. 90 tablet 1    fluticasone (FLONASE) 50 MCG/ACT nasal spray 2 sprays into the nostril(s) as directed by provider Daily. 16 g 1    HYDROcodone-acetaminophen (NORCO) 5-325 MG per tablet Take 1 tablet by mouth Every 4 (Four) to 6 (Six) Hours As Needed for Pain.      ketorolac (TORADOL) 10 MG tablet Take 1 tablet by mouth Every 6 (Six) Hours As Needed for Moderate Pain. 15 tablet 0    lidocaine (Lidoderm) 5 % Place 1 patch on the skin as directed by provider Daily. Remove & Discard patch within 12 hours or as directed  by MD (Patient taking differently: Place 1 patch on the skin as directed by provider Daily. Remove & Discard patch within 12 hours or as directed by MD) 30 patch 3    Scopolamine 1 MG/3DAYS patch Place 1 patch on the skin as directed by provider Every 72 (Seventy-Two) Hours. 1 each 0    Scopolamine 1 MG/3DAYS patch Place 1 patch on the skin as directed by provider Every 72 (Seventy-Two) Hours. 10 each 0    vitamin D (ERGOCALCIFEROL) 1.25 MG (37042 UT) capsule capsule Take 1 capsule by mouth Every 7 (Seven) Days. WEDNESDAYS 13 capsule 1    nitrofurantoin, macrocrystal-monohydrate, (Macrobid) 100 MG capsule Take 1 capsule by mouth 2 (Two) Times a Day. 14 capsule 0     Current Facility-Administered Medications on File Prior to Visit   Medication Dose Route Frequency Provider Last Rate Last Admin    ondansetron ODT (ZOFRAN-ODT) disintegrating tablet 4 mg  4 mg Translingual Q6H PRN Tracey Fields MD          Brief Urine Lab Results  (Last result in the past 365 days)        Color   Clarity   Blood   Leuk Est   Nitrite   Protein   CREAT   Urine HCG        02/26/25 1025 Yellow   Clear   Negative   Trace   Negative   Negative                  Assessment and Plan  Diagnoses and all orders for this visit:    1. Follow-up exam (Primary)    2. Right sided abdominal pain  -     POCT urinalysis dipstick, manual  -     Urine Culture - Urine, Urine, Clean Catch  -     traMADol-acetaminophen (Ultracet) 37.5-325 MG per tablet; Take 1 tablet by mouth Every 6 (Six) Hours As Needed for Moderate Pain.  Dispense: 60 tablet; Refill: 1    3. Pruritus  -     Ambulatory Referral to Dermatology    4. Fatty liver  -     US Liver; Future    5. Chronic fatigue    6. Vitamin D deficiency  -     Vitamin D,25-Hydroxy    7. Iron deficiency anemia, unspecified iron deficiency anemia type  -     Iron Profile  -     Ferritin    8. Atherosclerotic vascular disease  -     US Liver; Future  -     Lipid Panel    9. Mixed hyperlipidemia  -     US  Liver; Future  -     Lipid Panel    10. Vitamin B12 deficiency  -     Vitamin B12 & Folate        Assessment & Plan  1. Chronic right-sided pain.  Her kidney function has shown improvement, with a GFR of 77 and normal BUN and creatinine levels recorded two weeks ago. The primary concern is the narrowing of the kidney vessels, necessitating more frequent consultations with a nephrologist. She is advised to continue being on the cancellation list for any earlier appointments. A urine culture will be conducted today to ensure accurate results. A prescription for Ultracet 37.5 mg has been issued to manage her pain.    2. Fatty liver.  She has a genetic predisposition to severe familial hypercholesterolemia, which has resulted in plaque accumulation in her arteries and veins, as evidenced by ultrasound and imaging of her kidneys and vessels. She also has a fatty liver, unrelated to being overweight or having a fatty diet. 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 An ultrasound of the liver will be ordered to further investigate the condition.    3. Familial hypercholesterolemia.  She did not tolerate statins, Zetia, and atorvastatin, and is currently on Repatha. Her lipid levels have shown a slight decrease since starting Repatha. A lipid panel will be rechecked today to assess her current levels.    4. Itching.  A referral to dermatology has been made. She will be contacted by the dermatology department to schedule an appointment. If she does not hear from them, she is advised to call the office.    5. Poor vision.  A referral to an ophthalmologist has been made for further evaluation and management of her vision issues. She will be contacted by the ophthalmologist's office to schedule an appointment.    6. Health maintenance.  She had a colonoscopy done yesterday to remove some polyps. She will need another colonoscopy in 5 years due to the presence of polyps. Laboratory tests will be  conducted today to assess her cholesterol and vitamin levels.    PROCEDURE  The patient underwent a colonoscopy yesterday during which some polyps were removed.    The patient recently had breast reduction surgery.      Follow Up   Return in about 3 months (around 5/26/2025).  Patient was given instructions and counseling regarding her condition or for health maintenance advice. Please see specific information pulled into the AVS if appropriate.       Part of this note may be electronic transcription/translation of spoken language to printed text using the Dragon dictation system    Patient or patient representative verbalized consent for the use of Ambient Listening during the visit with  LASHANDA Urban for chart documentation. 2/26/2025  06:56 EST

## 2025-02-27 LAB — BACTERIA SPEC AEROBE CULT: NO GROWTH

## 2025-03-06 ENCOUNTER — HOSPITAL ENCOUNTER (OUTPATIENT)
Dept: CT IMAGING | Facility: HOSPITAL | Age: 60
Discharge: HOME | End: 2025-03-06
Payer: OTHER GOVERNMENT

## 2025-03-06 ENCOUNTER — HOSPITAL ENCOUNTER (OUTPATIENT)
Dept: RESPIRATORY THERAPY | Facility: HOSPITAL | Age: 60
Discharge: HOME | End: 2025-03-06
Payer: OTHER GOVERNMENT

## 2025-03-06 DIAGNOSIS — R05.3 CHRONIC COUGH: ICD-10-CM

## 2025-03-06 DIAGNOSIS — J44.9 CHRONIC OBSTRUCTIVE PULMONARY DISEASE, UNSPECIFIED COPD TYPE: ICD-10-CM

## 2025-03-06 PROCEDURE — 94060 EVALUATION OF WHEEZING: CPT

## 2025-03-06 PROCEDURE — 71250 CT THORAX DX C-: CPT

## 2025-03-06 PROCEDURE — 94729 DIFFUSING CAPACITY: CPT

## 2025-03-06 PROCEDURE — 94726 PLETHYSMOGRAPHY LUNG VOLUMES: CPT

## 2025-03-06 RX ORDER — ALBUTEROL SULFATE 0.83 MG/ML
2.5 SOLUTION RESPIRATORY (INHALATION) ONCE
Status: COMPLETED | OUTPATIENT
Start: 2025-03-06 | End: 2025-03-06

## 2025-03-06 RX ADMIN — ALBUTEROL SULFATE 2.5 MG: 2.5 SOLUTION RESPIRATORY (INHALATION) at 10:29

## 2025-03-14 ENCOUNTER — HOSPITAL ENCOUNTER (OUTPATIENT)
Dept: ULTRASOUND IMAGING | Facility: HOSPITAL | Age: 60
Discharge: HOME | End: 2025-03-14
Admitting: NURSE PRACTITIONER
Payer: OTHER GOVERNMENT

## 2025-03-14 DIAGNOSIS — K76.0 FATTY LIVER: ICD-10-CM

## 2025-03-14 DIAGNOSIS — I70.90 ATHEROSCLEROTIC VASCULAR DISEASE: ICD-10-CM

## 2025-03-14 DIAGNOSIS — E78.2 MIXED HYPERLIPIDEMIA: ICD-10-CM

## 2025-03-14 PROCEDURE — 76705 ECHO EXAM OF ABDOMEN: CPT

## 2025-04-16 ENCOUNTER — OFFICE VISIT (OUTPATIENT)
Dept: FAMILY MEDICINE CLINIC | Facility: CLINIC | Age: 60
End: 2025-04-16
Payer: OTHER GOVERNMENT

## 2025-04-16 VITALS
BODY MASS INDEX: 26.03 KG/M2 | OXYGEN SATURATION: 100 % | HEIGHT: 66 IN | DIASTOLIC BLOOD PRESSURE: 80 MMHG | TEMPERATURE: 97.9 F | HEART RATE: 68 BPM | SYSTOLIC BLOOD PRESSURE: 128 MMHG | WEIGHT: 162 LBS

## 2025-04-16 DIAGNOSIS — K76.0 FATTY LIVER DISEASE, NONALCOHOLIC: ICD-10-CM

## 2025-04-16 DIAGNOSIS — R16.0 HEPATOMEGALY: ICD-10-CM

## 2025-04-16 DIAGNOSIS — L29.9 PRURITUS: ICD-10-CM

## 2025-04-16 DIAGNOSIS — R10.11 RIGHT UPPER QUADRANT ABDOMINAL PAIN: Primary | ICD-10-CM

## 2025-04-16 LAB
ALBUMIN SERPL-MCNC: 4.6 G/DL (ref 3.5–5.2)
ALP SERPL-CCNC: 101 U/L (ref 39–117)
ALT SERPL W P-5'-P-CCNC: 16 U/L (ref 1–33)
AST SERPL-CCNC: 26 U/L (ref 1–32)
BILIRUB CONJ SERPL-MCNC: <0.1 MG/DL (ref 0–0.3)
BILIRUB INDIRECT SERPL-MCNC: NORMAL MG/DL
BILIRUB SERPL-MCNC: <0.2 MG/DL (ref 0–1.2)
LIPASE SERPL-CCNC: 134 U/L (ref 13–60)
PROT SERPL-MCNC: 7.3 G/DL (ref 6–8.5)

## 2025-04-16 PROCEDURE — 80076 HEPATIC FUNCTION PANEL: CPT | Performed by: NURSE PRACTITIONER

## 2025-04-16 PROCEDURE — 83690 ASSAY OF LIPASE: CPT | Performed by: NURSE PRACTITIONER

## 2025-04-16 PROCEDURE — 36415 COLL VENOUS BLD VENIPUNCTURE: CPT | Performed by: NURSE PRACTITIONER

## 2025-04-16 PROCEDURE — 99214 OFFICE O/P EST MOD 30 MIN: CPT | Performed by: NURSE PRACTITIONER

## 2025-04-16 RX ORDER — LIDOCAINE 50 MG/G
1 PATCH TOPICAL EVERY 24 HOURS
Start: 2025-04-16

## 2025-04-16 RX ORDER — HYDROCORTISONE 25 MG/G
1 CREAM TOPICAL EVERY 12 HOURS SCHEDULED
Status: SHIPPED | OUTPATIENT
Start: 2025-04-16

## 2025-04-16 NOTE — PROGRESS NOTES
"  ENCOUNTER DATE:  04/16/2025    Waleska Shah / 59 y.o. / female      CHIEF COMPLAINT     Abdominal Pain (Rt Side , 6 week f/u ), Rash (ON FACE ), and Annual Exam      VITALS     Visit Vitals  /80   Pulse 68   Temp 97.9 °F (36.6 °C)   Ht 167.6 cm (66\")   Wt 73.5 kg (162 lb)   LMP 04/08/2018   SpO2 100%   BMI 26.15 kg/m²       BP Readings from Last 3 Encounters:   04/16/25 128/80   02/26/25 138/86   02/10/25 146/92     Wt Readings from Last 3 Encounters:   04/16/25 73.5 kg (162 lb)   02/26/25 72.1 kg (159 lb)   02/10/25 69.4 kg (153 lb)      Body mass index is 26.15 kg/m².    MEDICATIONS     Current Outpatient Medications on File Prior to Visit   Medication Sig Dispense Refill    allopurinol (ZYLOPRIM) 300 MG tablet Take 1 tablet by mouth Daily. 90 tablet 1    clonazePAM (KlonoPIN) 0.5 MG tablet Take 0.5-1 tablets by mouth 2 (Two) Times a Day As Needed. (Patient taking differently: Take 0.5-1 tablets by mouth 2 (Two) Times a Day As Needed for Anxiety.)      Cyanocobalamin (B-12 PO) Take 1 tablet by mouth Daily. PT HOLDING FOR SURGERY      Diclofenac Sodium (VOLTAREN) 1 % gel gel Apply 4 g topically to the appropriate area as directed 4 (Four) Times a Day. 100 g 5    DULoxetine (CYMBALTA) 20 MG capsule Take 1 capsule by mouth Every Night. (Patient taking differently: Take 1 capsule by mouth Every Night.) 90 capsule 1    Evolocumab (REPATHA) solution auto-injector SureClick injection Inject 1 mL under the skin into the appropriate area as directed Every 14 (Fourteen) Days. 6 mL 1    ezetimibe (Zetia) 10 MG tablet Take 1 tablet by mouth Daily. 90 tablet 1    fluticasone (FLONASE) 50 MCG/ACT nasal spray 2 sprays into the nostril(s) as directed by provider Daily. 16 g 1    HYDROcodone-acetaminophen (NORCO) 5-325 MG per tablet Take 1 tablet by mouth Every 4 (Four) to 6 (Six) Hours As Needed for Pain.      ketorolac (TORADOL) 10 MG tablet Take 1 tablet by mouth Every 6 (Six) Hours As Needed for Moderate Pain. 15 " "tablet 0    lidocaine (Lidoderm) 5 % Place 1 patch on the skin as directed by provider Daily. Remove & Discard patch within 12 hours or as directed by MD (Patient taking differently: Place 1 patch on the skin as directed by provider Daily. Remove & Discard patch within 12 hours or as directed by MD) 30 patch 3    Scopolamine 1 MG/3DAYS patch Place 1 patch on the skin as directed by provider Every 72 (Seventy-Two) Hours. 1 each 0    traMADol-acetaminophen (Ultracet) 37.5-325 MG per tablet Take 1 tablet by mouth Every 6 (Six) Hours As Needed for Moderate Pain. 60 tablet 1    vitamin D (ERGOCALCIFEROL) 1.25 MG (87695 UT) capsule capsule Take 1 capsule by mouth Every 7 (Seven) Days. WEDNESDAYS 13 capsule 1    nitrofurantoin, macrocrystal-monohydrate, (Macrobid) 100 MG capsule Take 1 capsule by mouth 2 (Two) Times a Day. 14 capsule 0    [DISCONTINUED] Scopolamine 1 MG/3DAYS patch Place 1 patch on the skin as directed by provider Every 72 (Seventy-Two) Hours. 10 each 0     Current Facility-Administered Medications on File Prior to Visit   Medication Dose Route Frequency Provider Last Rate Last Admin    ondansetron ODT (ZOFRAN-ODT) disintegrating tablet 4 mg  4 mg Translingual Q6H PRN Tracey Fields MD             HISTORY OF PRESENT ILLNESS     Waleska presents for annual health maintenance visit.  No results found for: \"HPVAPTIMA\"   Last health maintenance visit: approximately 1 year ago  General health: good  Lifestyle:  Attempting to lose weight?: No   Diet: eats moderately healthy  Exercise: generally stays active (work/exercise)  Tobacco: Former smoker   Alcohol: does not drink  Work: Retired  Reproductive health:  Sexually active?: No   Sexual problems?: No problems  Concern for STD?: No    Sees Gynecologist?: Yes   Terri/Postmenopausal?: Yes   Domestic abuse concerns: No   Depression Screening:          PHQ-9 Depression Screening  Little interest or pleasure in doing things?     Feeling down, depressed, or " "hopeless?     PHQ-2 Total Score     Trouble falling or staying asleep, or sleeping too much?     Feeling tired or having little energy?     Poor appetite or overeating?     Feeling bad about yourself - or that you are a failure or have let yourself or your family down?     Trouble concentrating on things, such as reading the newspaper or watching television?     Moving or speaking so slowly that other people could have noticed? Or the opposite - being so fidgety or restless that you have been moving around a lot more than usual?     Thoughts that you would be better off dead, or of hurting yourself in some way?     PHQ-9 Total Score     If you checked off any problems, how difficult have these problems made it for you to do your work, take care of things at home, or get along with other people?         TOMAS-7           Patient Care Team:  Erick Sandhu APRN as PCP - General (Nurse Practitioner)  Lara Bullock MD as Consulting Physician (Urology)  Lila Goldstein MD as Consulting Physician (Cardiology)      ALLERGIES  Allergies   Allergen Reactions    Amoxicillin Itching    Hydrocodone GI Intolerance     PT STATES SHE IS BOTHERED ONLY BY \"GENERIC\" HYDROCODONE.  ALSO GETS A HEADACHE AND BLURRED VISION WITH \"GENERIC\" HYDROCODONE    Ibuprofen Nausea And Vomiting    Percocet [Oxycodone-Acetaminophen] Itching and Rash    Tylenol With Codeine #3 [Acetaminophen-Codeine] Nausea And Vomiting and Other (See Comments)     Vision blurry    Statins Myalgia        PFSH:     The following portions of the patient's history were reviewed and updated as appropriate: Allergies / Current Medications / Past Medical History / Surgical History / Social History / Family History    PROBLEM LIST   Patient Active Problem List   Diagnosis    Right ovarian cyst    Exertional dyspnea    Chest pain    Coronary artery disease, non-occlusive       PAST MEDICAL HISTORY  Past Medical History:   Diagnosis Date    Acid reflux     no meds " "   Anxiety     Arthritis     rt knee    Depression     Disease of thyroid gland     low    Gout attack     RT TOE    Hypertension     PT STATES QUIT TAKING HTN MEDS \"LONG TIME AGO\"    Hypothyroidism     TOOK MEDS FOR 6 MONTHS    Kidney disease, chronic, stage II (GFR 60-89 ml/min)     Macromastia     OAB (overactive bladder)     PONV (postoperative nausea and vomiting)     STATES WOULD LIKE SCOP PATCH AM OF SURGERY    Renal artery stenosis     SOB (shortness of breath) on exertion     Stress incontinence     DAILY PAD       SURGICAL HISTORY  Past Surgical History:   Procedure Laterality Date    ABDOMINOPLASTY  2016    APPENDECTOMY  1983    BILATERAL BREAST REDUCTION Bilateral 09/20/2024    Procedure: BILATERAL BREAST REDUCTION;  Surgeon: Waterhouse, Maurine, MD;  Location: [facility];  Service: Plastics;  Laterality: Bilateral;    BLEPHAROPLASTY Bilateral 12/15/2022    Procedure: BILATERAL UPPER EYELID BLEPHAROPLASTY;  Surgeon: Royer Coyle MD;  Location: [facility];  Service: Ophthalmology;  Laterality: Bilateral;    BLEPHAROPLASTY Bilateral 12/15/2022    Procedure: BILATERAL LOWER EYELID BLEPHAROPLASTY;  Surgeon: Royer Coyle MD;  Location: [facility] OR;  Service: Ophthalmology;  Laterality: Bilateral;  NEW IMAGE    CARDIAC CATHETERIZATION N/A 03/06/2023    Procedure: Left Heart Cath;  Surgeon: Bj Che MD;  Location: [facility] CATH INVASIVE LOCATION;  Service: Cardiovascular;  Laterality: N/A;    CHOLECYSTECTOMY  2012    COLONOSCOPY  2013    DENTAL PROCEDURE  2008    DENTAL IMPLANTS    DIAGNOSTIC LAPAROSCOPY Bilateral 10/17/2016    Procedure: LAPAROSCOPY W/ JAMAL SALPINGECTOMY LT SALPINGO-OOPHORECTOMY;  Surgeon: Bisi Donnelly MD;  Location: [facility];  Service:     DIAGNOSTIC LAPAROSCOPY Right 05/07/2018    Procedure: LAPAROSCOPIC RIGHT OVARIAN CYSTECTOMY LYSIS OF ADHESIONS LEFT ABDOMINAL WALL;  Surgeon: Bisi Donnelly MD;  Location: [facility];  Service: " Gynecology    ENDOSCOPY  2013    HEAD/NECK LESION/CYST EXCISION Left 12/15/2022    Procedure: LEFT EXCISION AND REPAIR OF BROW CYST;  Surgeon: Royer Coyle MD;  Location: [facility];  Service: Ophthalmology;  Laterality: Left;    KNEE ARTHROSCOPY Right     X 3    LAPAROSCOPIC TUBAL LIGATION Bilateral 1994    LYMPH NODE BIOPSY Left     GROIN    TRANSVAGINAL TAPING SUSPENSION N/A 2024    Procedure: TENSION FREE VAGINAL TAPING;  Surgeon: Julissa Robin MD;  Location: [facility];  Service: Obstetrics/Gynecology;  Laterality: N/A;       SOCIAL HISTORY  Social History     Socioeconomic History    Marital status:    Tobacco Use    Smoking status: Former     Current packs/day: 0.00     Average packs/day: 1.5 packs/day for 39.3 years (58.9 ttl pk-yrs)     Types: Cigarettes     Start date:      Quit date: 2024     Years since quittin.0    Smokeless tobacco: Never    Tobacco comments:     WAS SMOKING 2PPD QUIT 2024   Vaping Use    Vaping status: Never Used   Substance and Sexual Activity    Alcohol use: Not Currently    Drug use: Never    Sexual activity: Yes     Partners: Male     Birth control/protection: Post-menopausal       FAMILY HISTORY  Family History   Problem Relation Age of Onset    Colon cancer Maternal Grandfather         50'S    Malig Hyperthermia Neg Hx        IMMUNIZATION HISTORY  Immunization History   Administered Date(s) Administered    COVID-19 (PFIZER) Purple Cap Monovalent 2021, 2021    Hepatitis B 1998, 10/19/1998, 1999         Abdominal Pain    Rash        History of Present Illness  The patient presents for evaluation of shortness of breath, facial itching, and fatty liver.    She reports experiencing bloating and shortness of breath, even during mild exertion such as walking a mile. No history of asthma is reported, but inhalers have been prescribed, which are used irregularly. Consultation with a pulmonologist has  occurred, and pulmonary function tests have been performed. Prednisone was prescribed during the last pulmonologist visit, which has been completed. A productive cough with green sputum is reported, and medication for this cough is taken daily before meals. Nasal spray is not frequently used.    A diagnosis of fatty liver disease has been made, and she is currently on the cancellation list for a gastroenterology appointment. Regular bowel movements and gas are reported.    Referral to a dermatologist has been made due to facial itching. Self-treatment with over-the-counter Benadryl cream has been attempted, but symptoms persist. No makeup products are used that could potentially irritate the skin.    A colonoscopy was performed in 02/2025, with polyps removed, and a recommendation for follow-up every 5 years was given. She is not sexually active and sees a gynecologist. A pneumonia vaccine has been received.    PAST SURGICAL HISTORY:  - Appendix removal  - Gallbladder removal    SOCIAL HISTORY  The patient quit smoking over a year ago. She does not drink alcohol.         Physical Exam  Vitals reviewed.   Constitutional:       General: She is not in acute distress.     Appearance: Normal appearance. She is well-developed. She is not ill-appearing.   HENT:      Head: Normocephalic and atraumatic.      Right Ear: External ear normal.      Left Ear: External ear normal.   Eyes:      Conjunctiva/sclera: Conjunctivae normal.      Pupils: Pupils are equal, round, and reactive to light.   Cardiovascular:      Rate and Rhythm: Normal rate and regular rhythm.      Heart sounds: No murmur heard.  Pulmonary:      Effort: Pulmonary effort is normal.      Breath sounds: Normal breath sounds. No wheezing.   Skin:     General: Skin is warm and dry.   Neurological:      Mental Status: She is alert and oriented to person, place, and time.   Psychiatric:         Mood and Affect: Mood and affect normal.         Behavior: Behavior  normal.         Thought Content: Thought content normal.         Judgment: Judgment normal.         REVIEWED DATA      Labs:    Lab Results   Component Value Date     02/10/2025    K 4.2 02/10/2025    CALCIUM 9.6 02/10/2025    AST 29 01/28/2025    ALT 27 01/28/2025    BUN 15 02/10/2025    CREATININE 0.87 02/10/2025    CREATININE 1.25 (H) 01/28/2025    CREATININE 1.07 (H) 08/29/2024    EGFRIFNONA 67 09/10/2021       Lab Results   Component Value Date    HGBA1C 5.20 09/10/2021    TSH 1.500 03/13/2024    FREET4 1.6 05/08/2020       Lab Results   Component Value Date     (H) 02/26/2025    HDL 50 02/26/2025    TRIG 385 (H) 02/26/2025    CHOLHDLRATIO 4.8 05/08/2020       Lab Results   Component Value Date    AHWT38PO 47.2 02/26/2025        Lab Results   Component Value Date    WBC 9.23 02/10/2025    HGB 13.5 02/10/2025    MCV 90.3 02/10/2025     02/10/2025       Lab Results   Component Value Date    GLUCOSEU Negative 01/28/2025    BLOODU Moderate (2+) (A) 01/28/2025    NITRITEU Negative 01/28/2025    LEUKOCYTESUR Trace (A) 02/26/2025          Lab Results   Component Value Date    HEPCVIRUSABY Non-Reactive 08/29/2023           ASSESSMENT & PLAN     Diagnoses and all orders for this visit:    1. Right upper quadrant abdominal pain (Primary)  -     Hepatic Function Panel  -     CBC & Differential  -     Lipase    2. Hepatomegaly  -     Hepatic Function Panel  -     CBC & Differential  -     Lipase    3. Fatty liver disease, nonalcoholic  -     Hepatic Function Panel  -     CBC & Differential    4. Pruritus  -     Ambulatory Referral to Dermatology  -     hydrocortisone 2.5 % cream 1 Application        Assessment & Plan  1. Shortness of breath.  - Reports experiencing shortness of breath even with minimal exertion and sometimes at rest.  - History of using inhalers and was previously prescribed prednisone.  - A new inhaler will be prescribed for use 2 puffs every 4-6 hours for cough, wheezing, and  shortness of breath.  - Lab work, including a CBC, liver enzymes, and lipase, will be conducted today to rule out any acute process or infection. If symptoms persist, follow-up with pulmonologist is advised.    2. Fatty liver.  - Known diagnosis of fatty liver, currently inflamed.  - Physical exam reveals a little bit of swelling on the side.  - Advised to contact gastroenterologist to expedite appointment due to symptomatic condition.  - Lab work will include checking liver enzymes to monitor the condition.    3. Facial itching.  - Reports persistent facial itching and has been using over-the-counter Benadryl cream without significant relief.  - Prescription for a different topical cream will be provided.  - Referral to a dermatologist for further evaluation.    4. Health maintenance.  - Had a colonoscopy in 02/2025 and is advised to follow up every 5 years due to a history of polyps.  - Received a pneumonia vaccine.    Follow-up  - Follow up in 6 weeks.         HEALTHCARE MAINTENANCE ISSUES     Cancer Screening:  Colon: Initial/Next screening at age: CURRENT  Repeat colon cancer screening: every 5 years  Breast: Recommended monthly self exams; annual professional exam  Mammogram: every 3 years  Cervical: 2 years  Skin: Monthly self skin examination, annual exam by health professional      Lifestyle Counseling:  Lifestyle Modifications: Continue good lifestyle choices/modifications, Continue to abstain/curtail drinking, and Continue to abstain from smoking  Safety Issues: Always wear seatbelt, Avoid texting while driving   Use sunscreen, regular skin examination  Recommended annual dental/vision examination.  Emotional/Stress/Sleep: Reviewed and  given when appropriate    Part of this note may be electronic transcription/translation of spoken language to printed text using the Dragon dictation system      Patient or patient representative verbalized consent for the use of Ambient Listening during the visit  with  LASHANDA Urban for chart documentation. 4/16/2025  12:52 EDT

## 2025-04-17 DIAGNOSIS — R10.11 RIGHT UPPER QUADRANT ABDOMINAL PAIN: ICD-10-CM

## 2025-04-17 DIAGNOSIS — R74.8 ELEVATED LIPASE: Primary | ICD-10-CM

## 2025-04-23 ENCOUNTER — CLINICAL SUPPORT (OUTPATIENT)
Dept: FAMILY MEDICINE CLINIC | Facility: CLINIC | Age: 60
End: 2025-04-23
Payer: OTHER GOVERNMENT

## 2025-04-23 DIAGNOSIS — R10.11 RIGHT UPPER QUADRANT ABDOMINAL PAIN: ICD-10-CM

## 2025-04-23 DIAGNOSIS — R74.8 ELEVATED LIPASE: ICD-10-CM

## 2025-04-23 LAB
ALBUMIN SERPL-MCNC: 4.6 G/DL (ref 3.5–5.2)
ALP SERPL-CCNC: 99 U/L (ref 39–117)
ALT SERPL W P-5'-P-CCNC: 16 U/L (ref 1–33)
AST SERPL-CCNC: 24 U/L (ref 1–32)
BASOPHILS # BLD AUTO: 0.05 10*3/MM3 (ref 0–0.2)
BASOPHILS NFR BLD AUTO: 0.7 % (ref 0–1.5)
BILIRUB CONJ SERPL-MCNC: <0.1 MG/DL (ref 0–0.3)
BILIRUB INDIRECT SERPL-MCNC: NORMAL MG/DL
BILIRUB SERPL-MCNC: <0.2 MG/DL (ref 0–1.2)
DEPRECATED RDW RBC AUTO: 42.7 FL (ref 37–54)
EOSINOPHIL # BLD AUTO: 0.09 10*3/MM3 (ref 0–0.4)
EOSINOPHIL NFR BLD AUTO: 1.3 % (ref 0.3–6.2)
ERYTHROCYTE [DISTWIDTH] IN BLOOD BY AUTOMATED COUNT: 13 % (ref 12.3–15.4)
HCT VFR BLD AUTO: 39.7 % (ref 34–46.6)
HGB BLD-MCNC: 13.6 G/DL (ref 12–15.9)
IMM GRANULOCYTES # BLD AUTO: 0.01 10*3/MM3 (ref 0–0.05)
IMM GRANULOCYTES NFR BLD AUTO: 0.1 % (ref 0–0.5)
LIPASE SERPL-CCNC: 48 U/L (ref 13–60)
LYMPHOCYTES # BLD AUTO: 3.26 10*3/MM3 (ref 0.7–3.1)
LYMPHOCYTES NFR BLD AUTO: 48.7 % (ref 19.6–45.3)
MCH RBC QN AUTO: 31.1 PG (ref 26.6–33)
MCHC RBC AUTO-ENTMCNC: 34.3 G/DL (ref 31.5–35.7)
MCV RBC AUTO: 90.8 FL (ref 79–97)
MONOCYTES # BLD AUTO: 0.45 10*3/MM3 (ref 0.1–0.9)
MONOCYTES NFR BLD AUTO: 6.7 % (ref 5–12)
NEUTROPHILS NFR BLD AUTO: 2.83 10*3/MM3 (ref 1.7–7)
NEUTROPHILS NFR BLD AUTO: 42.5 % (ref 42.7–76)
NRBC BLD AUTO-RTO: 0 /100 WBC (ref 0–0.2)
PLATELET # BLD AUTO: 332 10*3/MM3 (ref 140–450)
PMV BLD AUTO: 10.7 FL (ref 6–12)
PROT SERPL-MCNC: 7.5 G/DL (ref 6–8.5)
RBC # BLD AUTO: 4.37 10*6/MM3 (ref 3.77–5.28)
WBC NRBC COR # BLD AUTO: 6.69 10*3/MM3 (ref 3.4–10.8)

## 2025-04-23 PROCEDURE — 83690 ASSAY OF LIPASE: CPT | Performed by: NURSE PRACTITIONER

## 2025-04-23 PROCEDURE — 36415 COLL VENOUS BLD VENIPUNCTURE: CPT | Performed by: NURSE PRACTITIONER

## 2025-04-23 PROCEDURE — 85025 COMPLETE CBC W/AUTO DIFF WBC: CPT | Performed by: NURSE PRACTITIONER

## 2025-04-23 PROCEDURE — 80076 HEPATIC FUNCTION PANEL: CPT | Performed by: NURSE PRACTITIONER

## 2025-04-23 RX ORDER — LIDOCAINE 50 MG/G
1 PATCH TOPICAL EVERY 24 HOURS
Qty: 30 PATCH | Refills: 2 | Status: SHIPPED | OUTPATIENT
Start: 2025-04-23

## 2025-04-23 NOTE — TELEPHONE ENCOUNTER
Pt came in and stated her medication had not been sent to the pharmacy yet, can you please resend in medication? Thank you

## 2025-05-07 ENCOUNTER — TRANSCRIBE ORDERS (OUTPATIENT)
Dept: LAB | Facility: HOSPITAL | Age: 60
End: 2025-05-07
Payer: OTHER GOVERNMENT

## 2025-05-07 ENCOUNTER — LAB (OUTPATIENT)
Dept: LAB | Facility: HOSPITAL | Age: 60
End: 2025-05-07
Payer: OTHER GOVERNMENT

## 2025-05-07 ENCOUNTER — HOSPITAL ENCOUNTER (OUTPATIENT)
Dept: CT IMAGING | Facility: HOSPITAL | Age: 60
Discharge: HOME | End: 2025-05-07
Payer: OTHER GOVERNMENT

## 2025-05-07 ENCOUNTER — OFFICE VISIT (OUTPATIENT)
Dept: FAMILY MEDICINE CLINIC | Facility: CLINIC | Age: 60
End: 2025-05-07
Payer: OTHER GOVERNMENT

## 2025-05-07 VITALS
HEIGHT: 66 IN | BODY MASS INDEX: 25.71 KG/M2 | TEMPERATURE: 98 F | HEART RATE: 79 BPM | WEIGHT: 160 LBS | DIASTOLIC BLOOD PRESSURE: 90 MMHG | SYSTOLIC BLOOD PRESSURE: 144 MMHG | OXYGEN SATURATION: 100 %

## 2025-05-07 DIAGNOSIS — L98.9 FACIAL LESION: ICD-10-CM

## 2025-05-07 DIAGNOSIS — R06.02 SOB (SHORTNESS OF BREATH): ICD-10-CM

## 2025-05-07 DIAGNOSIS — Z83.3 FAMILY HISTORY OF DIABETES MELLITUS: ICD-10-CM

## 2025-05-07 DIAGNOSIS — N18.2 CHRONIC KIDNEY DISEASE, STAGE II (MILD): ICD-10-CM

## 2025-05-07 DIAGNOSIS — R74.8 ELEVATED LIPASE: ICD-10-CM

## 2025-05-07 DIAGNOSIS — R14.0 BLOATING: ICD-10-CM

## 2025-05-07 DIAGNOSIS — I70.1 ATHEROSCLEROSIS OF RENAL ARTERY: Primary | ICD-10-CM

## 2025-05-07 DIAGNOSIS — R10.11 RIGHT UPPER QUADRANT ABDOMINAL PAIN: ICD-10-CM

## 2025-05-07 DIAGNOSIS — R16.0 HEPATOMEGALY: ICD-10-CM

## 2025-05-07 DIAGNOSIS — M54.50 LOW BACK PAIN WITHOUT SCIATICA, UNSPECIFIED BACK PAIN LATERALITY, UNSPECIFIED CHRONICITY: Primary | ICD-10-CM

## 2025-05-07 LAB
ALBUMIN SERPL-MCNC: 4.7 G/DL (ref 3.5–5.2)
ALBUMIN/GLOB SERPL: 1.5 G/DL
ALP SERPL-CCNC: 106 U/L (ref 39–117)
ALT SERPL W P-5'-P-CCNC: 17 U/L (ref 1–33)
ANION GAP SERPL CALCULATED.3IONS-SCNC: 13 MMOL/L (ref 5–15)
AST SERPL-CCNC: 17 U/L (ref 1–32)
BASOPHILS # BLD AUTO: 0.07 10*3/MM3 (ref 0–0.2)
BASOPHILS NFR BLD AUTO: 0.9 % (ref 0–1.5)
BILIRUB BLD-MCNC: NEGATIVE MG/DL
BILIRUB SERPL-MCNC: 0.2 MG/DL (ref 0–1.2)
BUN SERPL-MCNC: 13 MG/DL (ref 6–20)
BUN/CREAT SERPL: 12.6 (ref 7–25)
CALCIUM SPEC-SCNC: 10.3 MG/DL (ref 8.6–10.5)
CHLORIDE SERPL-SCNC: 102 MMOL/L (ref 98–107)
CLARITY, POC: CLEAR
CO2 SERPL-SCNC: 26 MMOL/L (ref 22–29)
COLOR UR: YELLOW
CREAT SERPL-MCNC: 1.03 MG/DL (ref 0.57–1)
DEPRECATED RDW RBC AUTO: 42.2 FL (ref 37–54)
EGFRCR SERPLBLD CKD-EPI 2021: 63.2 ML/MIN/1.73
EOSINOPHIL # BLD AUTO: 0.06 10*3/MM3 (ref 0–0.4)
EOSINOPHIL NFR BLD AUTO: 0.8 % (ref 0.3–6.2)
ERYTHROCYTE [DISTWIDTH] IN BLOOD BY AUTOMATED COUNT: 13 % (ref 12.3–15.4)
GLOBULIN UR ELPH-MCNC: 3.2 GM/DL
GLUCOSE SERPL-MCNC: 91 MG/DL (ref 65–99)
GLUCOSE UR STRIP-MCNC: NEGATIVE MG/DL
HBA1C MFR BLD: 5.6 % (ref 4.8–5.6)
HCT VFR BLD AUTO: 41.5 % (ref 34–46.6)
HGB BLD-MCNC: 13.6 G/DL (ref 12–15.9)
IMM GRANULOCYTES # BLD AUTO: 0.01 10*3/MM3 (ref 0–0.05)
IMM GRANULOCYTES NFR BLD AUTO: 0.1 % (ref 0–0.5)
KETONES UR QL: NEGATIVE
LEUKOCYTE EST, POC: NEGATIVE
LIPASE SERPL-CCNC: 41 U/L (ref 13–60)
LYMPHOCYTES # BLD AUTO: 3.77 10*3/MM3 (ref 0.7–3.1)
LYMPHOCYTES NFR BLD AUTO: 47.7 % (ref 19.6–45.3)
MCH RBC QN AUTO: 29.7 PG (ref 26.6–33)
MCHC RBC AUTO-ENTMCNC: 32.8 G/DL (ref 31.5–35.7)
MCV RBC AUTO: 90.6 FL (ref 79–97)
MONOCYTES # BLD AUTO: 0.51 10*3/MM3 (ref 0.1–0.9)
MONOCYTES NFR BLD AUTO: 6.4 % (ref 5–12)
NEUTROPHILS NFR BLD AUTO: 3.49 10*3/MM3 (ref 1.7–7)
NEUTROPHILS NFR BLD AUTO: 44.1 % (ref 42.7–76)
NITRITE UR-MCNC: NEGATIVE MG/ML
NRBC BLD AUTO-RTO: 0 /100 WBC (ref 0–0.2)
PH UR: 7 [PH] (ref 5–8)
PLATELET # BLD AUTO: 364 10*3/MM3 (ref 140–450)
PMV BLD AUTO: 10.1 FL (ref 6–12)
POTASSIUM SERPL-SCNC: 4.5 MMOL/L (ref 3.5–5.2)
PROT SERPL-MCNC: 7.9 G/DL (ref 6–8.5)
PROT UR STRIP-MCNC: NEGATIVE MG/DL
RBC # BLD AUTO: 4.58 10*6/MM3 (ref 3.77–5.28)
RBC # UR STRIP: NEGATIVE /UL
SODIUM SERPL-SCNC: 141 MMOL/L (ref 136–145)
SP GR UR: 1.01 (ref 1–1.03)
UROBILINOGEN UR QL: NORMAL
WBC NRBC COR # BLD AUTO: 7.91 10*3/MM3 (ref 3.4–10.8)

## 2025-05-07 PROCEDURE — 99214 OFFICE O/P EST MOD 30 MIN: CPT | Performed by: NURSE PRACTITIONER

## 2025-05-07 PROCEDURE — 36415 COLL VENOUS BLD VENIPUNCTURE: CPT

## 2025-05-07 PROCEDURE — 25510000001 IOPAMIDOL PER 1 ML: Performed by: NURSE PRACTITIONER

## 2025-05-07 PROCEDURE — 83690 ASSAY OF LIPASE: CPT

## 2025-05-07 PROCEDURE — 71260 CT THORAX DX C+: CPT

## 2025-05-07 PROCEDURE — 81002 URINALYSIS NONAUTO W/O SCOPE: CPT | Performed by: NURSE PRACTITIONER

## 2025-05-07 PROCEDURE — 83036 HEMOGLOBIN GLYCOSYLATED A1C: CPT

## 2025-05-07 PROCEDURE — 80053 COMPREHEN METABOLIC PANEL: CPT

## 2025-05-07 PROCEDURE — 85025 COMPLETE CBC W/AUTO DIFF WBC: CPT

## 2025-05-07 PROCEDURE — 74177 CT ABD & PELVIS W/CONTRAST: CPT

## 2025-05-07 RX ORDER — IOPAMIDOL 755 MG/ML
100 INJECTION, SOLUTION INTRAVASCULAR
Status: COMPLETED | OUTPATIENT
Start: 2025-05-07 | End: 2025-05-07

## 2025-05-07 RX ORDER — DESONIDE 0.5 MG/G
1 CREAM TOPICAL 2 TIMES DAILY
Qty: 30 G | Refills: 1 | Status: SHIPPED | OUTPATIENT
Start: 2025-05-07

## 2025-05-07 RX ORDER — HYOSCYAMINE SULFATE 0.12 MG/1
0.12 TABLET SUBLINGUAL EVERY 4 HOURS PRN
Qty: 30 TABLET | Refills: 0 | Status: SHIPPED | OUTPATIENT
Start: 2025-05-07

## 2025-05-07 RX ADMIN — IOPAMIDOL 100 ML: 755 INJECTION, SOLUTION INTRAVENOUS at 15:56

## 2025-05-07 NOTE — PROGRESS NOTES
Chief Complaint  Abdominal Pain, Back Pain, Bloated, and Shortness of Breath    Subjective        Medical History: has a past medical history of Acid reflux, Anxiety, Arthritis, Depression, Disease of thyroid gland, Gout attack, Hypertension, Hypothyroidism, Kidney disease, chronic, stage II (GFR 60-89 ml/min), Macromastia, OAB (overactive bladder), PONV (postoperative nausea and vomiting), Renal artery stenosis, SOB (shortness of breath) on exertion, and Stress incontinence.     Surgical History: has a past surgical history that includes Appendectomy (1983); Lymph node biopsy (Left, 2001); Cholecystectomy (2012); Laparoscopic tubal ligation (Bilateral, 1994); Abdominoplasty (2016); Knee arthroscopy (Right); Dental surgery (2008); Laparoscopy (Bilateral, 10/17/2016); Colonoscopy (2013); Laparoscopy (Right, 05/07/2018); Esophagogastroduodenoscopy (2013); Blepharoplasty (Bilateral, 12/15/2022); Head/Neck Lesion/Cyst Excision (Left, 12/15/2022); Blepharoplasty (Bilateral, 12/15/2022); Cardiac catheterization (N/A, 03/06/2023); transvaginal taping suspension (N/A, 06/03/2024); and Breast Reduction (Bilateral, 09/20/2024).     Family History: family history includes Colon cancer in her maternal grandfather.     Social History: reports that she quit smoking about 12 months ago. Her smoking use included cigarettes. She started smoking about 40 years ago. She has a 58.9 pack-year smoking history. She has never used smokeless tobacco. She reports that she does not currently use alcohol. She reports that she does not use drugs.    Waleska Shah presents to [facility]    History of Present Illness    History of Present Illness  The patient presents for evaluation of abdominal pain, shortness of breath, cough, and itching.    She reports persistent abdominal discomfort, describing it as a pulling sensation on her sides. She experiences bloating and a sensation of upward pressure in her  abdomen. She has been experiencing a loss of appetite since her last emergency room visit, which has led to a decrease in her daily food intake to one meal. She also reports occasional constipation and irregular bowel movements, with her most recent stool being yellow and resembling spider webs. She has not eaten today, only consuming coffee with cream at 8:00 AM. She has a scheduled appointment with her nephrologist next Wednesday and a follow-up with her gastroenterologist in 07/2025. During her last emergency room visit, she underwent a CT scan and ultrasound, which revealed a kidney infection. She has previously attempted to undergo a gastric emptying test but was unable to tolerate the barium contrast. She manages her pain by adopting certain positions, taking hot baths, and occasionally using over-the-counter sleep aids. She has previously used tramadol and hydrocodone for pain management but discontinued them due to gastrointestinal side effects. She also uses pain patches for back pain relief.    She reports experiencing shortness of breath at rest, which prompted her recent emergency room visit. She was prescribed medication for this symptom but experienced vomiting upon returning home. She also reports a persistent cough, for which she was prescribed heartburn medication, despite not having heartburn. She has no history of blood clots but reports occasional leg swelling and heaviness, which sometimes leads to falls.    She has an itchy skin condition on her face and has been using over-the-counter creams for relief.    She was diagnosed with influenza during her last emergency room visit and was treated accordingly.    PAST SURGICAL HISTORY:  Colonoscopy with polyp removal.    SOCIAL HISTORY  She quit smoking.    FAMILY HISTORY  Her father was a heavy diabetic and required insulin. Her youngest child had low blood sugar.      Objective   Vital Signs:   /90   Pulse 79   Temp 98 °F (36.7 °C)   Ht  "167.6 cm (66\")   Wt 72.6 kg (160 lb)   SpO2 100%   BMI 25.82 kg/m²       Wt Readings from Last 3 Encounters:   05/07/25 72.6 kg (160 lb)   04/16/25 73.5 kg (162 lb)   02/26/25 72.1 kg (159 lb)        BP Readings from Last 3 Encounters:   05/07/25 144/90   04/16/25 128/80   02/26/25 138/86                Physical Exam  Vitals reviewed.   Constitutional:       Appearance: Normal appearance. She is well-developed.   HENT:      Head: Normocephalic and atraumatic.   Eyes:      Conjunctiva/sclera: Conjunctivae normal.      Pupils: Pupils are equal, round, and reactive to light.   Cardiovascular:      Rate and Rhythm: Normal rate and regular rhythm.      Heart sounds: No murmur heard.  Pulmonary:      Effort: Pulmonary effort is normal.      Breath sounds: Normal breath sounds. No wheezing.   Chest:      Chest wall: No tenderness.   Abdominal:      General: There is distension.      Tenderness: There is abdominal tenderness. There is no right CVA tenderness, left CVA tenderness, guarding or rebound.   Skin:     General: Skin is warm and dry.   Neurological:      Mental Status: She is alert and oriented to person, place, and time.   Psychiatric:         Mood and Affect: Mood and affect normal.         Behavior: Behavior normal.         Thought Content: Thought content normal.         Judgment: Judgment normal.      Result Review :  The following data was reviewed by LASHANDA Urban on 05/07/2025.  Common labs          2/26/2025    11:50 4/16/2025    13:46 4/23/2025    10:12   Common Labs   Albumin  4.6  4.6    Total Bilirubin  <0.2  <0.2    Alkaline Phosphatase  101  99    AST (SGOT)  26  24    ALT (SGPT)  16  16    WBC   6.69    Hemoglobin   13.6    Hematocrit   39.7    Platelets   332    Total Cholesterol 265      Triglycerides 385      HDL Cholesterol 50      LDL Cholesterol  144        Data reviewed : Previous office note           Brief Urine Lab Results  (Last result in the past 365 days)        Color   " Clarity   Blood   Leuk Est   Nitrite   Protein   CREAT   Urine HCG        05/07/25 0952 Yellow   Clear   Negative   Negative   Negative   Negative                  Current Outpatient Medications on File Prior to Visit   Medication Sig Dispense Refill   • allopurinol (ZYLOPRIM) 300 MG tablet Take 1 tablet by mouth Daily. 90 tablet 1   • clonazePAM (KlonoPIN) 0.5 MG tablet Take 0.5-1 tablets by mouth 2 (Two) Times a Day As Needed. (Patient taking differently: Take 0.5-1 tablets by mouth 2 (Two) Times a Day As Needed for Anxiety.)     • Cyanocobalamin (B-12 PO) Take 1 tablet by mouth Daily. PT HOLDING FOR SURGERY     • Diclofenac Sodium (VOLTAREN) 1 % gel gel Apply 4 g topically to the appropriate area as directed 4 (Four) Times a Day. 100 g 5   • DULoxetine (CYMBALTA) 20 MG capsule Take 1 capsule by mouth Every Night. (Patient taking differently: Take 1 capsule by mouth Every Night.) 90 capsule 1   • Evolocumab (REPATHA) solution auto-injector SureClick injection Inject 1 mL under the skin into the appropriate area as directed Every 14 (Fourteen) Days. 6 mL 1   • ezetimibe (Zetia) 10 MG tablet Take 1 tablet by mouth Daily. 90 tablet 1   • fluticasone (FLONASE) 50 MCG/ACT nasal spray 2 sprays into the nostril(s) as directed by provider Daily. 16 g 1   • HYDROcodone-acetaminophen (NORCO) 5-325 MG per tablet Take 1 tablet by mouth Every 4 (Four) to 6 (Six) Hours As Needed for Pain.     • lidocaine (Lidoderm) 5 % Place 1 patch on the skin as directed by provider Daily. Remove & Discard patch within 12 hours or as directed by MD 30 patch 2   • Scopolamine 1 MG/3DAYS patch Place 1 patch on the skin as directed by provider Every 72 (Seventy-Two) Hours. 1 each 0   • traMADol-acetaminophen (Ultracet) 37.5-325 MG per tablet Take 1 tablet by mouth Every 6 (Six) Hours As Needed for Moderate Pain. 60 tablet 1   • vitamin D (ERGOCALCIFEROL) 1.25 MG (89190 UT) capsule capsule Take 1 capsule by mouth Every 7 (Seven) Days. WEDNESDAYS  13 capsule 1   • ketorolac (TORADOL) 10 MG tablet Take 1 tablet by mouth Every 6 (Six) Hours As Needed for Moderate Pain. 15 tablet 0     Current Facility-Administered Medications on File Prior to Visit   Medication Dose Route Frequency Provider Last Rate Last Admin   • hydrocortisone 2.5 % cream 1 Application  1 Application Topical Q12H PhoebeErick LASHANDA Mendez       • ondansetron ODT (ZOFRAN-ODT) disintegrating tablet 4 mg  4 mg Translingual Q6H PRN Tracey Fields MD            Assessment and Plan  Diagnoses and all orders for this visit:    1. Low back pain without sciatica, unspecified back pain laterality, unspecified chronicity (Primary)  -     POCT urinalysis dipstick, manual    2. Bloating  -     CT Abdomen Pelvis With & Without Contrast; Future  -     CT Chest With Contrast; Future  -     Lipase; Future  -     Comprehensive metabolic panel; Future  -     CBC & Differential; Future  -     CT Abdomen Pelvis With & Without Contrast; Future    3. Elevated lipase  -     CT Abdomen Pelvis With & Without Contrast; Future  -     CT Chest With Contrast; Future  -     Lipase; Future  -     Comprehensive metabolic panel; Future  -     CBC & Differential; Future  -     CT Abdomen Pelvis With & Without Contrast; Future    4. Right upper quadrant abdominal pain  -     CT Abdomen Pelvis With & Without Contrast; Future  -     CT Chest With Contrast; Future  -     Lipase; Future  -     Comprehensive metabolic panel; Future  -     CBC & Differential; Future  -     CT Abdomen Pelvis With & Without Contrast; Future    5. Hepatomegaly  -     CT Abdomen Pelvis With & Without Contrast; Future  -     CT Chest With Contrast; Future  -     Lipase; Future  -     Comprehensive metabolic panel; Future  -     CBC & Differential; Future  -     CT Abdomen Pelvis With & Without Contrast; Future    6. SOB (shortness of breath)  -     CT Abdomen Pelvis With & Without Contrast; Future  -     CT Chest With Contrast; Future  -      Lipase; Future  -     Comprehensive metabolic panel; Future  -     CBC & Differential; Future    7. Family history of diabetes mellitus  -     Hemoglobin A1c; Future    8. Facial lesion    Other orders  -     desonide (DesOwen) 0.05 % cream; Apply 1 Application topically to the appropriate area as directed 2 (Two) Times a Day.  Dispense: 30 g; Refill: 1  -     hyoscyamine (LEVSIN) 0.125 MG SL tablet; Place 1 tablet under the tongue Every 4 (Four) Hours As Needed for Cramping.  Dispense: 30 tablet; Refill: 0        Assessment & Plan  1. Abdominal pain.  - Reports persistent abdominal pain, primarily in the right upper quadrant, along with bloating and a sensation of fullness.  - Physical examination reveals tenderness in the right upper quadrant.  - A repeat CT scan of the chest, abdomen, and pelvis will be conducted today to further investigate the cause of her symptoms. Laboratory tests will be ordered to monitor her pancreatic enzymes and hemoglobin A1c levels.  - If the CT scan and lab results are inconclusive, further diagnostic tests will be considered.    2. Shortness of breath.  - Reports new onset of shortness of breath, which prompted her recent visit to the emergency room.  - Physical examination includes checking heart and lungs, and no new cough but new shortness of breath noted.  - A repeat CT scan of the chest will be performed today to evaluate for any underlying causes.  - If the CT scan is normal, other potential causes such as anxiety or deconditioning will be considered.    3. Cough.  - Continues to experience a persistent cough.  - Previous treatments for heartburn did not alleviate the cough.  - The CT scan of the chest will help rule out any respiratory causes.  - If the cough persists, further evaluation and alternative treatments will be considered.    4. Itching.  - Reports itching on her face and has been using over-the-counter creams.  - Physical examination reveals itching on the face.  -  A low-dose steroid cream will be prescribed to be applied a couple of times a day to see if it alleviates the itching.  - If the itching persists, a referral to dermatology will be considered.      Follow Up   Return for If symptoms do not improve new concerning symptoms.  Patient was given instructions and counseling regarding her condition or for health maintenance advice. Please see specific information pulled into the AVS if appropriate.       Part of this note may be electronic transcription/translation of spoken language to printed text using the Dragon dictation system    Patient or patient representative verbalized consent for the use of Ambient Listening during the visit with  LASHANDA Urban for chart documentation. 5/7/2025  10:14 EDT

## 2025-05-12 ENCOUNTER — LAB (OUTPATIENT)
Dept: LAB | Facility: HOSPITAL | Age: 60
End: 2025-05-12
Payer: OTHER GOVERNMENT

## 2025-05-12 DIAGNOSIS — I70.1 ATHEROSCLEROSIS OF RENAL ARTERY: ICD-10-CM

## 2025-05-12 DIAGNOSIS — N18.2 CHRONIC KIDNEY DISEASE, STAGE II (MILD): ICD-10-CM

## 2025-05-12 LAB
ALBUMIN SERPL-MCNC: 4.6 G/DL (ref 3.5–5.2)
ANION GAP SERPL CALCULATED.3IONS-SCNC: 9 MMOL/L (ref 5–15)
BASOPHILS # BLD AUTO: 0.06 10*3/MM3 (ref 0–0.2)
BASOPHILS NFR BLD AUTO: 0.9 % (ref 0–1.5)
BUN SERPL-MCNC: 12 MG/DL (ref 6–20)
BUN/CREAT SERPL: 13.2 (ref 7–25)
CALCIUM SPEC-SCNC: 10 MG/DL (ref 8.6–10.5)
CHLORIDE SERPL-SCNC: 105 MMOL/L (ref 98–107)
CO2 SERPL-SCNC: 27 MMOL/L (ref 22–29)
CREAT SERPL-MCNC: 0.91 MG/DL (ref 0.57–1)
DEPRECATED RDW RBC AUTO: 44.9 FL (ref 37–54)
EGFRCR SERPLBLD CKD-EPI 2021: 73.3 ML/MIN/1.73
EOSINOPHIL # BLD AUTO: 0.06 10*3/MM3 (ref 0–0.4)
EOSINOPHIL NFR BLD AUTO: 0.9 % (ref 0.3–6.2)
ERYTHROCYTE [DISTWIDTH] IN BLOOD BY AUTOMATED COUNT: 13.1 % (ref 12.3–15.4)
GLUCOSE SERPL-MCNC: 91 MG/DL (ref 65–99)
HCT VFR BLD AUTO: 41.1 % (ref 34–46.6)
HGB BLD-MCNC: 13.5 G/DL (ref 12–15.9)
IMM GRANULOCYTES # BLD AUTO: 0 10*3/MM3 (ref 0–0.05)
IMM GRANULOCYTES NFR BLD AUTO: 0 % (ref 0–0.5)
LYMPHOCYTES # BLD AUTO: 3.38 10*3/MM3 (ref 0.7–3.1)
LYMPHOCYTES NFR BLD AUTO: 50.6 % (ref 19.6–45.3)
MCH RBC QN AUTO: 31 PG (ref 26.6–33)
MCHC RBC AUTO-ENTMCNC: 32.8 G/DL (ref 31.5–35.7)
MCV RBC AUTO: 94.3 FL (ref 79–97)
MONOCYTES # BLD AUTO: 0.48 10*3/MM3 (ref 0.1–0.9)
MONOCYTES NFR BLD AUTO: 7.2 % (ref 5–12)
NEUTROPHILS NFR BLD AUTO: 2.7 10*3/MM3 (ref 1.7–7)
NEUTROPHILS NFR BLD AUTO: 40.4 % (ref 42.7–76)
NRBC BLD AUTO-RTO: 0 /100 WBC (ref 0–0.2)
PHOSPHATE SERPL-MCNC: 3.2 MG/DL (ref 2.5–4.5)
PLATELET # BLD AUTO: 331 10*3/MM3 (ref 140–450)
PMV BLD AUTO: 10.6 FL (ref 6–12)
POTASSIUM SERPL-SCNC: 4.8 MMOL/L (ref 3.5–5.2)
RBC # BLD AUTO: 4.36 10*6/MM3 (ref 3.77–5.28)
SODIUM SERPL-SCNC: 141 MMOL/L (ref 136–145)
WBC NRBC COR # BLD AUTO: 6.68 10*3/MM3 (ref 3.4–10.8)

## 2025-05-12 PROCEDURE — 85025 COMPLETE CBC W/AUTO DIFF WBC: CPT

## 2025-05-12 PROCEDURE — 80069 RENAL FUNCTION PANEL: CPT

## 2025-05-12 PROCEDURE — 36415 COLL VENOUS BLD VENIPUNCTURE: CPT

## 2025-05-13 ENCOUNTER — LAB (OUTPATIENT)
Dept: LAB | Facility: HOSPITAL | Age: 60
End: 2025-05-13
Payer: OTHER GOVERNMENT

## 2025-05-13 DIAGNOSIS — I70.1 ATHEROSCLEROSIS OF RENAL ARTERY: ICD-10-CM

## 2025-05-13 DIAGNOSIS — N18.2 CHRONIC KIDNEY DISEASE, STAGE II (MILD): ICD-10-CM

## 2025-05-13 LAB
BACTERIA UR QL AUTO: ABNORMAL /HPF
BILIRUB UR QL STRIP: NEGATIVE
CLARITY UR: CLEAR
COLOR UR: YELLOW
CREAT UR-MCNC: 121.1 MG/DL
GLUCOSE UR STRIP-MCNC: NEGATIVE MG/DL
HGB UR QL STRIP.AUTO: ABNORMAL
HYALINE CASTS UR QL AUTO: ABNORMAL /LPF
KETONES UR QL STRIP: NEGATIVE
LEUKOCYTE ESTERASE UR QL STRIP.AUTO: ABNORMAL
NITRITE UR QL STRIP: NEGATIVE
PH UR STRIP.AUTO: 7.5 [PH] (ref 5–8)
PROT ?TM UR-MCNC: 9 MG/DL
PROT UR QL STRIP: NEGATIVE
PROT/CREAT UR: 0.07 MG/G{CREAT}
RBC # UR STRIP: ABNORMAL /HPF
REF LAB TEST METHOD: ABNORMAL
SP GR UR STRIP: 1.01 (ref 1–1.03)
SQUAMOUS #/AREA URNS HPF: ABNORMAL /HPF
UROBILINOGEN UR QL STRIP: ABNORMAL
WBC # UR STRIP: ABNORMAL /HPF

## 2025-05-13 PROCEDURE — 84156 ASSAY OF PROTEIN URINE: CPT

## 2025-05-13 PROCEDURE — 81001 URINALYSIS AUTO W/SCOPE: CPT

## 2025-05-13 PROCEDURE — 82570 ASSAY OF URINE CREATININE: CPT

## 2025-06-17 ENCOUNTER — OFFICE VISIT (OUTPATIENT)
Dept: SURGERY | Facility: CLINIC | Age: 60
End: 2025-06-17
Payer: OTHER GOVERNMENT

## 2025-06-17 VITALS
HEART RATE: 77 BPM | WEIGHT: 156.4 LBS | DIASTOLIC BLOOD PRESSURE: 80 MMHG | HEIGHT: 66 IN | SYSTOLIC BLOOD PRESSURE: 132 MMHG | BODY MASS INDEX: 25.13 KG/M2 | OXYGEN SATURATION: 98 %

## 2025-06-17 DIAGNOSIS — R10.11 RIGHT UPPER QUADRANT ABDOMINAL PAIN: Primary | ICD-10-CM

## 2025-06-17 PROCEDURE — 99203 OFFICE O/P NEW LOW 30 MIN: CPT | Performed by: SURGERY

## 2025-06-17 RX ORDER — SUCRALFATE 1 G/1
1 TABLET ORAL 3 TIMES DAILY
Qty: 90 TABLET | Refills: 1 | Status: SHIPPED | OUTPATIENT
Start: 2025-06-17 | End: 2025-08-16

## 2025-06-17 RX ORDER — PANTOPRAZOLE SODIUM 40 MG/1
40 TABLET, DELAYED RELEASE ORAL DAILY
Qty: 30 TABLET | Refills: 1 | Status: SHIPPED | OUTPATIENT
Start: 2025-06-17 | End: 2026-06-17

## 2025-06-18 NOTE — PROGRESS NOTES
CC: Abdominal pain     HPI: Patient is a very pleasant 59-year-old female that was referred by Dr. Bell for evaluation of right upper quadrant abdominal pain.  The patient reports that for the past year she has been feeling right upper quadrant abdominal pain that can happen anytime of the day and most of the time is present.  He has been associated with abdominal bloating, burping, swelling of the right side of the abdominal wall.  There is radiation to the right flank and back.  There has not been associated any nausea or vomiting.  She denies any fevers or chills.  Patient has history of cholecystectomy.  She denies any heartburn or regurgitation.  Denies any recent change in bowel habits.     PMH: Colon polyps, acid reflux, anxiety, arthritis, depression, gout, hypertension, hypothyroidism, chronic kidney disease stage II, postoperative nausea and vomiting, renal artery stenosis, stress incontinence     PSH:   - Right knee arthroscopy  - Appendectomy 1983  - Bilateral laparoscopic tubal ligation 1984  - Laparoscopic cholecystectomy 2012                                                                                                                                                                                   -Colonoscopy and upper endoscopy 2013  -Diagnosed laparoscopy 2016  - Diagnostic laparoscopy 2018  - Transvaginal bladder suspension 2024  - Bilateral breast reduction 2024  - Colonoscopy 3/2025    ALL: Amoxicillin causes itching  Hydrocodone causes GI intolerance  Ibuprofen causes nausea and vomiting  Percocet causes itching and rash  Tylenol with codeine causes nausea and vomiting  Statin cause myalgia    FH and SH:   Mother with breast cancer at 60 years old  Maternal grandfather with colon cancer  Heavy smoker of 2 packs/day for 39 years, quit April 2024  Denies any alcohol or drug use     ROS:   Constitutional: denies any weight changes, fatigue or weakness.  HEENT: Denies hearing loss and  "rhinorrhea  Cardiovascular: denies chest pain, palpitations, edemas.  Respiratory: denies cough, sputum, SOB.  Gastrointestinal: denies N&V, abd pain, diarrhea, constipation.  Genitourinary: denies dysuria, frequency.  Endocrine: denies cold intolerance, lethargy and flushing.  Hem: denies excessive bruising and postop bleeding.  Musculoskeletal: denies weakness, joint swelling, pain or stiffness.  Neuro: denies seizures, CVA, paresthesia, or peripheral neuropathy.   Skin: denies change in nevi, rashes, masses.     PE:   Vitals:    06/17/25 1408   BP: 132/80   BP Location: Left arm   Patient Position: Sitting   Cuff Size: Adult   Pulse: 77   SpO2: 98%   Weight: 70.9 kg (156 lb 6.4 oz)   Height: 167.6 cm (66\")     Body mass index is 25.24 kg/m².   Alert and oriented ×3, no acute distress.  Head is normocephalic and atraumatic.  Neck is supple  Abdomen is soft and tender to palpation over the epigastric area and right upper quadrant.  No rebound or guarding no peritoneal sign.  There is no tenderness of the flank or the back  No clubbing cyanosis or edema in lower or upper extremities     Diagnostic studies:   Imaging reviewed by me  CT scan abdomen pelvis 5/7/2025: No acute intra-abdominal abnormality identified in the abdomen and pelvis.  Hepatic steatosis.  Colonic diverticulosis without diverticulitis.  Evidence of prior cholecystectomy    CT scan of the chest 5/7/2025: No acute intrathoracic findings    Labs 5/12/2025: Normal CBC  Normal BMP  Urine with trace blood     Assessment and plan    The patient is a very pleasant 59-year-old female with right upper quadrant epigastric abdominal pain, bloating, symptoms consistent with IBS versus peptic ulcer disease.  No major risk factor for gastric ulcers.  Discussed with the patient about further step.  I recommend that she undergoes upper endoscopy for evaluation to rule out H. pylori, celiac disease.  Risk and benefits of procedure including bleeding, infection or " perforation discussed in detail with the patient that verbalized understanding and agreed with the plan.  In the meantime I will start her on Protonix 40 mg daily as well as sucralfate 1 g 3 times a day.  I recommend that she continue taking Levsin for symptoms control.    She understood  Surgical scheduling started    Jus Salas MD  General, Minimally Invasive and Endoscopic Surgery  [facility]     [address], [address]  [place], [place], [address]  P: [phone]  F: [phone]

## 2025-06-18 NOTE — H&P (VIEW-ONLY)
CC: Abdominal pain     HPI: Patient is a very pleasant 59-year-old female that was referred by Dr. Bell for evaluation of right upper quadrant abdominal pain.  The patient reports that for the past year she has been feeling right upper quadrant abdominal pain that can happen anytime of the day and most of the time is present.  He has been associated with abdominal bloating, burping, swelling of the right side of the abdominal wall.  There is radiation to the right flank and back.  There has not been associated any nausea or vomiting.  She denies any fevers or chills.  Patient has history of cholecystectomy.  She denies any heartburn or regurgitation.  Denies any recent change in bowel habits.     PMH: Colon polyps, acid reflux, anxiety, arthritis, depression, gout, hypertension, hypothyroidism, chronic kidney disease stage II, postoperative nausea and vomiting, renal artery stenosis, stress incontinence     PSH:   - Right knee arthroscopy  - Appendectomy 1983  - Bilateral laparoscopic tubal ligation 1984  - Laparoscopic cholecystectomy 2012                                                                                                                                                                                   -Colonoscopy and upper endoscopy 2013  -Diagnosed laparoscopy 2016  - Diagnostic laparoscopy 2018  - Transvaginal bladder suspension 2024  - Bilateral breast reduction 2024  - Colonoscopy 3/2025    ALL: Amoxicillin causes itching  Hydrocodone causes GI intolerance  Ibuprofen causes nausea and vomiting  Percocet causes itching and rash  Tylenol with codeine causes nausea and vomiting  Statin cause myalgia    FH and SH:   Mother with breast cancer at 60 years old  Maternal grandfather with colon cancer  Heavy smoker of 2 packs/day for 39 years, quit April 2024  Denies any alcohol or drug use     ROS:   Constitutional: denies any weight changes, fatigue or weakness.  HEENT: Denies hearing loss and  "rhinorrhea  Cardiovascular: denies chest pain, palpitations, edemas.  Respiratory: denies cough, sputum, SOB.  Gastrointestinal: denies N&V, abd pain, diarrhea, constipation.  Genitourinary: denies dysuria, frequency.  Endocrine: denies cold intolerance, lethargy and flushing.  Hem: denies excessive bruising and postop bleeding.  Musculoskeletal: denies weakness, joint swelling, pain or stiffness.  Neuro: denies seizures, CVA, paresthesia, or peripheral neuropathy.   Skin: denies change in nevi, rashes, masses.     PE:   Vitals:    06/17/25 1408   BP: 132/80   BP Location: Left arm   Patient Position: Sitting   Cuff Size: Adult   Pulse: 77   SpO2: 98%   Weight: 70.9 kg (156 lb 6.4 oz)   Height: 167.6 cm (66\")     Body mass index is 25.24 kg/m².   Alert and oriented ×3, no acute distress.  Head is normocephalic and atraumatic.  Neck is supple  Abdomen is soft and tender to palpation over the epigastric area and right upper quadrant.  No rebound or guarding no peritoneal sign.  There is no tenderness of the flank or the back  No clubbing cyanosis or edema in lower or upper extremities     Diagnostic studies:   Imaging reviewed by me  CT scan abdomen pelvis 5/7/2025: No acute intra-abdominal abnormality identified in the abdomen and pelvis.  Hepatic steatosis.  Colonic diverticulosis without diverticulitis.  Evidence of prior cholecystectomy    CT scan of the chest 5/7/2025: No acute intrathoracic findings    Labs 5/12/2025: Normal CBC  Normal BMP  Urine with trace blood     Assessment and plan    The patient is a very pleasant 59-year-old female with right upper quadrant epigastric abdominal pain, bloating, symptoms consistent with IBS versus peptic ulcer disease.  No major risk factor for gastric ulcers.  Discussed with the patient about further step.  I recommend that she undergoes upper endoscopy for evaluation to rule out H. pylori, celiac disease.  Risk and benefits of procedure including bleeding, infection or " perforation discussed in detail with the patient that verbalized understanding and agreed with the plan.  In the meantime I will start her on Protonix 40 mg daily as well as sucralfate 1 g 3 times a day.  I recommend that she continue taking Levsin for symptoms control.    She understood  Surgical scheduling started    Jus Salas MD  General, Minimally Invasive and Endoscopic Surgery  [facility]     [address], [address]  [place], [place], [address]  P: [phone]  F: [phone]

## 2025-06-23 ENCOUNTER — APPOINTMENT (OUTPATIENT)
Dept: WOMENS IMAGING | Facility: HOSPITAL | Age: 60
End: 2025-06-23
Payer: OTHER GOVERNMENT

## 2025-06-23 PROCEDURE — 76642 ULTRASOUND BREAST LIMITED: CPT | Performed by: RADIOLOGY

## 2025-06-23 PROCEDURE — G0279 TOMOSYNTHESIS, MAMMO: HCPCS | Performed by: RADIOLOGY

## 2025-06-23 PROCEDURE — 77066 DX MAMMO INCL CAD BI: CPT | Performed by: RADIOLOGY

## 2025-06-23 PROCEDURE — 77062 BREAST TOMOSYNTHESIS BI: CPT | Performed by: RADIOLOGY

## 2025-06-30 ENCOUNTER — TELEPHONE (OUTPATIENT)
Dept: SURGERY | Facility: CLINIC | Age: 60
End: 2025-06-30
Payer: OTHER GOVERNMENT

## 2025-06-30 NOTE — TELEPHONE ENCOUNTER
Voicemail left for patient to return call due to needing to reschedule her EGD from 7/3 to 7/2 per Dr. Salas. Advised patient that the EGD would be scheduled at 12:30 arrive at 11:30. Requested patient return call to confirm or to reschedule to a different date.     **NOTE: Hospital has a hold on time for endo starting @ 12:30 pm

## 2025-07-02 ENCOUNTER — ANESTHESIA (OUTPATIENT)
Dept: GASTROENTEROLOGY | Facility: HOSPITAL | Age: 60
End: 2025-07-02
Payer: OTHER GOVERNMENT

## 2025-07-02 ENCOUNTER — ANESTHESIA EVENT (OUTPATIENT)
Dept: GASTROENTEROLOGY | Facility: HOSPITAL | Age: 60
End: 2025-07-02
Payer: OTHER GOVERNMENT

## 2025-07-02 ENCOUNTER — HOSPITAL ENCOUNTER (OUTPATIENT)
Facility: HOSPITAL | Age: 60
Setting detail: HOSPITAL OUTPATIENT SURGERY
Discharge: HOME | End: 2025-07-02
Attending: SURGERY | Admitting: SURGERY
Payer: OTHER GOVERNMENT

## 2025-07-02 VITALS
HEART RATE: 75 BPM | TEMPERATURE: 97.9 F | SYSTOLIC BLOOD PRESSURE: 158 MMHG | HEIGHT: 66 IN | OXYGEN SATURATION: 96 % | RESPIRATION RATE: 14 BRPM | BODY MASS INDEX: 24.86 KG/M2 | WEIGHT: 154.7 LBS | DIASTOLIC BLOOD PRESSURE: 90 MMHG

## 2025-07-02 DIAGNOSIS — R10.11 RIGHT UPPER QUADRANT ABDOMINAL PAIN: ICD-10-CM

## 2025-07-02 PROCEDURE — 25010000002 LIDOCAINE 2% SOLUTION: Performed by: NURSE ANESTHETIST, CERTIFIED REGISTERED

## 2025-07-02 PROCEDURE — 25810000003 LACTATED RINGERS PER 1000 ML: Performed by: NURSE ANESTHETIST, CERTIFIED REGISTERED

## 2025-07-02 PROCEDURE — 25810000003 LACTATED RINGERS PER 1000 ML: Performed by: SURGERY

## 2025-07-02 PROCEDURE — 25010000002 PROPOFOL 200 MG/20ML EMULSION: Performed by: NURSE ANESTHETIST, CERTIFIED REGISTERED

## 2025-07-02 PROCEDURE — 88305 TISSUE EXAM BY PATHOLOGIST: CPT | Performed by: SURGERY

## 2025-07-02 PROCEDURE — 88312 SPECIAL STAINS GROUP 1: CPT | Performed by: SURGERY

## 2025-07-02 RX ORDER — PROPOFOL 10 MG/ML
INJECTION, EMULSION INTRAVENOUS AS NEEDED
Status: DISCONTINUED | OUTPATIENT
Start: 2025-07-02 | End: 2025-07-02 | Stop reason: SURG

## 2025-07-02 RX ORDER — SODIUM CHLORIDE, SODIUM LACTATE, POTASSIUM CHLORIDE, CALCIUM CHLORIDE 600; 310; 30; 20 MG/100ML; MG/100ML; MG/100ML; MG/100ML
INJECTION, SOLUTION INTRAVENOUS CONTINUOUS PRN
Status: DISCONTINUED | OUTPATIENT
Start: 2025-07-02 | End: 2025-07-02 | Stop reason: SURG

## 2025-07-02 RX ORDER — SODIUM CHLORIDE 0.9 % (FLUSH) 0.9 %
10 SYRINGE (ML) INJECTION AS NEEDED
Status: DISCONTINUED | OUTPATIENT
Start: 2025-07-02 | End: 2025-07-02 | Stop reason: HOSPADM

## 2025-07-02 RX ORDER — SODIUM CHLORIDE, SODIUM LACTATE, POTASSIUM CHLORIDE, CALCIUM CHLORIDE 600; 310; 30; 20 MG/100ML; MG/100ML; MG/100ML; MG/100ML
20 INJECTION, SOLUTION INTRAVENOUS ONCE
Status: COMPLETED | OUTPATIENT
Start: 2025-07-02 | End: 2025-07-02

## 2025-07-02 RX ORDER — LIDOCAINE HYDROCHLORIDE 20 MG/ML
INJECTION, SOLUTION INFILTRATION; PERINEURAL AS NEEDED
Status: DISCONTINUED | OUTPATIENT
Start: 2025-07-02 | End: 2025-07-02 | Stop reason: SURG

## 2025-07-02 RX ORDER — LIDOCAINE HYDROCHLORIDE 10 MG/ML
0.5 INJECTION, SOLUTION INFILTRATION; PERINEURAL ONCE AS NEEDED
Status: DISCONTINUED | OUTPATIENT
Start: 2025-07-02 | End: 2025-07-02 | Stop reason: HOSPADM

## 2025-07-02 RX ADMIN — SODIUM CHLORIDE, POTASSIUM CHLORIDE, SODIUM LACTATE AND CALCIUM CHLORIDE 20 ML/HR: 600; 310; 30; 20 INJECTION, SOLUTION INTRAVENOUS at 12:09

## 2025-07-02 RX ADMIN — LIDOCAINE HYDROCHLORIDE 60 MG: 20 INJECTION, SOLUTION INFILTRATION; PERINEURAL at 12:57

## 2025-07-02 RX ADMIN — PROPOFOL 200 MCG/KG/MIN: 10 INJECTION, EMULSION INTRAVENOUS at 12:58

## 2025-07-02 RX ADMIN — SODIUM CHLORIDE, POTASSIUM CHLORIDE, SODIUM LACTATE AND CALCIUM CHLORIDE: 600; 310; 30; 20 INJECTION, SOLUTION INTRAVENOUS at 12:35

## 2025-07-02 RX ADMIN — PROPOFOL 100 MG: 10 INJECTION, EMULSION INTRAVENOUS at 12:57

## 2025-07-02 NOTE — DISCHARGE INSTRUCTIONS
For the next 24 hours patient needs to be with a responsible adult.    For 24 hours DO NOT drive, operate machinery, appliances, drink alcohol, make important decisions or sign legal documents.    Start with a light or bland diet if you are feeling sick to your stomach otherwise advance to regular diet as tolerated.    Follow recommendations on procedure report if provided by your doctor.    Call Dr Salas for problems [phone]. Await pathology result in 7-10 days.    Problems may include but not limited to: large amounts of bleeding, trouble breathing, repeated vomiting, severe unrelieved pain, fever or chills.

## 2025-07-02 NOTE — ANESTHESIA PREPROCEDURE EVALUATION
Anesthesia Evaluation     Patient summary reviewed and Nursing notes reviewed   history of anesthetic complications:  PONV  NPO Solid Status: > 8 hours  NPO Liquid Status: > 8 hours           Airway   Mallampati: II  Neck ROM: full  No difficulty expected  Dental    (+) lower dentures and upper dentures    Comment: Secured by implants    Pulmonary     breath sounds clear to auscultation  (+) a smoker Former,shortness of breath  Cardiovascular     Rhythm: regular    (+) hypertension, CAD, PVD      Neuro/Psych  (+) psychiatric history  GI/Hepatic/Renal/Endo    (+) GERD, renal disease-, thyroid problem thyroid nodules    Musculoskeletal     Abdominal    Substance History      OB/GYN          Other   arthritis,                   Anesthesia Plan    ASA 3     MAC     intravenous induction     Anesthetic plan, risks, benefits, and alternatives have been provided, discussed and informed consent has been obtained with: patient.    CODE STATUS:

## 2025-07-02 NOTE — ANESTHESIA POSTPROCEDURE EVALUATION
"Patient: Waleska Shah    Procedure Summary       Date: 07/02/25 Room / Location:  [facility]    Anesthesia Start: 1235 Anesthesia Stop: 1315    Procedure: ESOPHAGOGASTRODUODENOSCOPY with biopsies (Esophagus) Diagnosis:       Right upper quadrant abdominal pain      (Right upper quadrant abdominal pain [R10.11])    Surgeons: Jus Salas MD Provider: Royer Camacho MD    Anesthesia Type: MAC ASA Status: 3            Anesthesia Type: MAC    Vitals  Vitals Value Taken Time   /86 07/02/25 13:24   Temp     Pulse 74 07/02/25 13:25   Resp 15 07/02/25 13:14   SpO2 94 % 07/02/25 13:25   Vitals shown include unfiled device data.        Post Anesthesia Care and Evaluation    Patient location during evaluation: bedside  Patient participation: complete - patient participated  Level of consciousness: sleepy but conscious  Pain score: 0  Pain management: adequate    Airway patency: patent  Anesthetic complications: No anesthetic complications    Cardiovascular status: acceptable  Respiratory status: acceptable  Hydration status: acceptable    Comments: /92 (BP Location: Left arm, Patient Position: Lying)   Pulse 84   Temp 36.6 °C (97.9 °F) (Oral)   Resp 15   Ht 167.6 cm (66\")   Wt 70.2 kg (154 lb 11.2 oz)   LMP 04/08/2018   SpO2 92%   BMI 24.97 kg/m²       "

## 2025-07-07 LAB
CYTO UR: NORMAL
LAB AP CASE REPORT: NORMAL
PATH REPORT.ADDENDUM SPEC: NORMAL
PATH REPORT.FINAL DX SPEC: NORMAL
PATH REPORT.GROSS SPEC: NORMAL

## 2025-07-16 ENCOUNTER — TELEPHONE (OUTPATIENT)
Dept: SURGERY | Facility: CLINIC | Age: 60
End: 2025-07-16
Payer: OTHER GOVERNMENT

## 2025-07-16 RX ORDER — PANTOPRAZOLE SODIUM 40 MG/1
40 TABLET, DELAYED RELEASE ORAL DAILY
Qty: 30 TABLET | Refills: 1 | Status: SHIPPED | OUTPATIENT
Start: 2025-07-16 | End: 2026-07-16

## 2025-07-16 NOTE — TELEPHONE ENCOUNTER
I will call the patient regarding her upper endoscopy results.    Final Diagnosis   1.  Small bowel, second portion duodenum, biopsy: Benign small bowel with-               -A. Normal intact villous architecture.               -B. No significant inflammation, no granuloma.               -C. No viral inclusions or other organisms on routinely stained sections.      2.  Small bowel, duodenal bulb, biopsy: Benign small bowel mucosa with               - A.  Foveolar metaplasia               - B.  Herniation of Brunner's glands in the lamina propria               - C.  Mild blunting of the villi     Comment: The features are those of a nonspecific/peptic duodenitis     3.  Stomach, pylorus, biopsy: Benign gastric mucosa with               -A. Mild chronic inflammation (mild non-specific chronic gastritis).                -B. No intestinal metaplasia.               -C. No Helicobacter pylori.      4.  Stomach, body, biopsy: Benign gastric mucosa with               -A. Mild chronic inflammation (mild non-specific chronic inactive gastritis).                -B. No intestinal metaplasia.               -C. No Helicobacter pylori.     5.  Esophagus, Z-line biopsy: Benign squamous and gastric mucosa with-               -A. Chronic inflammation in the gastric mucosa.               -B. No intestinal metaplasia.               -C. No Helicobacter pylori.                - D.  Esophagitis with focal erosion of the squamous mucosa        Patient to complete her course of sucralfate.  Patient to continue Protonix 40 mg for 30 more days.    She still continues to have bloating.  I recommend that she takes simethicone as needed for bloating.  Patient will follow-up in my office in 2 months

## 2025-07-24 ENCOUNTER — TELEPHONE (OUTPATIENT)
Dept: FAMILY MEDICINE CLINIC | Facility: CLINIC | Age: 60
End: 2025-07-24
Payer: OTHER GOVERNMENT

## 2025-07-24 NOTE — TELEPHONE ENCOUNTER
Patient called for same day apt.  Office did not have any openings. Patient stated that she had burning and itching with urination.  Patient also has a rash on her neck.  Advised patient to go to Urgent Care.

## 2025-08-13 RX ORDER — SUCRALFATE 1 G/1
1 TABLET ORAL 3 TIMES DAILY
Qty: 90 TABLET | Refills: 1 | Status: SHIPPED | OUTPATIENT
Start: 2025-08-13

## 2025-08-14 ENCOUNTER — OFFICE VISIT (OUTPATIENT)
Dept: FAMILY MEDICINE CLINIC | Facility: CLINIC | Age: 60
End: 2025-08-14
Payer: OTHER GOVERNMENT

## 2025-08-14 VITALS
HEART RATE: 88 BPM | TEMPERATURE: 97.8 F | WEIGHT: 147 LBS | DIASTOLIC BLOOD PRESSURE: 78 MMHG | SYSTOLIC BLOOD PRESSURE: 132 MMHG | OXYGEN SATURATION: 97 % | HEIGHT: 66 IN | BODY MASS INDEX: 23.63 KG/M2

## 2025-08-14 DIAGNOSIS — E55.9 VITAMIN D DEFICIENCY: ICD-10-CM

## 2025-08-14 DIAGNOSIS — R14.0 BLOATING: ICD-10-CM

## 2025-08-14 DIAGNOSIS — E53.8 VITAMIN B12 DEFICIENCY: ICD-10-CM

## 2025-08-14 DIAGNOSIS — R39.9 URINARY TRACT INFECTION SYMPTOMS: ICD-10-CM

## 2025-08-14 DIAGNOSIS — R10.32 LEFT LOWER QUADRANT ABDOMINAL PAIN: Primary | ICD-10-CM

## 2025-08-14 DIAGNOSIS — E07.9 THYROID DISORDER: ICD-10-CM

## 2025-08-14 LAB
BILIRUB BLD-MCNC: ABNORMAL MG/DL
CLARITY, POC: ABNORMAL
COLOR UR: ABNORMAL
GLUCOSE UR STRIP-MCNC: NEGATIVE MG/DL
KETONES UR QL: ABNORMAL
LEUKOCYTE EST, POC: NEGATIVE
NITRITE UR-MCNC: NEGATIVE MG/ML
PH UR: 6.5 [PH] (ref 5–8)
PROT UR STRIP-MCNC: ABNORMAL MG/DL
RBC # UR STRIP: ABNORMAL /UL
SP GR UR: 1.02 (ref 1–1.03)
UROBILINOGEN UR QL: NORMAL

## 2025-08-14 PROCEDURE — 81002 URINALYSIS NONAUTO W/O SCOPE: CPT | Performed by: NURSE PRACTITIONER

## 2025-08-14 PROCEDURE — 99214 OFFICE O/P EST MOD 30 MIN: CPT | Performed by: NURSE PRACTITIONER

## 2025-08-14 PROCEDURE — 87086 URINE CULTURE/COLONY COUNT: CPT | Performed by: NURSE PRACTITIONER

## 2025-08-14 RX ORDER — NITROFURANTOIN 25; 75 MG/1; MG/1
100 CAPSULE ORAL 2 TIMES DAILY
Qty: 8 CAPSULE | Refills: 1 | Status: SHIPPED | OUTPATIENT
Start: 2025-08-14

## 2025-08-14 RX ORDER — RESORCINOL/BALSAM/BISMUTH/ZINC
1 SUPPOSITORY, RECTAL RECTAL
Qty: 60 TABLET | Refills: 3 | Status: SHIPPED | OUTPATIENT
Start: 2025-08-14

## 2025-08-15 LAB — BACTERIA SPEC AEROBE CULT: NORMAL

## 2025-08-19 ENCOUNTER — CLINICAL SUPPORT (OUTPATIENT)
Dept: FAMILY MEDICINE CLINIC | Facility: CLINIC | Age: 60
End: 2025-08-19
Payer: OTHER GOVERNMENT

## 2025-08-19 DIAGNOSIS — E55.9 VITAMIN D DEFICIENCY: ICD-10-CM

## 2025-08-19 DIAGNOSIS — E53.8 VITAMIN B12 DEFICIENCY: ICD-10-CM

## 2025-08-19 DIAGNOSIS — R10.32 LEFT LOWER QUADRANT ABDOMINAL PAIN: ICD-10-CM

## 2025-08-19 DIAGNOSIS — E78.5 DYSLIPIDEMIA: Primary | ICD-10-CM

## 2025-08-19 DIAGNOSIS — E07.9 THYROID DISORDER: ICD-10-CM

## 2025-08-19 LAB
ALBUMIN SERPL-MCNC: 4.7 G/DL (ref 3.5–5.2)
ALBUMIN/GLOB SERPL: 1.5 G/DL
ALP SERPL-CCNC: 100 U/L (ref 39–117)
ALT SERPL W P-5'-P-CCNC: 16 U/L (ref 1–33)
ANION GAP SERPL CALCULATED.3IONS-SCNC: 12.7 MMOL/L (ref 5–15)
AST SERPL-CCNC: 25 U/L (ref 1–32)
BASOPHILS # BLD AUTO: 0.06 10*3/MM3 (ref 0–0.2)
BASOPHILS NFR BLD AUTO: 0.8 % (ref 0–1.5)
BILIRUB BLD-MCNC: NEGATIVE MG/DL
BILIRUB SERPL-MCNC: 0.4 MG/DL (ref 0–1.2)
BUN SERPL-MCNC: 18 MG/DL (ref 6–20)
BUN/CREAT SERPL: 17.8 (ref 7–25)
CALCIUM SPEC-SCNC: 10.2 MG/DL (ref 8.6–10.5)
CHLORIDE SERPL-SCNC: 106 MMOL/L (ref 98–107)
CHOLEST SERPL-MCNC: 244 MG/DL (ref 0–200)
CLARITY, POC: CLEAR
CO2 SERPL-SCNC: 21.3 MMOL/L (ref 22–29)
COLOR UR: YELLOW
CREAT SERPL-MCNC: 1.01 MG/DL (ref 0.57–1)
DEPRECATED RDW RBC AUTO: 40.2 FL (ref 37–54)
EGFRCR SERPLBLD CKD-EPI 2021: 64.7 ML/MIN/1.73
EOSINOPHIL # BLD AUTO: 0.08 10*3/MM3 (ref 0–0.4)
EOSINOPHIL NFR BLD AUTO: 1.1 % (ref 0.3–6.2)
ERYTHROCYTE [DISTWIDTH] IN BLOOD BY AUTOMATED COUNT: 12.5 % (ref 12.3–15.4)
EXPIRATION DATE: ABNORMAL
GLOBULIN UR ELPH-MCNC: 3.2 GM/DL
GLUCOSE SERPL-MCNC: 97 MG/DL (ref 65–99)
GLUCOSE UR STRIP-MCNC: NEGATIVE MG/DL
HCT VFR BLD AUTO: 40.9 % (ref 34–46.6)
HDLC SERPL-MCNC: 42 MG/DL (ref 40–60)
HGB BLD-MCNC: 13.9 G/DL (ref 12–15.9)
IMM GRANULOCYTES # BLD AUTO: 0.02 10*3/MM3 (ref 0–0.05)
IMM GRANULOCYTES NFR BLD AUTO: 0.3 % (ref 0–0.5)
KETONES UR QL: NEGATIVE
LDLC SERPL CALC-MCNC: 159 MG/DL (ref 0–100)
LDLC/HDLC SERPL: 3.7 {RATIO}
LEUKOCYTE EST, POC: ABNORMAL
LYMPHOCYTES # BLD AUTO: 2.64 10*3/MM3 (ref 0.7–3.1)
LYMPHOCYTES NFR BLD AUTO: 35.4 % (ref 19.6–45.3)
Lab: ABNORMAL
MCH RBC QN AUTO: 30.4 PG (ref 26.6–33)
MCHC RBC AUTO-ENTMCNC: 34 G/DL (ref 31.5–35.7)
MCV RBC AUTO: 89.5 FL (ref 79–97)
MONOCYTES # BLD AUTO: 0.42 10*3/MM3 (ref 0.1–0.9)
MONOCYTES NFR BLD AUTO: 5.6 % (ref 5–12)
NEUTROPHILS NFR BLD AUTO: 4.24 10*3/MM3 (ref 1.7–7)
NEUTROPHILS NFR BLD AUTO: 56.8 % (ref 42.7–76)
NITRITE UR-MCNC: NEGATIVE MG/ML
NRBC BLD AUTO-RTO: 0 /100 WBC (ref 0–0.2)
PH UR: 6 [PH] (ref 5–8)
PLATELET # BLD AUTO: 346 10*3/MM3 (ref 140–450)
PMV BLD AUTO: 10.7 FL (ref 6–12)
POTASSIUM SERPL-SCNC: 4.7 MMOL/L (ref 3.5–5.2)
PROT SERPL-MCNC: 7.9 G/DL (ref 6–8.5)
PROT UR STRIP-MCNC: ABNORMAL MG/DL
RBC # BLD AUTO: 4.57 10*6/MM3 (ref 3.77–5.28)
RBC # UR STRIP: ABNORMAL /UL
SODIUM SERPL-SCNC: 140 MMOL/L (ref 136–145)
SP GR UR: 1.02 (ref 1–1.03)
TRIGL SERPL-MCNC: 234 MG/DL (ref 0–150)
UROBILINOGEN UR QL: ABNORMAL
VLDLC SERPL-MCNC: 43 MG/DL (ref 5–40)
WBC NRBC COR # BLD AUTO: 7.46 10*3/MM3 (ref 3.4–10.8)

## 2025-08-19 PROCEDURE — 36415 COLL VENOUS BLD VENIPUNCTURE: CPT | Performed by: NURSE PRACTITIONER

## 2025-08-19 PROCEDURE — 82306 VITAMIN D 25 HYDROXY: CPT | Performed by: NURSE PRACTITIONER

## 2025-08-19 PROCEDURE — 84479 ASSAY OF THYROID (T3 OR T4): CPT | Performed by: NURSE PRACTITIONER

## 2025-08-19 PROCEDURE — 81003 URINALYSIS AUTO W/O SCOPE: CPT | Performed by: NURSE PRACTITIONER

## 2025-08-19 PROCEDURE — 84443 ASSAY THYROID STIM HORMONE: CPT | Performed by: NURSE PRACTITIONER

## 2025-08-19 PROCEDURE — 82746 ASSAY OF FOLIC ACID SERUM: CPT | Performed by: NURSE PRACTITIONER

## 2025-08-19 PROCEDURE — 82607 VITAMIN B-12: CPT | Performed by: NURSE PRACTITIONER

## 2025-08-19 PROCEDURE — 80061 LIPID PANEL: CPT | Performed by: INTERNAL MEDICINE

## 2025-08-19 PROCEDURE — 84436 ASSAY OF TOTAL THYROXINE: CPT | Performed by: NURSE PRACTITIONER

## 2025-08-19 PROCEDURE — 87086 URINE CULTURE/COLONY COUNT: CPT | Performed by: NURSE PRACTITIONER

## 2025-08-19 PROCEDURE — 80053 COMPREHEN METABOLIC PANEL: CPT | Performed by: NURSE PRACTITIONER

## 2025-08-19 PROCEDURE — 85025 COMPLETE CBC W/AUTO DIFF WBC: CPT | Performed by: NURSE PRACTITIONER

## 2025-08-20 LAB
25(OH)D3 SERPL-MCNC: 55.7 NG/ML (ref 30–100)
BACTERIA SPEC AEROBE CULT: NORMAL
FOLATE SERPL-MCNC: 7.78 NG/ML (ref 4.78–24.2)
T-UPTAKE NFR SERPL: 1.11 TBI (ref 0.8–1.3)
T4 SERPL-MCNC: 8.52 MCG/DL (ref 4.5–11.7)
TSH SERPL DL<=0.05 MIU/L-ACNC: 0.7 UIU/ML (ref 0.27–4.2)
VIT B12 BLD-MCNC: 369 PG/ML (ref 211–946)

## 2025-08-21 DIAGNOSIS — M25.569 CHRONIC KNEE PAIN, UNSPECIFIED LATERALITY: Primary | ICD-10-CM

## 2025-08-21 DIAGNOSIS — G89.29 CHRONIC KNEE PAIN, UNSPECIFIED LATERALITY: Primary | ICD-10-CM

## 2025-08-27 DIAGNOSIS — M25.562 CHRONIC PAIN OF BOTH KNEES: Primary | ICD-10-CM

## 2025-08-27 DIAGNOSIS — G89.29 CHRONIC PAIN OF BOTH KNEES: Primary | ICD-10-CM

## 2025-08-27 DIAGNOSIS — M25.561 CHRONIC PAIN OF BOTH KNEES: Primary | ICD-10-CM

## (undated) DEVICE — SYR LL TP 10ML STRL

## (undated) DEVICE — LEGGINGS, PAIR, CLEAR, STERILE: Brand: MEDLINE

## (undated) DEVICE — GLV SURG BIOGEL LTX PF 6 1/2

## (undated) DEVICE — HDRST POSITIONING FM RND 2X9IN

## (undated) DEVICE — TOTAL TRAY, 16FR 10ML SIL FOLEY, URN: Brand: MEDLINE

## (undated) DEVICE — ADHS SKIN DERMABOND TOP ADVANCED

## (undated) DEVICE — HYPODERMIC SAFETY NEEDLE: Brand: MONOJECT

## (undated) DEVICE — SWABSTK SKINPREP PVPI LF PK/3

## (undated) DEVICE — BLCK/BITE BLOX W/DENTL/RIM W/STRAP 54F

## (undated) DEVICE — GLV SURG TRIUMPH CLASSIC PF LTX 7 STRL

## (undated) DEVICE — STPLR SKIN VISISTAT WD 35CT

## (undated) DEVICE — ANTIBACTERIAL UNDYED BRAIDED (POLYGLACTIN 910), SYNTHETIC ABSORBABLE SUTURE: Brand: COATED VICRYL

## (undated) DEVICE — TRAP FLD MINIVAC MEGADYNE 100ML

## (undated) DEVICE — INTENDED FOR TISSUE SEPARATION, AND OTHER PROCEDURES THAT REQUIRE A SHARP SURGICAL BLADE TO PUNCTURE OR CUT.: Brand: BARD-PARKER ® CARBON RIB-BACK BLADES

## (undated) DEVICE — ELECTRD NDL EZ CLN MOD 2.75IN

## (undated) DEVICE — PATIENT RETURN ELECTRODE, SINGLE-USE, CONTACT QUALITY MONITORING, ADULT, WITH 9FT CORD, FOR PATIENTS WEIGING OVER 33LBS. (15KG): Brand: MEGADYNE

## (undated) DEVICE — PAD SANI MAXI W/ADHS SNG WRP 11IN

## (undated) DEVICE — SKIN PREP TRAY W/CHG: Brand: MEDLINE INDUSTRIES, INC.

## (undated) DEVICE — CATHETER,FOLEY,100%SILICONE,16FR,10ML,LF: Brand: MEDLINE

## (undated) DEVICE — RADIFOCUS OPTITORQUE ANGIOGRAPHIC CATHETER: Brand: OPTITORQUE

## (undated) DEVICE — MSK PROC CURAPLEX O2 2/ADAPT 7FT

## (undated) DEVICE — LOU D & C HYSTEROSCOPY: Brand: MEDLINE INDUSTRIES, INC.

## (undated) DEVICE — STRIP CLS WND SUTURESTRIP/PLS 0.5X5IN TP1105

## (undated) DEVICE — PK UNIV COMPL 40

## (undated) DEVICE — GW WWSS35145 WHOLEY WIRE V04: Brand: WHOLEY™

## (undated) DEVICE — TUBING, SUCTION, 1/4" X 10', STRAIGHT: Brand: MEDLINE

## (undated) DEVICE — COVER,MAYO STAND,STERILE: Brand: MEDLINE

## (undated) DEVICE — BANDAGE,GAUZE,BULKEE II,4.5"X4.1YD,STRL: Brand: MEDLINE

## (undated) DEVICE — GLV SURG BIOGEL LTX PF 7 1/2

## (undated) DEVICE — GLIDESHEATH SLENDER STAINLESS STEEL KIT: Brand: GLIDESHEATH SLENDER

## (undated) DEVICE — NDL HYPO PRECISIONGLIDE REG 25G 1 1/2

## (undated) DEVICE — SUT GUT PLN 5/0 P3 18IN 686G

## (undated) DEVICE — CATH LAB PACK: Brand: MEDLINE INDUSTRIES, INC.

## (undated) DEVICE — PK ENT 40

## (undated) DEVICE — SPONGE,LAP,18"X18",DLX,XR,ST,5/PK,40/PK: Brand: MEDLINE

## (undated) DEVICE — SUT MNCRYL PLS ANTIB UD 4/0 PS2 18IN

## (undated) DEVICE — MEDI-VAC YANKAUER SUCTION HANDLE W/BULBOUS TIP: Brand: CARDINAL HEALTH

## (undated) DEVICE — TBG PENCL TELESCP MEGADYNE SMOKE EVAC 10FT

## (undated) DEVICE — MARKR SKIN W/RULR AND LBL

## (undated) DEVICE — STRIP,CLOSURE,WOUND,MEDI-STRIP,1/2X4: Brand: MEDLINE

## (undated) DEVICE — CROUCH CORNEAL PROTECTOR: Brand: BAUSCH + LOMB

## (undated) DEVICE — NEEDLE, QUINCKE 22GX3.5": Brand: MEDLINE INDUSTRIES, INC.

## (undated) DEVICE — ADAPT CLN BIOGUARD AIR/H2O DISP

## (undated) DEVICE — SOL NACL 0.9PCT 1000ML

## (undated) DEVICE — SUT PDS 2 0 CT1 27IN CLR Z259H

## (undated) DEVICE — ADHS LIQ MASTISOL 2/3ML

## (undated) DEVICE — STERILE COTTON TIP 6IN 10PK: Brand: MEDLINE

## (undated) DEVICE — LOU GYN LAPAROSCOPY: Brand: MEDLINE INDUSTRIES, INC.

## (undated) DEVICE — TROCAR: Brand: KII SLEEVE

## (undated) DEVICE — 3M™ STERI-STRIP™ COMPOUND BENZOIN TINCTURE 40 BAGS/CARTON 4 CARTONS/CASE C1544: Brand: 3M™ STERI-STRIP™

## (undated) DEVICE — SUT MNCRYL 5/0 P3 18 IN Y493G

## (undated) DEVICE — APPL HEMOS FOR DELIVERY FLOSEAL

## (undated) DEVICE — NDL HYPO PRECISIONGLIDE/REG 30G 1/2 BRN

## (undated) DEVICE — GLV SURG BIOGEL SENSR LTX PF SZ7.5

## (undated) DEVICE — DRAPE,REIN 53X77,STERILE: Brand: MEDLINE

## (undated) DEVICE — MARKER,SKIN,WI/RULER AND LABELS: Brand: MEDLINE

## (undated) DEVICE — DRSNG TELFA PAD NONADH STR 1S 3X4IN

## (undated) DEVICE — 3M™ STERI-STRIP™ REINFORCED ADHESIVE SKIN CLOSURES, R1546, 1/4 IN X 4 IN (6 MM X 100 MM), 10 STRIPS/ENVELOPE: Brand: 3M™ STERI-STRIP™

## (undated) DEVICE — GW FC FLOP/TP .035 260CM 3MM

## (undated) DEVICE — KT ORCA ORCAPOD DISP STRL

## (undated) DEVICE — HARMONIC ACE +7 LAPAROSCOPIC SHEARS ADVANCED HEMOSTASIS 5MM DIAMETER 36CM SHAFT LENGTH  FOR USE WITH GRAY HAND PIECE ONLY: Brand: HARMONIC ACE

## (undated) DEVICE — SUT GUT PLN FAST ABS 5/0 PC1 18IN 1915G

## (undated) DEVICE — TROCAR: Brand: KII OPTICAL ACCESS SYSTEM

## (undated) DEVICE — SYR CONTRL LUERLOK 10CC

## (undated) DEVICE — SUT SILK 4/0 TIES 18IN A183H

## (undated) DEVICE — GOWN,NON-REINFORCED,SIRUS,SET IN SLV,XL: Brand: MEDLINE

## (undated) DEVICE — BNDG,ELSTC,MATRIX,STRL,6"X5YD,LF,HOOK&LP: Brand: MEDLINE

## (undated) DEVICE — CATH 4F INF PIG 145Â° 110 CM: Brand: INFINITI

## (undated) DEVICE — ST IRR CYSTO W/SPK 77IN LF

## (undated) DEVICE — SUT VIC 5/0 P3 18IN J493G

## (undated) DEVICE — WIPE INST MEROCEL

## (undated) DEVICE — MARKR SKIN W/RULR ULTRAFINETP

## (undated) DEVICE — SENSR O2 OXIMAX FNGR A/ 18IN NONSTR

## (undated) DEVICE — 2, DISPOSABLE SUCTION/IRRIGATOR WITH DISPOSABLE TIP: Brand: STRYKEFLOW

## (undated) DEVICE — LN SMPL CO2 SHTRM SD STREAM W/M LUER

## (undated) DEVICE — SUT VIC 3/0 PS2 27IN J427H

## (undated) DEVICE — SUT MNCRYL 2/0 SH 27IN Y317H